# Patient Record
Sex: FEMALE | Race: WHITE | Employment: FULL TIME | URBAN - METROPOLITAN AREA
[De-identification: names, ages, dates, MRNs, and addresses within clinical notes are randomized per-mention and may not be internally consistent; named-entity substitution may affect disease eponyms.]

---

## 2020-01-24 ENCOUNTER — OFFICE VISIT (OUTPATIENT)
Dept: PRIMARY CARE CLINIC | Age: 55
End: 2020-01-24

## 2020-01-24 VITALS
OXYGEN SATURATION: 99 % | HEART RATE: 65 BPM | DIASTOLIC BLOOD PRESSURE: 63 MMHG | TEMPERATURE: 97 F | HEIGHT: 70 IN | RESPIRATION RATE: 16 BRPM | BODY MASS INDEX: 19.33 KG/M2 | SYSTOLIC BLOOD PRESSURE: 130 MMHG | WEIGHT: 135 LBS

## 2020-01-24 PROCEDURE — 99203 OFFICE O/P NEW LOW 30 MIN: CPT | Performed by: EMERGENCY MEDICINE

## 2020-01-24 ASSESSMENT — ENCOUNTER SYMPTOMS
COUGH: 0
WHEEZING: 0
EYE DISCHARGE: 0
ABDOMINAL PAIN: 0
TROUBLE SWALLOWING: 0
RHINORRHEA: 0
DIARRHEA: 0
SHORTNESS OF BREATH: 0
VOMITING: 0
SORE THROAT: 1
EYE PAIN: 0
NAUSEA: 0
SINUS PAIN: 0
SINUS PRESSURE: 1
EYE REDNESS: 1

## 2020-01-24 NOTE — PROGRESS NOTES
Normocephalic and atraumatic. Right Ear: Ear canal and external ear normal. A middle ear effusion is present. Tympanic membrane is erythematous, retracted and bulging. Left Ear: Ear canal and external ear normal. A middle ear effusion is present. Tympanic membrane is erythematous, retracted and bulging. Nose: Mucosal edema present. Right Sinus: No maxillary sinus tenderness or frontal sinus tenderness. Left Sinus: No maxillary sinus tenderness or frontal sinus tenderness. Mouth/Throat:      Pharynx: Uvula midline. No posterior oropharyngeal erythema. Tonsils: No tonsillar exudate or tonsillar abscesses. Eyes:      General: No scleral icterus. Conjunctiva/sclera: Conjunctivae normal.      Pupils: Pupils are equal, round, and reactive to light. Neck:      Musculoskeletal: Normal range of motion and neck supple. No neck rigidity. Thyroid: No thyromegaly. Trachea: No tracheal deviation. Cardiovascular:      Rate and Rhythm: Normal rate and regular rhythm. Heart sounds: Normal heart sounds. No murmur. No friction rub. No gallop. Pulmonary:      Effort: Pulmonary effort is normal. No accessory muscle usage or respiratory distress. Breath sounds: Normal breath sounds. No decreased breath sounds, wheezing, rhonchi or rales. Chest:      Chest wall: No tenderness. Lymphadenopathy:      Cervical: No cervical adenopathy. Skin:     General: Skin is warm and dry. Findings: Erythema (maculopapular rash posterior aspect of both elbows, L>R ) present. Neurological:      General: No focal deficit present. Mental Status: She is alert and oriented to person, place, and time. Psychiatric:         Mood and Affect: Mood normal.         Behavior: Behavior normal.             Assessment:    1.  Acute URI        We discussed the appropriate indications for use of antibiotics and the Patient has expressed understanding that antibiotics are not believed to before you increase the amount of fluids you drink. · Take an over-the-counter pain medicine, such as acetaminophen (Tylenol), ibuprofen (Advil, Motrin), or naproxen (Aleve). Read and follow all instructions on the label. · Before you use cough and cold medicines, check the label. These medicines may not be safe for young children or for people with certain health problems. · Be careful when taking over-the-counter cold or flu medicines and Tylenol at the same time. Many of these medicines have acetaminophen, which is Tylenol. Read the labels to make sure that you are not taking more than the recommended dose. Too much acetaminophen (Tylenol) can be harmful. · Get plenty of rest.  · Do not smoke or allow others to smoke around you. If you need help quitting, talk to your doctor about stop-smoking programs and medicines. These can increase your chances of quitting for good. When should you call for help? Call 911 anytime you think you may need emergency care. For example, call if:    · You have severe trouble breathing.    Call your doctor now or seek immediate medical care if:    · You seem to be getting much sicker.     · You have new or worse trouble breathing.     · You have a new or higher fever.     · You have a new rash.    Watch closely for changes in your health, and be sure to contact your doctor if:    · You have a new symptom, such as a sore throat, an earache, or sinus pain.     · You cough more deeply or more often, especially if you notice more mucus or a change in the color of your mucus.     · You do not get better as expected. Where can you learn more? Go to [email or web address]://uBanksusan.[email or web address] org and sign in to your [email or web address] account. Enter Q687 in the Unocoin box to learn more about \"Upper Respiratory Infection (Cold): Care Instructions. \"     If you do not have an account, please click on the \"Sign Up Now\" link.   Current as of: June 9, 2019  Content Version: 12.3  ©

## 2020-01-24 NOTE — PATIENT INSTRUCTIONS
Mucinex-D OR Mucinex + Afrin nasal spray    Afrin nasal spray twice daily for nasal congestion for no more than 4 consecutive days    Throat sprays, lozenges, salt-water gargles for comfort  Tylenol or Ibuprofen for pain/fever (may alternate up to every 3 hours as needed)  Plenty of fluids    Follow-up with your primary care physician in 1 week if not improving. If you do not have a primary care physician, call [phone] for a referral or visit [email or web address]        Patient Education        Upper Respiratory Infection (Cold): Care Instructions  Your Care Instructions    An upper respiratory infection, or URI, is an infection of the nose, sinuses, or throat. URIs are spread by coughs, sneezes, and direct contact. The common cold is the most frequent kind of URI. The flu and sinus infections are other kinds of URIs. Almost all URIs are caused by viruses. Antibiotics won't cure them. But you can treat most infections with home care. This may include drinking lots of fluids and taking over-the-counter pain medicine. You will probably feel better in 4 to 10 days. The doctor has checked you carefully, but problems can develop later. If you notice any problems or new symptoms, get medical treatment right away. Follow-up care is a key part of your treatment and safety. Be sure to make and go to all appointments, and call your doctor if you are having problems. It's also a good idea to know your test results and keep a list of the medicines you take. How can you care for yourself at home? · To prevent dehydration, drink plenty of fluids, enough so that your urine is light yellow or clear like water. Choose water and other caffeine-free clear liquids until you feel better. If you have kidney, heart, or liver disease and have to limit fluids, talk with your doctor before you increase the amount of fluids you drink.   · Take an over-the-counter pain medicine, such as acetaminophen (Tylenol), ibuprofen (Advil, Motrin), or naproxen (Aleve). Read and follow all instructions on the label. · Before you use cough and cold medicines, check the label. These medicines may not be safe for young children or for people with certain health problems. · Be careful when taking over-the-counter cold or flu medicines and Tylenol at the same time. Many of these medicines have acetaminophen, which is Tylenol. Read the labels to make sure that you are not taking more than the recommended dose. Too much acetaminophen (Tylenol) can be harmful. · Get plenty of rest.  · Do not smoke or allow others to smoke around you. If you need help quitting, talk to your doctor about stop-smoking programs and medicines. These can increase your chances of quitting for good. When should you call for help? Call 911 anytime you think you may need emergency care. For example, call if:    · You have severe trouble breathing.    Call your doctor now or seek immediate medical care if:    · You seem to be getting much sicker.     · You have new or worse trouble breathing.     · You have a new or higher fever.     · You have a new rash.    Watch closely for changes in your health, and be sure to contact your doctor if:    · You have a new symptom, such as a sore throat, an earache, or sinus pain.     · You cough more deeply or more often, especially if you notice more mucus or a change in the color of your mucus.     · You do not get better as expected. Where can you learn more? Go to [email or web address] and sign in to your [employer] account. Enter B389 in the InVisM box to learn more about \"Upper Respiratory Infection (Cold): Care Instructions. \"     If you do not have an account, please click on the \"Sign Up Now\" link. Current as of: June 9, 2019  Content Version: 12.3  © 1617-2763 [employer]wise[employer]. Care instructions adapted under license by [facility] [place] ([facility]).  If you have questions about a medical condition or

## 2020-06-26 ENCOUNTER — VIRTUAL VISIT (OUTPATIENT)
Dept: FAMILY MEDICINE CLINIC | Age: 55
End: 2020-06-26
Payer: COMMERCIAL

## 2020-06-26 PROCEDURE — 99396 PREV VISIT EST AGE 40-64: CPT | Performed by: FAMILY MEDICINE

## 2020-06-26 RX ORDER — ESCITALOPRAM OXALATE 10 MG/1
10 TABLET ORAL DAILY
Qty: 90 TABLET | Refills: 2 | Status: SHIPPED | OUTPATIENT
Start: 2020-06-26 | End: 2020-09-29 | Stop reason: SDUPTHER

## 2020-06-26 RX ORDER — ESTRADIOL AND NORETHINDRONE ACETATE 1; .5 MG/1; MG/1
TABLET, FILM COATED ORAL
COMMUNITY
Start: 2020-03-21 | End: 2020-06-26 | Stop reason: SDUPTHER

## 2020-06-26 RX ORDER — ESTRADIOL 1.53 MG/1
SPRAY TRANSDERMAL
COMMUNITY
Start: 2019-10-18 | End: 2020-06-26 | Stop reason: CLARIF

## 2020-06-26 RX ORDER — ESTRADIOL AND NORETHINDRONE ACETATE 1; .5 MG/1; MG/1
TABLET, FILM COATED ORAL
Qty: 90 TABLET | Refills: 2 | Status: SHIPPED | OUTPATIENT
Start: 2020-06-26 | End: 2020-09-29 | Stop reason: SDUPTHER

## 2020-06-26 RX ORDER — BUTALBITAL, ACETAMINOPHEN AND CAFFEINE 50; 325; 40 MG/1; MG/1; MG/1
1 TABLET ORAL EVERY 4 HOURS PRN
Qty: 30 TABLET | Refills: 1 | Status: SHIPPED | OUTPATIENT
Start: 2020-06-26 | End: 2020-09-29 | Stop reason: SDUPTHER

## 2020-06-26 RX ORDER — ESTRADIOL 1.53 MG/1
SPRAY TRANSDERMAL
Qty: 1 BOTTLE | Refills: 2 | Status: CANCELLED | OUTPATIENT
Start: 2020-06-26

## 2020-06-26 RX ORDER — IPRATROPIUM BROMIDE 42 UG/1
2 SPRAY, METERED NASAL 2 TIMES DAILY
Qty: 3 BOTTLE | Refills: 3 | Status: SHIPPED | OUTPATIENT
Start: 2020-06-26 | End: 2020-09-29 | Stop reason: SDUPTHER

## 2020-06-26 ASSESSMENT — PATIENT HEALTH QUESTIONNAIRE - PHQ9
SUM OF ALL RESPONSES TO PHQ QUESTIONS 1-9: 0
SUM OF ALL RESPONSES TO PHQ QUESTIONS 1-9: 0
SUM OF ALL RESPONSES TO PHQ9 QUESTIONS 1 & 2: 0
2. FEELING DOWN, DEPRESSED OR HOPELESS: 0
1. LITTLE INTEREST OR PLEASURE IN DOING THINGS: 0

## 2020-08-06 ENCOUNTER — NURSE ONLY (OUTPATIENT)
Dept: PRIMARY CARE CLINIC | Age: 55
End: 2020-08-06
Payer: COMMERCIAL

## 2020-08-06 PROCEDURE — 99211 OFF/OP EST MAY X REQ PHY/QHP: CPT | Performed by: NURSE PRACTITIONER

## 2020-08-10 ENCOUNTER — ANESTHESIA EVENT (OUTPATIENT)
Dept: ENDOSCOPY | Age: 55
End: 2020-08-10
Payer: COMMERCIAL

## 2020-08-11 ENCOUNTER — HOSPITAL ENCOUNTER (OUTPATIENT)
Age: 55
Setting detail: OUTPATIENT SURGERY
Discharge: HOME | End: 2020-08-11
Attending: INTERNAL MEDICINE | Admitting: INTERNAL MEDICINE
Payer: COMMERCIAL

## 2020-08-11 ENCOUNTER — ANESTHESIA (OUTPATIENT)
Dept: ENDOSCOPY | Age: 55
End: 2020-08-11
Payer: COMMERCIAL

## 2020-08-11 VITALS
BODY MASS INDEX: 18.61 KG/M2 | DIASTOLIC BLOOD PRESSURE: 93 MMHG | OXYGEN SATURATION: 100 % | RESPIRATION RATE: 16 BRPM | SYSTOLIC BLOOD PRESSURE: 133 MMHG | HEART RATE: 56 BPM | HEIGHT: 70 IN | WEIGHT: 130 LBS | TEMPERATURE: 96.9 F

## 2020-08-11 VITALS
RESPIRATION RATE: 12 BRPM | OXYGEN SATURATION: 99 % | DIASTOLIC BLOOD PRESSURE: 74 MMHG | SYSTOLIC BLOOD PRESSURE: 111 MMHG

## 2020-08-11 PROCEDURE — 2580000003 HC RX 258: Performed by: ANESTHESIOLOGY

## 2020-08-11 PROCEDURE — 2709999900 HC NON-CHARGEABLE SUPPLY: Performed by: INTERNAL MEDICINE

## 2020-08-11 PROCEDURE — 7100000010 HC PHASE II RECOVERY - FIRST 15 MIN: Performed by: INTERNAL MEDICINE

## 2020-08-11 PROCEDURE — 6360000002 HC RX W HCPCS: Performed by: NURSE ANESTHETIST, CERTIFIED REGISTERED

## 2020-08-11 PROCEDURE — 3609010600 HC COLONOSCOPY POLYPECTOMY SNARE/COLD BIOPSY: Performed by: INTERNAL MEDICINE

## 2020-08-11 PROCEDURE — 7100000011 HC PHASE II RECOVERY - ADDTL 15 MIN: Performed by: INTERNAL MEDICINE

## 2020-08-11 PROCEDURE — 3700000000 HC ANESTHESIA ATTENDED CARE: Performed by: INTERNAL MEDICINE

## 2020-08-11 PROCEDURE — 3700000001 HC ADD 15 MINUTES (ANESTHESIA): Performed by: INTERNAL MEDICINE

## 2020-08-11 PROCEDURE — 88305 TISSUE EXAM BY PATHOLOGIST: CPT

## 2020-08-11 RX ORDER — HYDRALAZINE HYDROCHLORIDE 20 MG/ML
5 INJECTION INTRAMUSCULAR; INTRAVENOUS EVERY 10 MIN PRN
Status: DISCONTINUED | OUTPATIENT
Start: 2020-08-11 | End: 2020-08-11 | Stop reason: HOSPADM

## 2020-08-11 RX ORDER — DIPHENHYDRAMINE HYDROCHLORIDE 50 MG/ML
12.5 INJECTION INTRAMUSCULAR; INTRAVENOUS
Status: DISCONTINUED | OUTPATIENT
Start: 2020-08-11 | End: 2020-08-11 | Stop reason: HOSPADM

## 2020-08-11 RX ORDER — SODIUM CHLORIDE, SODIUM LACTATE, POTASSIUM CHLORIDE, CALCIUM CHLORIDE 600; 310; 30; 20 MG/100ML; MG/100ML; MG/100ML; MG/100ML
INJECTION, SOLUTION INTRAVENOUS CONTINUOUS
Status: DISCONTINUED | OUTPATIENT
Start: 2020-08-11 | End: 2020-08-11 | Stop reason: HOSPADM

## 2020-08-11 RX ORDER — SODIUM CHLORIDE 9 MG/ML
INJECTION, SOLUTION INTRAVENOUS CONTINUOUS
Status: DISCONTINUED | OUTPATIENT
Start: 2020-08-11 | End: 2020-08-11 | Stop reason: HOSPADM

## 2020-08-11 RX ORDER — METOCLOPRAMIDE HYDROCHLORIDE 5 MG/ML
10 INJECTION INTRAMUSCULAR; INTRAVENOUS
Status: DISCONTINUED | OUTPATIENT
Start: 2020-08-11 | End: 2020-08-11 | Stop reason: HOSPADM

## 2020-08-11 RX ORDER — PROMETHAZINE HYDROCHLORIDE 25 MG/ML
6.25 INJECTION, SOLUTION INTRAMUSCULAR; INTRAVENOUS
Status: DISCONTINUED | OUTPATIENT
Start: 2020-08-11 | End: 2020-08-11 | Stop reason: HOSPADM

## 2020-08-11 RX ORDER — LIDOCAINE HYDROCHLORIDE 20 MG/ML
INJECTION, SOLUTION INTRAVENOUS PRN
Status: DISCONTINUED | OUTPATIENT
Start: 2020-08-11 | End: 2020-08-11 | Stop reason: SDUPTHER

## 2020-08-11 RX ORDER — LABETALOL 20 MG/4 ML (5 MG/ML) INTRAVENOUS SYRINGE
5 EVERY 10 MIN PRN
Status: DISCONTINUED | OUTPATIENT
Start: 2020-08-11 | End: 2020-08-11 | Stop reason: HOSPADM

## 2020-08-11 RX ORDER — OXYCODONE HYDROCHLORIDE 5 MG/1
5 TABLET ORAL PRN
Status: DISCONTINUED | OUTPATIENT
Start: 2020-08-11 | End: 2020-08-11 | Stop reason: HOSPADM

## 2020-08-11 RX ORDER — OXYCODONE HYDROCHLORIDE 5 MG/1
10 TABLET ORAL PRN
Status: DISCONTINUED | OUTPATIENT
Start: 2020-08-11 | End: 2020-08-11 | Stop reason: HOSPADM

## 2020-08-11 RX ORDER — PROPOFOL 10 MG/ML
INJECTION, EMULSION INTRAVENOUS PRN
Status: DISCONTINUED | OUTPATIENT
Start: 2020-08-11 | End: 2020-08-11 | Stop reason: SDUPTHER

## 2020-08-11 RX ORDER — MORPHINE SULFATE 4 MG/ML
1 INJECTION, SOLUTION INTRAMUSCULAR; INTRAVENOUS EVERY 5 MIN PRN
Status: DISCONTINUED | OUTPATIENT
Start: 2020-08-11 | End: 2020-08-11 | Stop reason: HOSPADM

## 2020-08-11 RX ORDER — SODIUM CHLORIDE 0.9 % (FLUSH) 0.9 %
10 SYRINGE (ML) INJECTION PRN
Status: DISCONTINUED | OUTPATIENT
Start: 2020-08-11 | End: 2020-08-11 | Stop reason: HOSPADM

## 2020-08-11 RX ORDER — MEPERIDINE HYDROCHLORIDE 25 MG/ML
12.5 INJECTION INTRAMUSCULAR; INTRAVENOUS; SUBCUTANEOUS EVERY 5 MIN PRN
Status: DISCONTINUED | OUTPATIENT
Start: 2020-08-11 | End: 2020-08-11 | Stop reason: HOSPADM

## 2020-08-11 RX ORDER — SODIUM CHLORIDE 0.9 % (FLUSH) 0.9 %
10 SYRINGE (ML) INJECTION EVERY 12 HOURS SCHEDULED
Status: DISCONTINUED | OUTPATIENT
Start: 2020-08-11 | End: 2020-08-11 | Stop reason: HOSPADM

## 2020-08-11 RX ADMIN — PROPOFOL 25 MG: 10 INJECTION, EMULSION INTRAVENOUS at 11:07

## 2020-08-11 RX ADMIN — SODIUM CHLORIDE, POTASSIUM CHLORIDE, SODIUM LACTATE AND CALCIUM CHLORIDE: 600; 310; 30; 20 INJECTION, SOLUTION INTRAVENOUS at 10:31

## 2020-08-11 RX ADMIN — PROPOFOL 125 MG: 10 INJECTION, EMULSION INTRAVENOUS at 11:00

## 2020-08-11 RX ADMIN — PROPOFOL 25 MG: 10 INJECTION, EMULSION INTRAVENOUS at 11:15

## 2020-08-11 RX ADMIN — PROPOFOL 35 MG: 10 INJECTION, EMULSION INTRAVENOUS at 11:05

## 2020-08-11 RX ADMIN — PROPOFOL 40 MG: 10 INJECTION, EMULSION INTRAVENOUS at 11:02

## 2020-08-11 RX ADMIN — PROPOFOL 25 MG: 10 INJECTION, EMULSION INTRAVENOUS at 11:19

## 2020-08-11 RX ADMIN — LIDOCAINE HYDROCHLORIDE 15 MG: 20 INJECTION, SOLUTION INTRAVENOUS at 11:09

## 2020-08-11 RX ADMIN — LIDOCAINE HYDROCHLORIDE 50 MG: 20 INJECTION, SOLUTION INTRAVENOUS at 11:00

## 2020-08-11 RX ADMIN — PROPOFOL 30 MG: 10 INJECTION, EMULSION INTRAVENOUS at 11:12

## 2020-08-11 RX ADMIN — PROPOFOL 25 MG: 10 INJECTION, EMULSION INTRAVENOUS at 11:09

## 2020-08-11 RX ADMIN — PROPOFOL 25 MG: 10 INJECTION, EMULSION INTRAVENOUS at 11:21

## 2020-08-11 RX ADMIN — LIDOCAINE HYDROCHLORIDE 15 MG: 20 INJECTION, SOLUTION INTRAVENOUS at 11:05

## 2020-08-11 RX ADMIN — LIDOCAINE HYDROCHLORIDE 20 MG: 20 INJECTION, SOLUTION INTRAVENOUS at 11:02

## 2020-08-11 ASSESSMENT — PULMONARY FUNCTION TESTS
PIF_VALUE: 0

## 2020-08-11 ASSESSMENT — PAIN SCALES - GENERAL
PAINLEVEL_OUTOF10: 0

## 2020-08-11 ASSESSMENT — PAIN - FUNCTIONAL ASSESSMENT: PAIN_FUNCTIONAL_ASSESSMENT: 0-10

## 2020-08-11 NOTE — ANESTHESIA POSTPROCEDURE EVALUATION
Department of Anesthesiology  Postprocedure Note    Patient: Shanel Erickson  MRN: [id number]  YOB: 1965  Date of evaluation: 8/11/2020  Time:  11:47 AM     Procedure Summary     Date:  08/11/20 Room / Location:  [facility]    Anesthesia Start:  1054 Anesthesia Stop:  1134    Procedure:  COLONOSCOPY POLYPECTOMY SNARE/COLD BIOPSY (N/A ) Diagnosis:       Hemorrhoids without complication      (Hemorrhoids without complication [F09.5])    Surgeon:  Gilberto Barrera MD Responsible Provider:  Davon Denney MD    Anesthesia Type:  general ASA Status:  2          Anesthesia Type: general    Mary Ellen Phase I: Mary Ellen Score: 10    Mary Ellen Phase II: Mary Ellen Score: 9    Last vitals: Reviewed and per EMR flowsheets.        Anesthesia Post Evaluation    Patient location during evaluation: PACU  Patient participation: complete - patient participated  Level of consciousness: awake and alert  Pain score: 0  Airway patency: patent  Nausea & Vomiting: no nausea and no vomiting  Complications: no  Cardiovascular status: hemodynamically stable  Respiratory status: acceptable  Hydration status: euvolemic

## 2020-08-11 NOTE — PROGRESS NOTES
Ambulatory Surgery/Procedure Discharge Note    Vitals:    08/11/20 1158   BP: (!) 133/93   Pulse: 56   Resp: 16   Temp:    SpO2: 100%       In: 600 [I.V.:600]  Out: -     Restroom use offered before discharge. Yes, pt declined     Pain assessment:  level of pain (1-10, 10 severe)  Pain Level: 0    1140: Pt to Endoscopy recovery post colonoscopy. Pt denies pain at this time. Pt denies nausea at this time. Abdomen soft. Pt resting comfortably. Dr. Lyn has already spoken to pt prior to arrival to Phase II.   1150: Pt tolerating PO fluids well. 1200: Pt denies pain, pt denies nausea at this time. Awaiting . 1240: Pt's  has arrived, discharge instructions given and  states understanding of these instructions. Patient discharged to home/self care.  Patient discharged via wheel chair by transporter to waiting family/S.O.       8/11/2020 12:08 PM

## 2020-08-11 NOTE — H&P
[facility] [place]   Pre-operative History and Physical    Patient: John Ramsey  : 1965     History Obtained From:  patient, electronic medical record    HISTORY OF PRESENT ILLNESS:    The patient is a 63 y.o. female who presents for a colonoscopy for GI bleed/screening. Past Medical History:        Diagnosis Date    Anxiety     Basal cell carcinoma     chest , stomach    DDD (degenerative disc disease), cervical     L4-5; L5-S1    History of melanoma     right thigh     Lumbar foraminal stenosis     Migraine      Past Surgical History:        Procedure Laterality Date    BACK SURGERY  2000    X 4    ENDOMETRIAL ABLATION  2013    FOOT SURGERY Left      Medications Prior to Admission:   No current facility-administered medications on file prior to encounter. Current Outpatient Medications on File Prior to Encounter   Medication Sig Dispense Refill    MIMVEY 1-0.5 MG per tablet TAKE 1 TABLET BY MOUTH EVERY DAY 90 tablet 2    escitalopram (LEXAPRO) 10 MG tablet Take 1 tablet by mouth daily 90 tablet 2    butalbital-acetaminophen-caffeine (FIORICET) -40 MG per tablet Take 1 tablet by mouth every 4 hours as needed for Headaches 30 tablet 1    ipratropium (ATROVENT) 0.06 % nasal spray 2 sprays by Nasal route 2 times daily 3 Bottle 3     Allergies:  Iodine    History of allergic reaction to anesthesia:  No    Social History:   TOBACCO:   reports that she has never smoked. She has never used smokeless tobacco.  ETOH:   reports current alcohol use. DRUGS:   reports no history of drug use.   Family History:       Problem Relation Age of Onset    Arthritis Mother         back    Other Mother         balance    Diabetes type 2  Father 72    Coronary Art Dis Father 72    Hypertension Father     Arthritis Father     Colon Cancer Maternal Grandmother     Cancer Brother         melanoma    Breast Cancer Maternal Aunt     Breast Cancer Maternal Cousin        PHYSICAL EXAM:      BP (!) 140/93   Pulse 66   Temp 97.5 °F (36.4 °C) (Temporal)   Resp 16   Ht 5' 10\" (1.778 m)   Wt 130 lb (59 kg)   SpO2 100%   BMI 18.65 kg/m²  I        Heart:  No m/r/g +s1/s2 RRR    Lungs:  CTA bilaterally    Abdomen:  Soft, nontender, non distended; +bs    ASA Grade:  ASA 2 - Patient with mild systemic disease with no functional limitations    Mallampati Class:  Class I: Soft palate, uvula, fauces, pillars visible  __________  Class II: Soft palate, uvula, fauces visible  ____x______   Class III: Soft palate, base of uvula visible  __________  Class IV: Hard palate only visible   __________      ASSESSMENT AND PLAN:    1. Patient is a 63 y.o. female here for colonoscopy with deep sedation  2. Procedure options, risks and benefits reviewed with patient. We specifically discussed that risks include, but are not limited to infection, bleeding, perforation, death, and missed lesions. Patient expresses understanding.

## 2020-08-11 NOTE — ANESTHESIA PRE PROCEDURE
Department of Anesthesiology  Preprocedure Note       Name:  Brie Chaudhry   Age:  63 y.o.  :  1965                                          MRN:  [id number]         Date:  2020      Surgeon: Zehra Finley):  Osiel Munguia MD    Procedure: Procedure(s):  COLONOSCOPY    Medications prior to admission:   Prior to Admission medications    Medication Sig Start Date End Date Taking? Authorizing Provider   MIMVEY 1-0.5 MG per tablet TAKE 1 TABLET BY MOUTH EVERY DAY 20  Yes Jose Quinn MD   escitalopram (LEXAPRO) 10 MG tablet Take 1 tablet by mouth daily 20  Yes Jose Quinn MD   butalbital-acetaminophen-caffeine (FIORICET) -54 MG per tablet Take 1 tablet by mouth every 4 hours as needed for Headaches 20  Yes Jose Quinn MD   ipratropium (ATROVENT) 0.06 % nasal spray 2 sprays by Nasal route 2 times daily 20 Yes Jose Quinn MD       Current medications:    Current Facility-Administered Medications   Medication Dose Route Frequency Provider Last Rate Last Dose    sodium chloride flush 0.9 % injection 10 mL  10 mL Intravenous 2 times per day Haroldine Feller, DO        sodium chloride flush 0.9 % injection 10 mL  10 mL Intravenous PRN Haroldine Feller, DO        0.9 % sodium chloride infusion   Intravenous Continuous Haroldine Feller, DO        lactated ringers infusion   Intravenous Continuous Haroldine Feller,  mL/hr at 20 1031         Allergies:     Allergies   Allergen Reactions    Iodine        Problem List:    Patient Active Problem List   Diagnosis Code    Anxiety F41.9    Lumbar foraminal stenosis M48.061    DDD (degenerative disc disease), cervical M50.30    History of melanoma Z85.820    Basal cell carcinoma C44.91       Past Medical History:        Diagnosis Date    Anxiety     Basal cell carcinoma     chest , stomach    DDD (degenerative disc disease), cervical     L4-5; L5-S1    History of melanoma     right thigh     Lumbar foraminal stenosis     Migraine        Past Surgical History:        Procedure Laterality Date    BACK SURGERY  2000    X 4    ENDOMETRIAL ABLATION  2013    FOOT SURGERY Left        Social History:    Social History     Tobacco Use    Smoking status: Never Smoker    Smokeless tobacco: Never Used   Substance Use Topics    Alcohol use: Yes     Comment: socially                                Counseling given: Not Answered      Vital Signs (Current):   Vitals:    08/11/20 0953   BP: (!) 140/93   Pulse: 66   Resp: 16   Temp: 97.5 °F (36.4 °C)   TempSrc: Temporal   SpO2: 100%   Weight: 130 lb (59 kg)   Height: 5' 10\" (1.778 m)                                              BP Readings from Last 3 Encounters:   08/11/20 (!) 140/93   01/24/20 130/63       NPO Status: Time of last liquid consumption: 0600                        Time of last solid consumption: 1900                        Date of last liquid consumption: 08/11/20                        Date of last solid food consumption: 08/09/20    BMI:   Wt Readings from Last 3 Encounters:   08/11/20 130 lb (59 kg)   01/24/20 135 lb (61.2 kg)     Body mass index is 18.65 kg/m². CBC: No results found for: WBC, RBC, HGB, HCT, MCV, RDW, PLT    CMP: No results found for: NA, K, CL, CO2, BUN, CREATININE, GFRAA, AGRATIO, LABGLOM, GLUCOSE, PROT, CALCIUM, BILITOT, ALKPHOS, AST, ALT    POC Tests: No results for input(s): POCGLU, POCNA, POCK, POCCL, POCBUN, POCHEMO, POCHCT in the last 72 hours.     Coags: No results found for: PROTIME, INR, APTT    HCG (If Applicable): No results found for: PREGTESTUR, PREGSERUM, HCG, HCGQUANT     ABGs: No results found for: PHART, PO2ART, HDZ9NNE, FHO8EFQ, BEART, Q5QVPWAJ     Type & Screen (If Applicable):  No results found for: LABABO, LABRH    Drug/Infectious Status (If Applicable):  No results found for: HIV, HEPCAB    COVID-19 Screening (If Applicable):   Lab Results   Component Value Date    COVID19 Not Detected 08/06/2020         Anesthesia

## 2020-08-11 NOTE — PROCEDURES
[facility]  Colonoscopy REPORT    Patient: Rico Stern                  1965    Referring Physician:  Jennifer Spring: Barbara Berrios     Indication:  GI bleed; screening     Medications:  MAC      Pre-Anesthesia Assessment:  I have reviewed and am in agreement with patient history and medication, including previous response to sedation. Prior to the procedure, a History and Physical was performed, and patient medications and allergies were reviewed. The risks and benefits of the procedure and the sedation options and risks were discussed with the patient. Complications included but were not limited to infection, bleeding, perforation, death, and missed lesions. All questions were answered and informed consent was obtained by the patient. Patient identification and proposed procedure were verified by the physician and the nurse in the pre-procedure area and in the procedure room. Mallampatti: II  ASA Grade Assessment: 2    After reviewing the risks and benefits, the patient was deemed in satisfactory condition to undergo the procedure. The anesthesia plan was to use MAC sedation. Immediately prior to administration of medications, the patient was re-assessed for adequacy to receive sedatives and a time out was performed. Patient and healthcare providers were in agreement it was the correct patient and procedure. The heart rate, respiratory rate, oxygen saturations, blood pressure, adequacy of pulmonary ventilation, and response to care were monitored throughout the procedure. The physical status of the patient was re-assessed after the procedure. After adequate sedation was achieved in stepwise fashion a rectal examination was performed and showed hemorrhoids. The pediatric colonoscope was then advanced atraumatically into the rectum and advanced without difficulty to the cecum.   The cecum was identified by the appendiceal orifice the ileocecal valve and other normal anatomy and a picture was obtained. The scope was then withdrawn (>6minutes) with close inspection of the mucosa in a circumferential manor. TI normal for 5cms. Retroflexed views under the ICV and of the right colon normal.     5mm cecal polyp removed with cold snare. 6mm transverse polyp removed with cold snare. Retroflexed views of the rectum show RP hemorrhoid is gone with healed scar. LL > RA. Skin tag present as well. No immediate complications. The preparation was good. Estimated blood loss none    Impression:  Normal TI  2 polyps  Hemorrhoids. Plan:  The patient is aware it is their responsibility to call for biopsy results in 7 days. Repeat colonoscopy in 5 years.

## 2020-08-12 LAB — SARS-COV-2, NAA: NOT DETECTED

## 2020-08-17 ENCOUNTER — OFFICE VISIT (OUTPATIENT)
Dept: GYNECOLOGY | Age: 55
End: 2020-08-17
Payer: COMMERCIAL

## 2020-08-17 VITALS
WEIGHT: 133.8 LBS | TEMPERATURE: 96 F | SYSTOLIC BLOOD PRESSURE: 126 MMHG | RESPIRATION RATE: 17 BRPM | DIASTOLIC BLOOD PRESSURE: 87 MMHG | BODY MASS INDEX: 19.15 KG/M2 | HEART RATE: 73 BPM | HEIGHT: 70 IN

## 2020-08-17 PROCEDURE — 99386 PREV VISIT NEW AGE 40-64: CPT | Performed by: OBSTETRICS & GYNECOLOGY

## 2020-08-17 NOTE — PROGRESS NOTES
activity: Not Currently   Lifestyle    Physical activity     Days per week: Not on file     Minutes per session: Not on file    Stress: Not on file   Relationships    Social connections     Talks on phone: Not on file     Gets together: Not on file     Attends Restoration service: Not on file     Active member of club or organization: Not on file     Attends meetings of clubs or organizations: Not on file     Relationship status: Not on file    Intimate partner violence     Fear of current or ex partner: Not on file     Emotionally abused: Not on file     Physically abused: Not on file     Forced sexual activity: Not on file   Other Topics Concern    Not on file   Social History Narrative    Not on file     Allergies   Allergen Reactions    Iodine Hives     Outpatient Medications Marked as Taking for the 8/17/20 encounter (Office Visit) with Justice Avina MD   Medication Sig Dispense Refill    MIMVEY 1-0.5 MG per tablet TAKE 1 TABLET BY MOUTH EVERY DAY 90 tablet 2    escitalopram (LEXAPRO) 10 MG tablet Take 1 tablet by mouth daily 90 tablet 2    butalbital-acetaminophen-caffeine (FIORICET) -40 MG per tablet Take 1 tablet by mouth every 4 hours as needed for Headaches 30 tablet 1    ipratropium (ATROVENT) 0.06 % nasal spray 2 sprays by Nasal route 2 times daily 3 Bottle 3     Family History   Problem Relation Age of Onset    Arthritis Mother         back    Other Mother         balance    Diabetes type 2  Father 72    Coronary Art Dis Father 72    Hypertension Father     Arthritis Father     Colon Cancer Maternal Grandmother     Cancer Brother         melanoma    Breast Cancer Maternal Aunt     Breast Cancer Maternal Cousin      /87 (Site: Right Upper Arm, Position: Sitting, Cuff Size: Medium Adult)   Pulse 73   Temp 96 °F (35.6 °C)   Resp 17   Ht 5' 10\" (1.778 m)   Wt 133 lb 12.8 oz (60.7 kg)   BMI 19.20 kg/m²       Objective:   Physical Exam  Constitutional: Appearance: Normal appearance. She is well-developed and normal weight. HENT:      Head: Normocephalic. Nose: Nose normal.      Mouth/Throat:      Mouth: Mucous membranes are moist.      Pharynx: Oropharynx is clear. Eyes:      Pupils: Pupils are equal, round, and reactive to light. Neck:      Musculoskeletal: Normal range of motion and neck supple. No neck rigidity. Thyroid: No thyromegaly. Cardiovascular:      Rate and Rhythm: Normal rate and regular rhythm. Pulses: Normal pulses. Heart sounds: Normal heart sounds. No murmur. No friction rub. No gallop. Pulmonary:      Effort: Pulmonary effort is normal. No respiratory distress. Breath sounds: Normal breath sounds. No stridor. No wheezing, rhonchi or rales. Chest:      Chest wall: No tenderness. Breasts:         Right: Normal. No swelling, bleeding, inverted nipple, mass, nipple discharge, skin change or tenderness. Left: Normal. No swelling, bleeding, inverted nipple, mass, nipple discharge, skin change or tenderness. Abdominal:      General: Bowel sounds are normal. There is no distension. Palpations: Abdomen is soft. There is no mass. Tenderness: There is no abdominal tenderness. There is no guarding or rebound. Hernia: No hernia is present. There is no hernia in the left inguinal area. Genitourinary:     General: Normal vulva. Exam position: Lithotomy position. Pubic Area: No rash. Labia:         Right: No rash, tenderness, lesion or injury. Left: No rash, tenderness, lesion or injury. Urethra: No prolapse, urethral pain, urethral swelling or urethral lesion. Vagina: No signs of injury and foreign body. No vaginal discharge, erythema, tenderness, bleeding, lesions or prolapsed vaginal walls. Cervix: No cervical motion tenderness, discharge, friability, lesion, erythema, cervical bleeding or eversion.       Uterus: Not deviated, not enlarged, not fixed and not tender. Adnexa:         Right: No mass, tenderness or fullness. Left: No mass, tenderness or fullness. Rectum: External hemorrhoid present. No anal fissure. Comments: Internal rectal exam deferred due to hemorrhoids  Musculoskeletal: Normal range of motion. General: No tenderness. Lymphadenopathy:      Cervical: No cervical adenopathy. Lower Body: No right inguinal adenopathy. No left inguinal adenopathy. Skin:     General: Skin is warm and dry. Coloration: Skin is not pale. Findings: No erythema or rash. Neurological:      General: No focal deficit present. Mental Status: She is alert and oriented to person, place, and time. Mental status is at baseline. Deep Tendon Reflexes: Reflexes are normal and symmetric. Psychiatric:         Mood and Affect: Mood normal.         Behavior: Behavior normal.         Thought Content: Thought content normal.         Judgment: Judgment normal.         Assessment:      1. Annual  2. Menopause  3. Hemorrhoids  4. Hx melanoma      Plan:      1. Pap, calcium, exercise, mammogram, hemocult negative  2. Stable  3. Getting treatment for these-discussed Dr. Minal Horton if needs surgery  4. Referral to Derm.         aKrishma Ziegler MD

## 2020-08-18 ASSESSMENT — ENCOUNTER SYMPTOMS
RESPIRATORY NEGATIVE: 1
EYES NEGATIVE: 1
GASTROINTESTINAL NEGATIVE: 1
BACK PAIN: 1

## 2020-08-19 LAB
HPV COMMENT: NORMAL
HPV TYPE 16: NOT DETECTED
HPV TYPE 18: NOT DETECTED
HPVOH (OTHER TYPES): NOT DETECTED

## 2020-09-09 ENCOUNTER — OFFICE VISIT (OUTPATIENT)
Dept: SURGERY | Age: 55
End: 2020-09-09
Payer: COMMERCIAL

## 2020-09-09 VITALS
RESPIRATION RATE: 16 BRPM | DIASTOLIC BLOOD PRESSURE: 90 MMHG | BODY MASS INDEX: 19.61 KG/M2 | WEIGHT: 137 LBS | HEIGHT: 70 IN | HEART RATE: 69 BPM | SYSTOLIC BLOOD PRESSURE: 148 MMHG | TEMPERATURE: 97.1 F

## 2020-09-09 PROCEDURE — 99203 OFFICE O/P NEW LOW 30 MIN: CPT | Performed by: SURGERY

## 2020-09-09 PROCEDURE — 46600 DIAGNOSTIC ANOSCOPY SPX: CPT | Performed by: SURGERY

## 2020-09-09 NOTE — PATIENT INSTRUCTIONS
Hemorrhoids: Information and Care Instructions        Hemorrhoids are enlarged veins that develop in the anal canal. They can occur with constipation, diarrhea, straining and pushing during bowel movements, pregnancy, and smoking/coughing. The tissue can get irritated and inflamed, causing bleeding, itching, swelling, and pain. Hemorrhoids can be internal or external (or occasionally both). Sometimes internal hemorrhoids can prolapse, or come out of the anus, causing difficulty keeping clean, mucus drainage, bleeding, and discomfort. External hemorrhoids are more likely to clot and cause sudden severe pain on the outside of the anus. They can be uncomfortable at times, but hemorrhoids rarely are a life-threatening problem. However, not all bleeding and pain can be blamed on hemorrhoids until a thorough examination (and sometimes a colonoscopy) is performed. The initial treatment for both internal and external hemorrhoids is the same, as below:    STOOL REGIMEN for hemorrhoids:    Stools should be soft, formed, and easy to pass consistency, not diarrhea, without the need to strain. 1) Eat a healthy diet with lots of fruits and vegetables. Exercise and be active. 2) Use a fiber supplement twice daily (Metamucil, Benefiber, Konsyl, Citrucel, generic brand, etc) per package instructions. - Women should aim for 25 grams of fiber daily.    - Men should aim for 30-35 grams of fiber daily. 3) Drink 6-8 glasses of water per day. This will work with the fiber to regulate your stools. 4) MiraLax is safe to use every day, and can be used to help lubricate and soften stool. 5) Colace stool softeners can also be used as needed. Avoid Senna and sennakot laxative products, as they may damage the colon with long-term use. Warm water sitz baths (sit in a tub filled with 3-4 inches of warm water) can be done 1-2 times daily for 5 min to help with hygiene and relaxation.      DO NOT STRAIN ON THE TOILET. DO NOT READ OR PLAY CELL PHONE GAMES ON THE TOILET. Stool regimen should be trialed for 6-8 weeks before considering additional procedures or surgery. What procedures are available for internal hemorrhoids that do not respond to the above stool regimen:    · Rubber Band Ligation: This procedure is performed in the office without anesthetic. A small scope is placed into the anus and small rubber bands are placed over the hemorrhoid tissue. This causes excess tissue to fall off and scar into the rectum. This is done for patients with internal hemorrhoids only. Often this procedure needs to be repeated. Complication rates are very low. There is only minor discomfort and no down time. · Surgical excision (Hemorrhoidectomy): This surgery is performed in the operating room with sedation. The hemorrhoid tissue is cut out and wounds are stitched back together. It has low complications rates and has a 95% long term success rate. It is a painful procedure, however, and most patients require at least 2 weeks off from work/school. This may be recommended for patients with large hemorrhoids, and those who wish to have internal and external hemorrhoids addressed simultaneously, and those who desire the most definitive procedure. · Colonoscopy: This is an adjunct procedure in patients with rectal bleeding. Depending on your age and family history, colonoscopy may be recommended before pursuing hemorrhoid procedures. This is to ensure that the source of bleeding is truly hemorrhoids, and not from polyps, cancer, or inflammation located elsewhere in the colon or rectum. What about over-the-counter ointments, creams, and suppositories? Unfortunately for consumers, there is very little actual scientific data to support the use of any over-the-counter products. These usually aren't harmful to try for a few weeks, and may help with some symptoms, but all in all are a waste of money.  Again, these will just merely mask the symptoms and do not actually address the underlying problem. Some of these (especially steroid-based creams), can actually cause damage to the anus and skin if used over a longer period of time (more than 3 weeks). What about external hemorrhoids? External hemorrhoids can clot or \"thrombose\". This can cause sudden onset of severe pain. Typically the clot will dissolve or push through the skin on its own within 5-7 days. However, if patients are seen within the first 1-3 days of the start of the pain, the clot and external hemorrhoid can be excised (cut) in the office, reducing the duration of pain and reducing healing time. This is typically done with local anesthetic injection. After an external hemorrhoid heals, typically there is a small skin tag that is left behind. No treatment is necessary for skin tags unless there is interference with hygiene. When should you call the office? · You have any questions or concerns. · You have excessive bleeding, fever, chills, or inability to urinate. · The office phone # is [phone]  · If you are unable to reach the office (outside of normal business hours) and you have any concerns, go to your nearest emergency room. Rubber Band Ligation for Hemorrhoids: Before, During, and After Your Procedure    What is rubber band ligation? Rubber band ligation treats hemorrhoids. Hemorrhoids are swollen veins in the rectal area that can commonly cause bleeding, excess tissue (prolapse), discomfort, and difficulty keeping clean. This treatment is only for internal hemorrhoids. To do the procedure, your doctor puts a special viewing tool into your anus. This tool is called an anoscope. The doctor then uses a suction tool to grab the hemorrhoid and put a rubber band around it. The band stops the blood flow.  This causes the hemorrhoids to shrink and fall off in 3 to 10 days, and purposeful scarring of the tissue back into the rectum. This procedure is done in the office. Typically one hemorrhoid is treated at a time during your first visit. More can be treated if a repeat procedure is required. The procedure takes about 15 minutes. You can go home when it's done. Some people are able to return to regular activities right away. Others may need to take a few days off from work. It's very important not to strain when you have a bowel movement before and after your procedure. Continue with your daily fiber supplement (metamucil, benefiber, konsyl, generic brand), healthy fruits and vegetables, plenty of water (4-6 glasses per day), and MiraLax as needed. Stools should be soft and easy to pass, but not diarrhea. Preparing for the procedure  · Understand exactly what procedure is planned, along with the risks, benefits, and other options. · If you take blood thinners, such as warfarin (Coumadin), clopidogrel (Plavix), or aspirin, be sure to talk to your doctor. He or she will tell you if you should stop taking these medicines before your procedure. Make sure that you understand exactly what your doctor wants you to do. · Please perform a fleets enema 1-2 hours before your appointment. This will insure the rectum is cleaned out. This can be provided from our office for free or at most drug stores over-the-counter. At the doctor's office  · Bring a picture ID, insurance information, and any required copay. Please arrive 10 minutes early. · The procedure is performed without sedation, as most patients have only minor discomfort, if any.   · Some patients may experience lightheadedness right after the procedure and need to sit down for 5-10 minutes before leaving the office  · Some patients will feel more comfortable having someone else drive them to the appointment, though this is not required    After the procedure:  · There are no specific wound care instructions other than keeping stools soft as mentioned above  · Warm water soaks (5-10 minutes) can be helpful for any discomfort  · You may feel a \"fullness\" in your rectum and the urge to defecate during the first 24 hours. This is normal.  · You should expect the hemorrhoid tissue to fall off and pass within 3-10 days. This may be accompanied by passing tissue or a small amount of blood. This is normal.  · You will have an office appointment scheduled in 2-4 weeks to assess the need for additional rubber banding or other treatments. Risks and potential complications  · Potential need for additional rubber banding in office (common)  · Potential need for future hemorrhoid surgery (uncommon)  · Clotting and pain from external hemorrhoids (uncommon)  · Rectal bleeding requiring surgery (uncommon)  · Difficulty with urinating (uncommon)  · Damage to sphincter muscle resulting in changes in your ability to control stool or gas (rare)  · Infections requiring emergency surgery and colostomy (very rare)    When should you call the office? · You have any questions or concerns. · You don't understand how to prepare for your procedure. · You experience sharp or severe pain that doesn't subside within 1-2 hours  · You have excessive bleeding, fever, chills, or inability to urinate  · You need to reschedule or have changed your mind about having the procedure. · The office phone # is [phone]  · If you are unable to reach the office (outside of normal business hours) and you have any concerns, go to your nearest emergency room.

## 2020-09-15 ENCOUNTER — OFFICE VISIT (OUTPATIENT)
Dept: SURGERY | Age: 55
End: 2020-09-15
Payer: COMMERCIAL

## 2020-09-15 VITALS
WEIGHT: 138 LBS | HEART RATE: 72 BPM | HEIGHT: 70 IN | TEMPERATURE: 96.9 F | BODY MASS INDEX: 19.76 KG/M2 | SYSTOLIC BLOOD PRESSURE: 124 MMHG | DIASTOLIC BLOOD PRESSURE: 87 MMHG

## 2020-09-15 PROCEDURE — 46221 LIGATION OF HEMORRHOID(S): CPT | Performed by: SURGERY

## 2020-09-15 NOTE — PATIENT INSTRUCTIONS
DISCHARGE INSTRUCTIONS:     Rubber Band Ligation for Hemorrhoids: Before, During, and After Your Procedure    What is rubber band ligation? Rubber band ligation treats hemorrhoids. Hemorrhoids are swollen veins in the rectal area that can commonly cause bleeding, excess tissue (prolapse), discomfort, and difficulty keeping clean. This treatment is only for internal hemorrhoids. To do the procedure, your doctor puts a special viewing tool into your anus. This tool is called an anoscope. The doctor then uses a suction tool to grab the hemorrhoid and put a rubber band around it. The band stops the blood flow. This causes the hemorrhoids to shrink and fall off in 3 to 10 days, and purposeful scarring of the tissue back into the rectum. This procedure is done in the office. Typically one hemorrhoid is treated at a time during your first visit. More can be treated if a repeat procedure is required. The procedure takes about 15 minutes. You can go home when it's done. Some people are able to return to regular activities right away. Others may need to take a few days off from work. It's very important not to strain when you have a bowel movement before and after your procedure. Continue with your daily fiber supplement (metamucil, benefiber, konsyl, generic brand), healthy fruits and vegetables, plenty of water (4-6 glasses per day), and MiraLax as needed. Stools should be soft and easy to pass, but not diarrhea. Preparing for the procedure  · Understand exactly what procedure is planned, along with the risks, benefits, and other options. · If you take blood thinners, such as warfarin (Coumadin), clopidogrel (Plavix), or aspirin, be sure to talk to your doctor. He or she will tell you if you should stop taking these medicines before your procedure. Make sure that you understand exactly what your doctor wants you to do. · Please perform a fleets enema 1-2 hours before your appointment.  This will insure the rectum is cleaned out. This can be provided from our office for free or at most drug stores over-the-counter. At the doctor's office  · Bring a picture ID, insurance information, and any required copay. Please arrive 10 minutes early. · The procedure is performed without sedation, as most patients have only minor discomfort, if any. · Some patients may experience lightheadedness right after the procedure and need to sit down for 5-10 minutes before leaving the office  · Some patients will feel more comfortable having someone else drive them to the appointment, though this is not required    After the procedure:  · There are no specific wound care instructions other than keeping stools soft as mentioned above  · Warm water soaks (5-10 minutes) can be helpful for any discomfort  · You may feel a \"fullness\" in your rectum and the urge to defecate during the first 24 hours. This is normal.  · You should expect the hemorrhoid tissue to fall off and pass within 3-10 days. This may be accompanied by passing tissue or a small amount of blood. This is normal.  · You will have an office appointment scheduled in 3-4 weeks to assess the need for additional rubber banding or other treatments. Risks and potential complications  · Potential need for additional rubber banding in office (common)  · Potential need for future hemorrhoid surgery (uncommon)  · Clotting and pain from external hemorrhoids (uncommon)  · Rectal bleeding requiring surgery (uncommon)  · Difficulty with urinating (uncommon)  · Damage to sphincter muscle resulting in changes in your ability to control stool or gas (rare)  · Infections requiring emergency surgery and colostomy (very rare)    When should you call the office? · You have any questions or concerns. · You don't understand how to prepare for your procedure.   · You experience sharp or severe pain that doesn't subside within 1-2 hours  · You have excessive bleeding, fever, chills, or inability to urinate  · You need to reschedule or have changed your mind about having the procedure. · The office phone # is [phone]  · If you are unable to reach the office (outside of normal business hours) and you have any concerns, go to your nearest emergency room.

## 2020-09-15 NOTE — PROGRESS NOTES
INTERNAL HEMORRHOID RUBBER BAND LIGATION PROCEDURE NOTE:    Patient presented with symptomatic internal hemorrhoids unresponsive to conservative management. See previous office notes for details of previous history and treatments. Risks of rubber band ligation explained to patient, including but not limited to: immediate and delayed bleeding, pain, infection, external hemorrhoids thrombosis, pelvic sepsis, urinary retention, sphincter dysfunction, need for additional procedures, and recurrence. Chaperone/MA present in room during entire procedure. Patient was placed in either lateral or knee-chest positioning depending on patient preference. Exam table manipulated for proper visualization and lighting. Buttocks spread. Digital exam and anoscopy performed confirming internal hemorrhoids. Suction rubber band ligator used to place band in the left ant, right ant, right post positions, 1 cm proximal to the dentate line, at the apex of the internal hemorrhoid. Anoscope removed. Patient tolerated the procedure well without complication. EBL minimal. Post procedure expectations and instructions explained to patient. Written instructions provided as well. Disposition: Follow up in 4 weeks for symptom reassessment.     Electronically signed by Aldo Vidal MD on 9/15/2020 at 12:22 PM

## 2020-09-18 ENCOUNTER — HOSPITAL ENCOUNTER (OUTPATIENT)
Dept: MAMMOGRAPHY | Age: 55
Discharge: HOME | End: 2020-09-18
Payer: COMMERCIAL

## 2020-09-18 PROCEDURE — 77063 BREAST TOMOSYNTHESIS BI: CPT

## 2020-09-22 ENCOUNTER — EMPLOYEE WELLNESS (OUTPATIENT)
Dept: OTHER | Age: 55
End: 2020-09-22

## 2020-09-22 LAB
CHOLESTEROL, TOTAL: 210 MG/DL (ref 0–199)
GLUCOSE BLD-MCNC: 72 MG/DL (ref 70–99)
HDLC SERPL-MCNC: 60 MG/DL (ref 40–60)
LDL CHOLESTEROL CALCULATED: 127 MG/DL
TRIGL SERPL-MCNC: 116 MG/DL (ref 0–150)

## 2020-09-30 RX ORDER — BUTALBITAL, ACETAMINOPHEN AND CAFFEINE 50; 325; 40 MG/1; MG/1; MG/1
1 TABLET ORAL EVERY 4 HOURS PRN
Qty: 30 TABLET | Refills: 1 | Status: SHIPPED | OUTPATIENT
Start: 2020-09-30 | End: 2021-06-02 | Stop reason: SDUPTHER

## 2020-09-30 RX ORDER — ESCITALOPRAM OXALATE 10 MG/1
10 TABLET ORAL DAILY
Qty: 90 TABLET | Refills: 2 | Status: SHIPPED | OUTPATIENT
Start: 2020-09-30 | End: 2021-06-02 | Stop reason: SDUPTHER

## 2020-09-30 RX ORDER — ESTRADIOL AND NORETHINDRONE ACETATE 1; .5 MG/1; MG/1
TABLET, FILM COATED ORAL
Qty: 90 TABLET | Refills: 1 | Status: SHIPPED | OUTPATIENT
Start: 2020-09-30 | End: 2021-03-22

## 2020-09-30 RX ORDER — IPRATROPIUM BROMIDE 42 UG/1
2 SPRAY, METERED NASAL 2 TIMES DAILY
Qty: 3 BOTTLE | Refills: 3 | Status: SHIPPED | OUTPATIENT
Start: 2020-09-30 | End: 2021-06-02 | Stop reason: SDUPTHER

## 2020-10-06 ENCOUNTER — OFFICE VISIT (OUTPATIENT)
Dept: SURGERY | Age: 55
End: 2020-10-06
Payer: COMMERCIAL

## 2020-10-06 VITALS
HEIGHT: 70 IN | HEART RATE: 75 BPM | OXYGEN SATURATION: 99 % | DIASTOLIC BLOOD PRESSURE: 89 MMHG | WEIGHT: 138 LBS | BODY MASS INDEX: 19.76 KG/M2 | TEMPERATURE: 96.6 F | SYSTOLIC BLOOD PRESSURE: 129 MMHG

## 2020-10-06 PROCEDURE — 99213 OFFICE O/P EST LOW 20 MIN: CPT | Performed by: SURGERY

## 2020-10-06 NOTE — PROGRESS NOTES
[address] [address] [address]  Dept: [phone]  Dept Fax: [phone]  Loc: [phone]    Visit Date: 10/6/2020    Zulema Serrato is a 63 y.o. female who presents today for: Other (Internal and external prolapsed hemorrhoids )      HPI:       Kaylin Elam is a 63 y.o. female known to me for internal and external hemorrhoids. She underwent rubber band ligation about 1 month ago. She does not feel like she has had any improvement in her symptoms. She again tells me that she overall feels unclean and that she is limited in her life and activities due to hygiene issues. She denies fever, chills, nausea, vomiting, food intolerance, weight loss, abdominal pain.     Past Medical History:   Diagnosis Date    Anxiety     Basal cell carcinoma     chest , stomach    DDD (degenerative disc disease), cervical     L4-5; L5-S1    History of melanoma 2000    right thigh     Infertility, female     Lumbar foraminal stenosis     Menopause ovarian failure     Menorrhagia     Migraine     Ovarian cyst      Past Surgical History:   Procedure Laterality Date    BACK SURGERY  2000    X 4    COLONOSCOPY N/A 8/11/2020    COLONOSCOPY POLYPECTOMY SNARE/COLD BIOPSY performed by Panchito Whelan MD at [address] [place] [address]  2013    FOOT SURGERY Left     HEMORRHOID SURGERY      internal hemorrhoid banding- Dr. Chris Bray in early 26ths       Current Outpatient Medications:     MIMVEY 1-0.5 MG per tablet, TAKE 1 TABLET BY MOUTH EVERY DAY, Disp: 90 tablet, Rfl: 1    escitalopram (LEXAPRO) 10 MG tablet, Take 1 tablet by mouth daily, Disp: 90 tablet, Rfl: 2    butalbital-acetaminophen-caffeine (FIORICET) -40 MG per tablet, Take 1 tablet by mouth every 4 hours as needed for Headaches, Disp: 30 tablet, Rfl: 1    ipratropium (ATROVENT) 0.06 % nasal spray, 2 sprays by Nasal route 2 times daily, Disp: 3 Bottle, Rfl: 3  Allergies   Allergen Reactions    Iodine Hives    Other      XRAY DYE     Past Surgical History:   Procedure Laterality Date    BACK SURGERY  2000    X 4    COLONOSCOPY N/A 8/11/2020    COLONOSCOPY POLYPECTOMY SNARE/COLD BIOPSY performed by Melquiades Roger MD at [facility]  2013   [address]     HEMORRHOID SURGERY      internal hemorrhoid banding- Dr. Ting Bateman in early 26ths     Family History   Problem Relation Age of Onset    Arthritis Mother         back    Other Mother         balance    Diabetes type 2  Father 72    Coronary Art Dis Father 72    Hypertension Father     Arthritis Father     Colon Cancer Maternal Grandmother     Cancer Brother         melanoma    Breast Cancer Maternal Aunt     Breast Cancer Maternal Cousin        Social History:   Social History     Tobacco Use    Smoking status: Never Smoker    Smokeless tobacco: Never Used   Substance Use Topics    Alcohol use: Yes     Comment: socially      Tobacco cessation counseling provided as appropriate. REVIEW OF SYSTEMS:    Pertinent positives and negatives are mentioned in the HPI. Otherwise, all other systems were reviewed and negative. Objective:     Physical Exam   /89   Pulse 75   Temp 96.6 °F (35.9 °C) (Infrared)   Ht 5' 10\" (1.778 m)   Wt 138 lb (62.6 kg)   SpO2 99%   BMI 19.80 kg/m²   Constitutional: Appears well-developed and well-nourished. Grooming appropriate. No gross deformities. Body mass index is 19.8 kg/m². Eyes: No scleral icterus. Conjunctiva/lids normal. Vision intact grossly. Pupils equal/symmetric, reactive bilaterally. ENT: External ears/nose without defect, scars, or masses. Hearing grossly intact. No facial deformity. Lips normal, normal dentition. Neck: No masses. Trachea midline. No crepitus. Thyroid not enlarged. Cardiovascular: Normal rate.   No peripheral edema. Abdominal aorta normal size to palpation. Pulmonary/Chest: Effort normal. No respiratory distress. No wheezes. No use of accessory muscles. Musculoskeletal: Normal range of motion x all 4 extremities and head/neck, without deformity, pain, or crepitus, with normal strength and tone. Normal gait. Nails without clubbing or cyanosis. Neurological: Alert and oriented to person, place, and time. No gross deficits. Sensation intact. Skin: Skin is dry. No rashes noted. No pallor. No induration of nodules. Psychiatric: Normal mood and affect. Behavior normal. Oriented to person, place, and time. Judgment and insight reasonable. Abdominal/wound: Soft, nontender, nondistended    Labs reviewed: none     Imaging reviewed: none    Assessment/Plan:       A/P:  Established problem(s): Int/ext hemorrhoids  Additional workup/treatment planned: referral for second opinion to Dr Toni Murphy, possible hemorrhiodectomy  Risk of complications/morbidity: moderate    At this point Sneha Rosen has not responded to multiple sessions of rubber band ligation. I again discussed with her hemorrhoidectomy surgery, but again stressed the postoperative discomfort for up to 3 weeks, and the possibility of abnormal healing process and even recurrence of hemorrhoids. I discussed with her the risks of anal stenosis, as she does have multiple external tags and hemorrhoids. We discussed the possibility of sphincter damage, and possibility of not helping her with her original symptoms. I provided her with rough estimations of success, but overall she did not seem to be satisfied with our discussion or my counseling. She does not feel like she is able to make a decision at this point. I offered her more time or a second opinion. She would like to pursue a second opinion, which I think is reasonable. I will go ahead and arrange for her to see Dr. Toni Murphy for a second opinion.   She can let us know after she sees him whether what she would like to do. DISPOSITION:  Second opinion with Dr Stephanie Ziegler    My findings will be relayed to consulting practitioner or PCP via Epic note    Total encounter time:  15 min with > 50% spent with face-to-face counseling and coordination of care, including: Counseling and discussion, coordination of care as above    Note completed using dictation software, please excuse any errors.     Electronically signed by Rosa Joseph MD on 10/6/2020 at 3:43 PM

## 2020-10-07 ENCOUNTER — PATIENT MESSAGE (OUTPATIENT)
Dept: FAMILY MEDICINE CLINIC | Age: 55
End: 2020-10-07

## 2020-10-07 ENCOUNTER — OFFICE VISIT (OUTPATIENT)
Dept: SURGERY | Age: 55
End: 2020-10-07
Payer: COMMERCIAL

## 2020-10-07 VITALS
BODY MASS INDEX: 19.47 KG/M2 | DIASTOLIC BLOOD PRESSURE: 94 MMHG | SYSTOLIC BLOOD PRESSURE: 140 MMHG | HEIGHT: 70 IN | TEMPERATURE: 96.9 F | WEIGHT: 136 LBS | HEART RATE: 76 BPM

## 2020-10-07 PROCEDURE — 99212 OFFICE O/P EST SF 10 MIN: CPT | Performed by: SURGERY

## 2020-10-07 NOTE — TELEPHONE ENCOUNTER
From: Kaylin Batres  To: Power Caldwell MD  Sent: 10/7/2020 3:29 PM EDT  Subject: Visit Keli Beckford,    Since our virtual visit I've seen Dr. Lyn, Dr. Paty Villegas and Dr. Jose Luis Snyder for issues related to my hemorrhoids. Both Dr. Lyn and Dr. Paty Villegas performed rubber band ligation multiple times. Dr. Paty Villegas referred me to Dr. Jose Luis Snyder for a second opinion. I saw Dr. Jose Luis Snyder today and he is suggesting a referral out of network to Dr. Noy Driverla, who I believe is with [facility]. Dr. Jose Luis Snyder suggested that Dr. Roger Dahl could advise me on a doppler guided dearterization, which they are not able to perform. Can you make that referral? Or do you know any physicians with [facility] that perform this procedure and can advise me? Thank you.

## 2020-10-07 NOTE — PROGRESS NOTES
Subjective:     Patient is a 63 y.o. woman with hemorrhoids    HPI: Ms. Marie Black has a several year history of hemorrhoid symptoms. Her chief complaint is prolapse of the hemorrhoid tissue along with occasional bleeding and pain. She saw Dr. Ceja Every in 2010 where Doppler ligation was entertained but never completed. She has had numerous hemorrhoid bandings with no relief. She feels like the hemorrhoids are running her life. She has difficulty getting clean after BM. She reports no prior deliveries. She is not incontinent. Patient Active Problem List    Diagnosis Date Noted    Anxiety     Lumbar foraminal stenosis     DDD (degenerative disc disease), cervical     Basal cell carcinoma     History of melanoma 2000     Past Medical History:   Diagnosis Date    Anxiety     Basal cell carcinoma     chest , stomach    DDD (degenerative disc disease), cervical     L4-5; L5-S1    History of melanoma 2000    right thigh     Infertility, female     Lumbar foraminal stenosis     Menopause ovarian failure     Menorrhagia     Migraine     Ovarian cyst       Past Surgical History:   Procedure Laterality Date    BACK SURGERY  2000    X 4    COLONOSCOPY N/A 8/11/2020    COLONOSCOPY POLYPECTOMY SNARE/COLD BIOPSY performed by Juan Guzman MD at [address] [place] [address]  2013    FOOT SURGERY Left     HEMORRHOID SURGERY      internal hemorrhoid banding- Dr. Curt Sanchez in early 26ths      Not in a hospital admission.   Allergies   Allergen Reactions    Iodine Hives    Other      XRAY DYE      Social History     Tobacco Use    Smoking status: Never Smoker    Smokeless tobacco: Never Used   Substance Use Topics    Alcohol use: Yes     Comment: socially      Family History   Problem Relation Age of Onset    Arthritis Mother         back    Other Mother         balance    Diabetes type 2  Father 72    Coronary Art Dis Father 72    Hypertension Father  Arthritis Father     Colon Cancer Maternal Grandmother     Cancer Brother         melanoma    Breast Cancer Maternal Aunt     Breast Cancer Maternal Cousin       Review of Systems    GI: reviewed and negative except as noted in HPI. + bleeding, + occasional rectal pain. Objective:     GEN: appears well, no distress, appears stated age  PSYCH: normal mood, normal affect  ENT: moist oral mucosa; anicteric  SKIN: no rash or jaundice  RECTAL: circumferential excess skin. Prolapsing internal hemorrhoids are small but near circumferential. Examined with Shayna Ramos MA present as chaperone. Np fissure. No pain on exam.  EXT/NEURO: normal gait, strength/sensation grossly intact in all extremities      Assessment:     Hemorrhoids    Plan:     Discussed alternative therapies for hemorrhoid surgery including doppler de-arterialization, stapled hemorrhoidopexy, infrared coagulation hemorrhoidectomy. Discussed pelvic floor physical therapy as a non operative option that may help strengthen her muscles. I agree with Dr. Emili Banks that excisional hemorrhoidectomy has a risk of leaving extensive skin behind with minimal relief of her primary complaint and that excess removal could result in stenosis. Discussed given circumferential disease she is a good candidate for a doppler guided operation and this would likely be less painful than a hemorrhoid excision. She had previously planned for this. Will refer back to Dr. Loly Cross as neither my partner nor I offer this operation.     Tayla Deleon M.D.  10/7/20   11:40 AM

## 2020-10-08 NOTE — TELEPHONE ENCOUNTER
I am certainly okay to do referral, but patient likely has to contact HR if she has [employer] to get an exemption to go out of network. I am not aware of other options within [facility], as she has already seen several specialists. I am certainly fine with doing what is needed for her ,  but she will have to get the information.

## 2020-10-12 ENCOUNTER — PATIENT MESSAGE (OUTPATIENT)
Dept: FAMILY MEDICINE CLINIC | Age: 55
End: 2020-10-12

## 2020-10-12 NOTE — TELEPHONE ENCOUNTER
From: Dee HEALY  To: Kaylin Batres  Sent: 10/12/2020 12:00 PM EDT  Subject: Reply from Dr. Bernie Holland    I am certainly okay to do referral, but patient likely has to contact HR if she has [employer] to get an exemption to go out of network. I am not aware of other options within [address], as she has already seen several specialists. I am certainly fine with doing what is needed for her ,  but she will have to get the information.

## 2020-10-14 ENCOUNTER — PATIENT MESSAGE (OUTPATIENT)
Dept: FAMILY MEDICINE CLINIC | Age: 55
End: 2020-10-14

## 2020-10-14 NOTE — TELEPHONE ENCOUNTER
From: Kaylin Batres  To: NOAH HEALY  Sent: 10/14/2020 2:52 PM EDT  Subject: RE:Reply from Dr. Blair Marquez    Yes you can send it to [facility], sorry for the confusion. Did Dr. Blair Marquez have a preference for either physician, Dr. Colin Flores or Betito Ayers?      ----- Message -----   From:NEFTALI KELSEY   SUFD:88/59/4132 11:54 AM EDT   To:Kaylin Batres   Subject:RE:Reply from Dr. Cristina Perry   Just to be clear, can't we send this to [facility]? Thank you. Victorina LINDSAY       ----- Message -----   From:Kaylin Batres   Sent:10/13/2020 10:57 AM EDT   To:NOAH HEALY   Subject:RE:Reply from Dr. Blair Marquez    I have attached the Network Exception Waiver to be completed by Dr. Anaya Beltran. Please let me know if you have additional questions or if I need to provide more information. Dr. Sissy Palafox mentioned Dr. Colin Flores and I've also heard that Dr. Betito Ayers is good as well. I'm open to Dr. Alana Brink feedback on both. I'm anxious to keep this moving as it's late in the year and I've spent quite a bit out of pocket to reach my max. Thank you very much for your help.      ----- Message -----   From:NEFTALI Bailey   PL:68/16/2230 5:15 PM EDT   To:Kaylin Batres   Subject:RE:Reply from Dr. Anabell Kellogg, please contact  for an exemption to go out of network. Thank you. NEFTALI Farley      ----- Message -----   From:Kaylin Batres   Sent:10/12/2020 12:54 PM EDT   To:NOAH HEALY   Subject:RE:Reply from Dr. Blair Marquez    Thank you, what do you need from me to proceed with the referral? An email from ? I'd like to schedule quickly so appreciate your prompt response.      ----- Message -----   From:NOAH HEALY   Sent:10/12/2020 12:00 PM EDT   To:IsidoroRachel Gisel   Subject:Reply from Dr. Pinto Fears    I am certainly okay to do referral, but patient likely has to contact HR if she has [employer] to get an exemption to go out of network. I am not aware of other options within [facility], as she has already seen several specialists.  I am certainly fine with doing what is needed for her , but she will have to get the information.

## 2020-10-14 NOTE — TELEPHONE ENCOUNTER
Form is complete and signed by pcp MM. Tried calling pt to confirm where it needs to be sent. Left message to call back and ask for Victorina. Also sent a mychart message.

## 2020-10-19 VITALS — WEIGHT: 134 LBS | BODY MASS INDEX: 19.23 KG/M2

## 2020-11-19 ENCOUNTER — OFFICE VISIT (OUTPATIENT)
Dept: PRIMARY CARE CLINIC | Age: 55
End: 2020-11-19
Payer: COMMERCIAL

## 2020-11-19 ENCOUNTER — OFFICE VISIT (OUTPATIENT)
Dept: FAMILY MEDICINE CLINIC | Age: 55
End: 2020-11-19
Payer: COMMERCIAL

## 2020-11-19 VITALS
RESPIRATION RATE: 16 BRPM | OXYGEN SATURATION: 97 % | DIASTOLIC BLOOD PRESSURE: 80 MMHG | SYSTOLIC BLOOD PRESSURE: 127 MMHG | TEMPERATURE: 97.3 F | BODY MASS INDEX: 19.8 KG/M2 | HEART RATE: 68 BPM | WEIGHT: 138 LBS

## 2020-11-19 PROCEDURE — 99243 OFF/OP CNSLTJ NEW/EST LOW 30: CPT | Performed by: FAMILY MEDICINE

## 2020-11-19 PROCEDURE — 99211 OFF/OP EST MAY X REQ PHY/QHP: CPT | Performed by: NURSE PRACTITIONER

## 2020-11-19 PROCEDURE — 93000 ELECTROCARDIOGRAM COMPLETE: CPT | Performed by: FAMILY MEDICINE

## 2020-11-19 NOTE — PROGRESS NOTES
Chief Complaint:     Romana Lederer is a 63 y.o. female who presents for a preoperative physical examination. She is scheduled to have  done by Dr. Carey Renee at [address] [place] [address] on 11/25/2020  ( Fax  # [phone]). History of Present Illness:      Patient with internal hemorrhoids . She had rubber band ligation - 3 with Dr. Lyn and 2 with Dr. Kimberley Mejia. Dr. Mary Stacy referred her to Dr. Carey Renee. Intermittent pain and bleeding. Some rectal drainage. Her bowel movements are qod. Some straining. Patient denies chest pain, palpitations, or shortness of breath . No personal or family history of bleeding, clotting, or anesthesia problems.      Past Medical History:   Diagnosis Date    Anxiety     Basal cell carcinoma     chest , stomach    DDD (degenerative disc disease), cervical     L4-5; L5-S1    History of melanoma 2000    right thigh     Infertility, female     Lumbar foraminal stenosis     Menopause ovarian failure     Menorrhagia     Migraine     Ovarian cyst         Review of patient's past surgical history indicates:     Past Surgical History:   Procedure Laterality Date    BACK SURGERY  2000    X 4    COLONOSCOPY N/A 8/11/2020    COLONOSCOPY POLYPECTOMY SNARE/COLD BIOPSY performed by Wil aCzares MD at [address] [place] [address]  2013    FOOT SURGERY Left     HEMORRHOID SURGERY      internal hemorrhoid banding- Dr. Myra Peña in early 26ths                                                   Current Outpatient Medications   Medication Sig Dispense Refill    MIMVEY 1-0.5 MG per tablet TAKE 1 TABLET BY MOUTH EVERY DAY 90 tablet 1    escitalopram (LEXAPRO) 10 MG tablet Take 1 tablet by mouth daily 90 tablet 2    butalbital-acetaminophen-caffeine (FIORICET) -40 MG per tablet Take 1 tablet by mouth every 4 hours as needed for Headaches 30 tablet 1    ipratropium (ATROVENT) 0.06 % nasal spray 2 tenderness. Lungs - Percussion normal. Good diaphragmatic excursion. Lungs clear  Heart - Regular rate and rhythm, with no rub, murmur or gallop noted. No edema. Abdomen - Abdomen soft, non-tender. BS normal. No masses, organomegaly  Extremities - Extremities normal. No deformities, edema, or skin discoloration. Musculoskeletal - Spine ROM normal. Muscular strength intact. Peripheral pulses - radial=4/4,, femoral=4/4, popliteal=4/4, dorsalis pedis=4/4,  Neuro - Gait normal. Reflexes normal and symmetric. Sensation grossly normal.  No focal weakness    EKG: normal EKG, normal sinus rhythm, RBBB. Assessment:        Per encounter diagnoses  She is medically cleared for surgery and anesthesia. Avoid Aspirin, non steroidal anti inflammatory medications, including Motrin, Aleve, Ibuprofen, Advil; multi vitamins, Vitamin E, omega 3 fish oil, and glucosamine chondroitin for the 7 days prior to surgery. 1. Pre-op exam  - EKG 12 Lead  - Medically cleared. 2. Internal hemorrhoids  - Scheduled for surgery     3. Anxiety  - stable. Continue Lexapro 10 mg qd. 4. Lumbar foraminal stenosis  - stable. 5. DDD (degenerative disc disease), cervical  - stable. Plan:        Per operating surgeon. See also orders filed with this encounter, if any.

## 2020-11-20 ENCOUNTER — PATIENT MESSAGE (OUTPATIENT)
Dept: FAMILY MEDICINE CLINIC | Age: 55
End: 2020-11-20

## 2020-11-20 LAB — SARS-COV-2: NOT DETECTED

## 2020-12-01 ENCOUNTER — HOSPITAL ENCOUNTER (EMERGENCY)
Age: 55
Discharge: HOME | End: 2020-12-01
Attending: EMERGENCY MEDICINE
Payer: COMMERCIAL

## 2020-12-01 ENCOUNTER — NURSE TRIAGE (OUTPATIENT)
Dept: OTHER | Facility: CLINIC | Age: 55
End: 2020-12-01

## 2020-12-01 VITALS
DIASTOLIC BLOOD PRESSURE: 94 MMHG | HEIGHT: 70 IN | OXYGEN SATURATION: 100 % | HEART RATE: 85 BPM | BODY MASS INDEX: 19.33 KG/M2 | RESPIRATION RATE: 20 BRPM | WEIGHT: 135 LBS | TEMPERATURE: 97.6 F | SYSTOLIC BLOOD PRESSURE: 164 MMHG

## 2020-12-01 LAB
ANION GAP SERPL CALCULATED.3IONS-SCNC: 11 MMOL/L (ref 3–16)
BASOPHILS ABSOLUTE: 0 K/UL (ref 0–0.2)
BASOPHILS RELATIVE PERCENT: 0.4 %
BILIRUBIN URINE: NEGATIVE
BLOOD, URINE: NEGATIVE
BUN BLDV-MCNC: 10 MG/DL (ref 7–20)
CALCIUM SERPL-MCNC: 9.7 MG/DL (ref 8.3–10.6)
CHLORIDE BLD-SCNC: 100 MMOL/L (ref 99–110)
CLARITY: CLEAR
CO2: 24 MMOL/L (ref 21–32)
COLOR: YELLOW
CREAT SERPL-MCNC: 0.6 MG/DL (ref 0.6–1.1)
EOSINOPHILS ABSOLUTE: 0.1 K/UL (ref 0–0.6)
EOSINOPHILS RELATIVE PERCENT: 0.9 %
GFR AFRICAN AMERICAN: >60
GFR NON-AFRICAN AMERICAN: >60
GLUCOSE BLD-MCNC: 94 MG/DL (ref 70–99)
GLUCOSE URINE: NEGATIVE MG/DL
HCT VFR BLD CALC: 44.8 % (ref 36–48)
HEMOGLOBIN: 15.9 G/DL (ref 12–16)
KETONES, URINE: NEGATIVE MG/DL
LEUKOCYTE ESTERASE, URINE: NEGATIVE
LYMPHOCYTES ABSOLUTE: 1 K/UL (ref 1–5.1)
LYMPHOCYTES RELATIVE PERCENT: 15.9 %
MCH RBC QN AUTO: 33.2 PG (ref 26–34)
MCHC RBC AUTO-ENTMCNC: 35.5 G/DL (ref 31–36)
MCV RBC AUTO: 93.6 FL (ref 80–100)
MICROSCOPIC EXAMINATION: NORMAL
MONOCYTES ABSOLUTE: 0.5 K/UL (ref 0–1.3)
MONOCYTES RELATIVE PERCENT: 8.5 %
NEUTROPHILS ABSOLUTE: 4.7 K/UL (ref 1.7–7.7)
NEUTROPHILS RELATIVE PERCENT: 74.3 %
NITRITE, URINE: NEGATIVE
PDW BLD-RTO: 12.1 % (ref 12.4–15.4)
PH UA: 7 (ref 5–8)
PLATELET # BLD: 230 K/UL (ref 135–450)
PMV BLD AUTO: 8.5 FL (ref 5–10.5)
POTASSIUM REFLEX MAGNESIUM: 4.3 MMOL/L (ref 3.5–5.1)
PROTEIN UA: NEGATIVE MG/DL
RBC # BLD: 4.78 M/UL (ref 4–5.2)
SODIUM BLD-SCNC: 135 MMOL/L (ref 136–145)
SPECIFIC GRAVITY UA: 1.01 (ref 1–1.03)
URINE TYPE: NORMAL
UROBILINOGEN, URINE: 0.2 E.U./DL
WBC # BLD: 6.3 K/UL (ref 4–11)

## 2020-12-01 PROCEDURE — 51702 INSERT TEMP BLADDER CATH: CPT

## 2020-12-01 PROCEDURE — 85025 COMPLETE CBC W/AUTO DIFF WBC: CPT

## 2020-12-01 PROCEDURE — 6360000002 HC RX W HCPCS: Performed by: PHYSICIAN ASSISTANT

## 2020-12-01 PROCEDURE — 96375 TX/PRO/DX INJ NEW DRUG ADDON: CPT

## 2020-12-01 PROCEDURE — 99284 EMERGENCY DEPT VISIT MOD MDM: CPT

## 2020-12-01 PROCEDURE — 80048 BASIC METABOLIC PNL TOTAL CA: CPT

## 2020-12-01 PROCEDURE — 81003 URINALYSIS AUTO W/O SCOPE: CPT

## 2020-12-01 PROCEDURE — 51798 US URINE CAPACITY MEASURE: CPT

## 2020-12-01 PROCEDURE — 96374 THER/PROPH/DIAG INJ IV PUSH: CPT

## 2020-12-01 PROCEDURE — 6370000000 HC RX 637 (ALT 250 FOR IP): Performed by: PHYSICIAN ASSISTANT

## 2020-12-01 RX ORDER — MORPHINE SULFATE 4 MG/ML
4 INJECTION, SOLUTION INTRAMUSCULAR; INTRAVENOUS ONCE
Status: COMPLETED | OUTPATIENT
Start: 2020-12-01 | End: 2020-12-01

## 2020-12-01 RX ORDER — LIDOCAINE HYDROCHLORIDE 20 MG/ML
JELLY TOPICAL ONCE
Status: COMPLETED | OUTPATIENT
Start: 2020-12-01 | End: 2020-12-01

## 2020-12-01 RX ORDER — OXYCODONE HYDROCHLORIDE AND ACETAMINOPHEN 5; 325 MG/1; MG/1
1 TABLET ORAL ONCE
Status: COMPLETED | OUTPATIENT
Start: 2020-12-01 | End: 2020-12-01

## 2020-12-01 RX ORDER — KETOROLAC TROMETHAMINE 30 MG/ML
15 INJECTION, SOLUTION INTRAMUSCULAR; INTRAVENOUS ONCE
Status: COMPLETED | OUTPATIENT
Start: 2020-12-01 | End: 2020-12-01

## 2020-12-01 RX ORDER — LIDOCAINE HYDROCHLORIDE 20 MG/ML
10 SOLUTION OROPHARYNGEAL 2 TIMES DAILY PRN
Qty: 1 BOTTLE | Refills: 0 | Status: SHIPPED | OUTPATIENT
Start: 2020-12-01 | End: 2021-06-02

## 2020-12-01 RX ADMIN — OXYCODONE HYDROCHLORIDE AND ACETAMINOPHEN 1 TABLET: 5; 325 TABLET ORAL at 11:08

## 2020-12-01 RX ADMIN — MORPHINE SULFATE 4 MG: 4 INJECTION INTRAVENOUS at 12:17

## 2020-12-01 RX ADMIN — OXYCODONE HYDROCHLORIDE AND ACETAMINOPHEN 1 TABLET: 5; 325 TABLET ORAL at 15:02

## 2020-12-01 RX ADMIN — LIDOCAINE HYDROCHLORIDE: 20 JELLY TOPICAL at 13:37

## 2020-12-01 RX ADMIN — KETOROLAC TROMETHAMINE 15 MG: 30 INJECTION, SOLUTION INTRAMUSCULAR at 12:40

## 2020-12-01 ASSESSMENT — PAIN DESCRIPTION - LOCATION: LOCATION: ABDOMEN

## 2020-12-01 ASSESSMENT — ENCOUNTER SYMPTOMS
VOMITING: 0
ABDOMINAL PAIN: 1
CONSTIPATION: 1
SHORTNESS OF BREATH: 0
EYE REDNESS: 0
NAUSEA: 1

## 2020-12-01 ASSESSMENT — PAIN SCALES - GENERAL
PAINLEVEL_OUTOF10: 7
PAINLEVEL_OUTOF10: 10

## 2020-12-01 ASSESSMENT — PAIN DESCRIPTION - PAIN TYPE
TYPE: ACUTE PAIN
TYPE: ACUTE PAIN

## 2020-12-01 ASSESSMENT — PAIN DESCRIPTION - DESCRIPTORS: DESCRIPTORS: OTHER (COMMENT)

## 2020-12-01 ASSESSMENT — PAIN SCALES - WONG BAKER: WONGBAKER_NUMERICALRESPONSE: 0

## 2020-12-01 NOTE — TELEPHONE ENCOUNTER
Reason for Disposition   [1] Unable to urinate (or only a few drops) > 4 hours AND     [2] bladder feels very full (e.g., palpable bladder or strong urge to urinate)    Answer Assessment - Initial Assessment Questions  1. SYMPTOM: \"What's the main symptom you're concerned about? \" (e.g., frequency, incontinence)      Painful and difficulty urinating. Had hemorrhoidectomy on 11/25/20    2. ONSET: \"When did the start? \"       Started after procedure. Floral like she never really emptied bladder and started with pain with passing urine and pressure in the pelvic area. Was able to urinate slightly this morning but just dribbling out    3. PAIN: \"Is there any pain? \" If so, ask: \"How bad is it? \" (Scale: 1-10; mild, moderate, severe)    Severe    4. CAUSE: \"What do you think is causing the symptoms? \"   post op surgery    5. OTHER SYMPTOMS: \"Do you have any other symptoms? \" (e.g., fever, flank pain, blood in urine, pain with urination)     No    6. PREGNANCY: \"Is there any chance you are pregnant? \" \"When was your last menstrual period? \"  n/a    Protocols used: [facility]    Patient attempted to reach out to physician but was unable to reach him. Advised to go to the ED.

## 2020-12-01 NOTE — ED PROVIDER NOTES
ED Attending Attestation Note     Date of evaluation: 12/1/2020    This patient was seen by the advance practice provider. I have seen and examined the patient, agree with the workup, evaluation, management and diagnosis. The care plan has been discussed. My assessment reveals here with urinary retention about a week out from a hemorrhoidal artery ligation surgery at [facility]. Patient also has significant pain from procedure. Garduno decompressed bladder 1400mL and patient felt better but continued to have rectal pain. D/W surgeon and will have her followup and use topical steroids and lidocaine.      Milka Kuo MD  12/01/20 1268

## 2020-12-01 NOTE — ED PROVIDER NOTES
[address] [facility] ENCOUNTER          PHYSICIAN ASSISTANT NOTE       Date of evaluation: 12/1/2020    Chief Complaint     Dysuria (Since Friday but worse Sunday)      History of Present Illness     Kaylin Tatum is a 63 y.o. female who presents with difficulty urinating. Patient states that she had a ultrasound guided hemorrhoidal artery ligation procedure last Wednesday 11/25 by Dr. Saeid Mckinney at [place]. She reports that since the procedure, she has felt like she is not really been able to fully empty her bladder and she began experiencing pain and pressure in her lower abdomen. She states that she attempted to urinate this morning and was only able to urinate a very small small amount. She does report the urge to urinate. She denies any associated fevers or chills, nausea or vomiting, dysuria, hematuria. She states that she has had constipation. She is currently on MiraLAX, Colace, Dulcolax and Metamucil. Her last bowel movement was yesterday and it was loose. Review of Systems     Review of Systems   Constitutional: Negative for chills and fever. Eyes: Negative for redness. Respiratory: Negative for shortness of breath. Cardiovascular: Negative for chest pain. Gastrointestinal: Positive for abdominal pain, constipation and nausea. Negative for vomiting. Genitourinary: Positive for difficulty urinating. Negative for dysuria, frequency and hematuria. Musculoskeletal: Negative for gait problem. Skin: Negative for wound. Neurological: Negative for dizziness. Psychiatric/Behavioral: The patient is not nervous/anxious.         Past Medical, Surgical, Family, and Social History     She has a past medical history of Anxiety, Basal cell carcinoma, DDD (degenerative disc disease), cervical, History of melanoma, Infertility, female, Lumbar foraminal stenosis, Menopause ovarian failure, Menorrhagia, Migraine, and Ovarian cyst.  She has a past surgical history that includes back surgery (2000); Endometrial ablation (2013); Foot surgery (Left); Colonoscopy (N/A, 8/11/2020); Skin cancer excision; and Hemorrhoid surgery. Her family history includes Arthritis in her father and mother; Breast Cancer in her maternal aunt and maternal cousin; Cancer in her brother; Colon Cancer in her maternal grandmother; Coronary Art Dis (age of onset: 72) in her father; Diabetes type 2  (age of onset: 72) in her father; Hypertension in her father; Other in her mother. She reports that she has never smoked. She has never used smokeless tobacco. She reports current alcohol use. She reports previous drug use. Drug: Marijuana. Medications     Previous Medications    BUTALBITAL-ACETAMINOPHEN-CAFFEINE (FIORICET) -40 MG PER TABLET    Take 1 tablet by mouth every 4 hours as needed for Headaches    ESCITALOPRAM (LEXAPRO) 10 MG TABLET    Take 1 tablet by mouth daily    IPRATROPIUM (ATROVENT) 0.06 % NASAL SPRAY    2 sprays by Nasal route 2 times daily    MIMVEY 1-0.5 MG PER TABLET    TAKE 1 TABLET BY MOUTH EVERY DAY       Allergies     She is allergic to iodine and other. Physical Exam     INITIAL VITALS: BP: 130/82,Temp: 97.6 °F (36.4 °C), Pulse: 85, Resp: 20, SpO2: 100 %   Physical Exam  Vitals signs and nursing note reviewed. Constitutional:       General: She is not in acute distress. HENT:      Head: Normocephalic and atraumatic. Mouth/Throat:      Mouth: Mucous membranes are moist.      Pharynx: Oropharynx is clear. Eyes:      Extraocular Movements: Extraocular movements intact. Conjunctiva/sclera: Conjunctivae normal.   Neck:      Musculoskeletal: Neck supple. Cardiovascular:      Rate and Rhythm: Normal rate and regular rhythm. Pulmonary:      Effort: Pulmonary effort is normal. No respiratory distress. Breath sounds: Normal breath sounds. No wheezing, rhonchi or rales. Abdominal:      General: Bowel sounds are normal. There is no distension.       Palpations: Abdomen is soft. Tenderness: There is abdominal tenderness in the suprapubic area. There is no guarding or rebound. Genitourinary:     Comments: Circumferential external hemorrhoids. No thrombosed or actively bleeding hemorrhoids. Musculoskeletal:         General: No deformity. Skin:     General: Skin is warm and dry. Neurological:      Mental Status: She is alert and oriented to person, place, and time.    Psychiatric:         Mood and Affect: Mood normal.         Behavior: Behavior normal.         Diagnostic Results     RADIOLOGY:  No orders to display       LABS:   Results for orders placed or performed during the hospital encounter of 12/01/20   Urinalysis, reflex to microscopic   Result Value Ref Range    Color, UA Yellow Straw/Yellow    Clarity, UA Clear Clear    Glucose, Ur Negative Negative mg/dL    Bilirubin Urine Negative Negative    Ketones, Urine Negative Negative mg/dL    Specific Gravity, UA 1.010 1.005 - 1.030    Blood, Urine Negative Negative    pH, UA 7.0 5.0 - 8.0    Protein, UA Negative Negative mg/dL    Urobilinogen, Urine 0.2 <2.0 E.U./dL    Nitrite, Urine Negative Negative    Leukocyte Esterase, Urine Negative Negative    Microscopic Examination Not Indicated     Urine Type NotGiven    CBC Auto Differential   Result Value Ref Range    WBC 6.3 4.0 - 11.0 K/uL    RBC 4.78 4.00 - 5.20 M/uL    Hemoglobin 15.9 12.0 - 16.0 g/dL    Hematocrit 44.8 36.0 - 48.0 %    MCV 93.6 80.0 - 100.0 fL    MCH 33.2 26.0 - 34.0 pg    MCHC 35.5 31.0 - 36.0 g/dL    RDW 12.1 (L) 12.4 - 15.4 %    Platelets 169 902 - 496 K/uL    MPV 8.5 5.0 - 10.5 fL    Neutrophils % 74.3 %    Lymphocytes % 15.9 %    Monocytes % 8.5 %    Eosinophils % 0.9 %    Basophils % 0.4 %    Neutrophils Absolute 4.7 1.7 - 7.7 K/uL    Lymphocytes Absolute 1.0 1.0 - 5.1 K/uL    Monocytes Absolute 0.5 0.0 - 1.3 K/uL    Eosinophils Absolute 0.1 0.0 - 0.6 K/uL    Basophils Absolute 0.0 0.0 - 0.2 K/uL   Basic Metabolic Panel w/ Reflex to MG Result Value Ref Range    Sodium 135 (L) 136 - 145 mmol/L    Potassium reflex Magnesium 4.3 3.5 - 5.1 mmol/L    Chloride 100 99 - 110 mmol/L    CO2 24 21 - 32 mmol/L    Anion Gap 11 3 - 16    Glucose 94 70 - 99 mg/dL    BUN 10 7 - 20 mg/dL    CREATININE 0.6 0.6 - 1.1 mg/dL    GFR Non-African American >60 >60    GFR African American >60 >60    Calcium 9.7 8.3 - 10.6 mg/dL       RECENT VITALS:  BP: (!) 135/93, Temp: 97.6 °F (36.4 °C), Pulse: 85,Resp: 20, SpO2: 100 %     Procedures         ED Course     Nursing Notes, Past Medical Hx, Past Surgical Hx, Social Hx, Allergies, and Family Hx were reviewed. The patient was given the followingmedications:  Orders Placed This Encounter   Medications    oxyCODONE-acetaminophen (PERCOCET) 5-325 MG per tablet 1 tablet    morphine injection 4 mg    ketorolac (TORADOL) injection 15 mg    lidocaine (XYLOCAINE) 2 % jelly    lidocaine viscous hcl (XYLOCAINE) 2 % SOLN solution     Sig: Place 10 mLs rectally 2 times daily as needed for Irritation or Pain     Dispense:  1 Bottle     Refill:  0    Hydrocortisone Acetate 2.5 % CREA     Sig: Apply topically twice daily for 5-7 days. Dispense:  1 Tube     Refill:  0    oxyCODONE-acetaminophen (PERCOCET) 5-325 MG per tablet 1 tablet       CONSULTS:  IP CONSULT TO [address] / SAYRA / Cira Hector is a 63 y.o. female who presents with difficulty urinating. Patient states that she had a ultrasound guided hemorrhoidal dearterializing procedure last Wednesday 11/25 by Dr. Kemal Souza. She reports that she has had difficulty urinating and feels that she has been unable to fully empty her bladder. This morning she states that she urinated only a small amount. She complains of suprapubic abdominal pressure. She is also been constipated despite taking multiple stool softeners. Her last bowel movement was yesterday. She does report some increase in her hemorrhoids.   She denies any fevers or chills, nausea or vomiting, dysuria. Physical exam reveals uncomfortable appearing 67-year-old female in no acute distress. She is afebrile with normal vital signs. Abdomen is soft with tenderness in the suprapubic region without rebound or guarding. Patient given 5mg percocet for her pain. Post void bladder scan revealed >999mL. Conte catheter was placed with 1400mL output. UA negative for infection. After conte was placed, patient reported improvement in lower abdominal pain but increased pain in rectum. IV access was established. Labs including CBC and BMP obtained, which revealed no acute findings. Patient's colorectal surgeon Dr. Sherry Mason was called. He recommended giving lidocaine jelly, IV toradol and IV morphine for symptomatic relief of hemorrhoids. He will plan to see patient in office Monday and can remove conte catheter/void trial then. After lidocaine jelly, patient reports her pain is much improved. She was educated on catheter care by nursing. She will be discharged home with a leg bag and prescriptions for hydrocortisone and lidocaine jelly for her rectal pain/hemorrhoids. She will continue percocet at home for pain and continue use of stool softeners. She is given return precautions. This patient was also evaluated by the attending physician. All care plans were discussed and agreed upon. Clinical Impression     1. Urinary retention        Disposition     PATIENT REFERRED TO:  Marisel Cha  [address] [place] [address]  [phone]    Schedule an appointment as soon as possible for a visit on 12/7/2020      [facility] Emergency Department  [address]  [address]  [phone]    If symptoms worsen      DISCHARGE MEDICATIONS:  New Prescriptions    HYDROCORTISONE ACETATE 2.5 % CREA    Apply topically twice daily for 5-7 days.     LIDOCAINE VISCOUS HCL (XYLOCAINE) 2 % SOLN SOLUTION    Place 10 mLs rectally 2 times daily as needed for Irritation or Pain        DISPOSITION  Discharge.      Ary Granados PA-C  12/01/20 [id number]

## 2020-12-01 NOTE — ED NOTES
Patient discharged to home via family. Written discharge instructions reviewed with understanding. Copy of AVS and signed prescription sent home with patient. Patient able to walk from ED without assistance.        Antony Santa RN  12/01/20 [id number]

## 2020-12-02 ENCOUNTER — CARE COORDINATION (OUTPATIENT)
Dept: CARE COORDINATION | Age: 55
End: 2020-12-02

## 2020-12-02 NOTE — CARE COORDINATION
Patient contacted regarding Seymour Osgood. Discussed COVID-19 related testing which was available at this time. Test results were negative. Patient informed of results, if available? Yes    Was done pre-op for surgery    Care Transition Nurse/ Ambulatory Care Manager contacted the patient by telephone to perform post discharge assessment. Call within 2 business days of discharge: Yes. Verified name and  with patient as identifiers. Provided introduction to self, and explanation of the CTN/ACM role, and reason for call due to risk factors for infection and/or exposure to COVID-19. Patient has been in touch w surgeon and will be picking up overnight cath bag. Has been in touch w surgeon.     No further outreach planned

## 2021-03-21 DIAGNOSIS — Z78.0 MENOPAUSE: ICD-10-CM

## 2021-03-22 RX ORDER — ESTRADIOL AND NORETHINDRONE ACETATE 1; .5 MG/1; MG/1
TABLET ORAL
Qty: 90 TABLET | Refills: 2 | Status: SHIPPED | OUTPATIENT
Start: 2021-03-22 | End: 2021-06-02 | Stop reason: SDUPTHER

## 2021-06-02 ENCOUNTER — OFFICE VISIT (OUTPATIENT)
Dept: FAMILY MEDICINE CLINIC | Age: 56
End: 2021-06-02
Payer: COMMERCIAL

## 2021-06-02 VITALS
HEIGHT: 69 IN | BODY MASS INDEX: 21.45 KG/M2 | OXYGEN SATURATION: 99 % | SYSTOLIC BLOOD PRESSURE: 122 MMHG | HEART RATE: 70 BPM | WEIGHT: 144.8 LBS | DIASTOLIC BLOOD PRESSURE: 74 MMHG | RESPIRATION RATE: 11 BRPM | TEMPERATURE: 97.5 F

## 2021-06-02 DIAGNOSIS — M50.30 DDD (DEGENERATIVE DISC DISEASE), CERVICAL: ICD-10-CM

## 2021-06-02 DIAGNOSIS — M25.542 ARTHRALGIA OF BOTH HANDS: ICD-10-CM

## 2021-06-02 DIAGNOSIS — M25.541 ARTHRALGIA OF BOTH HANDS: ICD-10-CM

## 2021-06-02 DIAGNOSIS — Z11.59 NEED FOR HEPATITIS C SCREENING TEST: ICD-10-CM

## 2021-06-02 DIAGNOSIS — M48.061 LUMBAR FORAMINAL STENOSIS: ICD-10-CM

## 2021-06-02 DIAGNOSIS — Z85.820 HISTORY OF MELANOMA: ICD-10-CM

## 2021-06-02 DIAGNOSIS — Z78.0 MENOPAUSE: ICD-10-CM

## 2021-06-02 DIAGNOSIS — M21.619 BUNION OF GREAT TOE: ICD-10-CM

## 2021-06-02 DIAGNOSIS — Z53.20 HIV SCREENING DECLINED: ICD-10-CM

## 2021-06-02 DIAGNOSIS — G43.009 MIGRAINE WITHOUT AURA AND WITHOUT STATUS MIGRAINOSUS, NOT INTRACTABLE: ICD-10-CM

## 2021-06-02 DIAGNOSIS — F41.9 ANXIETY: ICD-10-CM

## 2021-06-02 DIAGNOSIS — J30.0 VASOMOTOR RHINITIS: ICD-10-CM

## 2021-06-02 DIAGNOSIS — S46.812A STRAIN OF LEFT TRAPEZIUS MUSCLE, INITIAL ENCOUNTER: ICD-10-CM

## 2021-06-02 DIAGNOSIS — Z01.818 PREOP EXAMINATION: Primary | ICD-10-CM

## 2021-06-02 PROCEDURE — 99243 OFF/OP CNSLTJ NEW/EST LOW 30: CPT | Performed by: FAMILY MEDICINE

## 2021-06-02 RX ORDER — ESCITALOPRAM OXALATE 10 MG/1
TABLET ORAL
Qty: 90 TABLET | Refills: 2 | OUTPATIENT
Start: 2021-06-02

## 2021-06-02 RX ORDER — ESTRADIOL AND NORETHINDRONE ACETATE 1; .5 MG/1; MG/1
TABLET ORAL
Qty: 90 TABLET | Refills: 2 | Status: SHIPPED | OUTPATIENT
Start: 2021-06-02 | End: 2021-12-06

## 2021-06-02 RX ORDER — BUTALBITAL, ACETAMINOPHEN AND CAFFEINE 50; 325; 40 MG/1; MG/1; MG/1
1 TABLET ORAL EVERY 4 HOURS PRN
Qty: 30 TABLET | Refills: 1 | Status: SHIPPED | OUTPATIENT
Start: 2021-06-02 | End: 2021-08-10

## 2021-06-02 RX ORDER — ESTRADIOL AND NORETHINDRONE ACETATE 1; .5 MG/1; MG/1
TABLET ORAL
Qty: 90 TABLET | Refills: 2 | OUTPATIENT
Start: 2021-06-02

## 2021-06-02 RX ORDER — CYCLOBENZAPRINE HCL 10 MG
10 TABLET ORAL 3 TIMES DAILY PRN
Qty: 30 TABLET | Refills: 0 | Status: SHIPPED | OUTPATIENT
Start: 2021-06-02 | End: 2021-08-31 | Stop reason: SDUPTHER

## 2021-06-02 RX ORDER — ESCITALOPRAM OXALATE 10 MG/1
10 TABLET ORAL DAILY
Qty: 90 TABLET | Refills: 2 | Status: SHIPPED | OUTPATIENT
Start: 2021-06-02 | End: 2022-05-10 | Stop reason: SDUPTHER

## 2021-06-02 RX ORDER — IPRATROPIUM BROMIDE 42 UG/1
2 SPRAY, METERED NASAL 2 TIMES DAILY
Qty: 3 BOTTLE | Refills: 3 | Status: SHIPPED | OUTPATIENT
Start: 2021-06-02 | End: 2022-05-12 | Stop reason: SDUPTHER

## 2021-06-02 RX ORDER — BUTALBITAL, ACETAMINOPHEN AND CAFFEINE 50; 325; 40 MG/1; MG/1; MG/1
TABLET ORAL
Qty: 30 TABLET | Refills: 1 | OUTPATIENT
Start: 2021-06-02

## 2021-06-02 SDOH — ECONOMIC STABILITY: FOOD INSECURITY: WITHIN THE PAST 12 MONTHS, THE FOOD YOU BOUGHT JUST DIDN'T LAST AND YOU DIDN'T HAVE MONEY TO GET MORE.: NEVER TRUE

## 2021-06-02 SDOH — ECONOMIC STABILITY: FOOD INSECURITY: WITHIN THE PAST 12 MONTHS, YOU WORRIED THAT YOUR FOOD WOULD RUN OUT BEFORE YOU GOT MONEY TO BUY MORE.: NEVER TRUE

## 2021-06-02 ASSESSMENT — PATIENT HEALTH QUESTIONNAIRE - PHQ9
SUM OF ALL RESPONSES TO PHQ QUESTIONS 1-9: 0
2. FEELING DOWN, DEPRESSED OR HOPELESS: 0
SUM OF ALL RESPONSES TO PHQ QUESTIONS 1-9: 0
SUM OF ALL RESPONSES TO PHQ QUESTIONS 1-9: 0
SUM OF ALL RESPONSES TO PHQ9 QUESTIONS 1 & 2: 0
1. LITTLE INTEREST OR PLEASURE IN DOING THINGS: 0

## 2021-06-02 ASSESSMENT — SOCIAL DETERMINANTS OF HEALTH (SDOH): HOW HARD IS IT FOR YOU TO PAY FOR THE VERY BASICS LIKE FOOD, HOUSING, MEDICAL CARE, AND HEATING?: NOT HARD AT ALL

## 2021-06-02 NOTE — PROGRESS NOTES
Chief Complaint:     Daniela Junior is a 61 y.o. female who presents for a preoperative physical examination. She is scheduled to have Rhinoplasty done by Dr. Ti Rajan at [facility] on 6/21/21. History of Present Illness:      Patient is scheduled for elective surgery . Patient denies chest pain, palpitations, or shortness of breath . No personal or family history of bleeding, clotting, or anesthesia problems. She has right foot pain at base of great toe X 6 months. She works from home and recently flared with wearing heels. She is having right index finger and left third finger. She injured fingers independently 6 months ago while walking dog and got pulled and flared symptoms . ([employer] - 80 lbs and has a smaller dog). Left sided neck pain off and on for months . Takes Advil and icy hot patches. No radiation or weakness to LUE.      Past Medical History:   Diagnosis Date    Anxiety     Basal cell carcinoma     chest , stomach    DDD (degenerative disc disease), cervical     L4-5; L5-S1    History of melanoma 2000    right thigh     Infertility, female     Lumbar foraminal stenosis     Menopause ovarian failure     Menorrhagia     Migraine     Ovarian cyst         Review of patient's past surgical history indicates:     Past Surgical History:   Procedure Laterality Date    BACK SURGERY  2000    X 4    COLONOSCOPY N/A 8/11/2020    COLONOSCOPY POLYPECTOMY SNARE/COLD BIOPSY performed by Payton Dupree MD at [address] [place] [address]  2013    FOOT SURGERY Left     HEMORRHOID SURGERY      internal hemorrhoid banding- Dr. Le Jay in early 26ths                                                   Current Outpatient Medications   Medication Sig Dispense Refill    estradiol-norethindrone (ACTIVELLA) 1-0.5 MG per tablet TAKE 1 TABLET BY MOUTH ONE TIME A DAY 90 tablet 2    escitalopram (LEXAPRO) 10 MG tablet Take 1 tablet by mouth daily 90 tablet 2    ipratropium (ATROVENT) 0.06 % nasal spray 2 sprays by Nasal route 2 times daily 3 Bottle 3    butalbital-acetaminophen-caffeine (FIORICET) -40 MG per tablet Take 1 tablet by mouth every 4 hours as needed for Headaches (Patient not taking: Reported on 6/2/2021) 30 tablet 1     No current facility-administered medications for this visit. Allergies   Allergen Reactions    Iodine Hives    Other      XRAY DYE       Social History     Tobacco Use    Smoking status: Never Smoker    Smokeless tobacco: Never Used   Vaping Use    Vaping Use: Never used   Substance Use Topics    Alcohol use: Yes     Comment: socially    Drug use: Not Currently     Types: Marijuana        Family History   Problem Relation Age of Onset    Arthritis Mother         back    Other Mother         balance    Diabetes type 2  Father 72    Coronary Art Dis Father 72    Hypertension Father     Arthritis Father     Colon Cancer Maternal Grandmother     Cancer Brother         melanoma    Breast Cancer Maternal Aunt     Breast Cancer Maternal Cousin         Review Of Systems    Skin: no abnormal pigmentation, rash, scaling, itching, masses, hair or nail changes  Eyes: negative  Ears/Nose/Throat: negative  Respiratory: negative  Cardiovascular: negative  Gastrointestinal: negative  Genitourinary: negative  Musculoskeletal: negative  Neurologic: negative  Psychiatric: negative  Hematologic/Lymphatic/Immunologic: negative  Endocrine: negative       Objective:      /74   Pulse 70   Temp 97.5 °F (36.4 °C)   Resp 11   Ht 5' 8.5\" (1.74 m)   Wt 144 lb 12.8 oz (65.7 kg)   SpO2 99%   BMI 21.69 kg/m²   General appearance - healthy, alert, no distress  Skin - Skin color, texture, turgor normal. No rashes or lesions. Head - Normocephalic. No masses, lesions, tenderness or abnormalities  Eyes - conjunctivae/corneas clear. PERRLA, EOM's intact.   Ears - External ears normal. Canals clear. TM's normal.  Nose/Sinuses - Nares normal. Septum midline. Mucosa normal. No drainage or sinus tenderness. Oropharynx - Lips, mucosa, and tongue normal. Teeth and gums normal. Oropharynx  clear  Neck - tenderness to bilateral trapezius. Some decreased rotation due to pain. No adenopathy. Thyroid symmetric, normal size,  Upper extremities : DTRs 2+ biceps/ triceps/ brachioradialis bilateral.  FROM. Strength 5/5. Back - Back symmetric, no curvature. ROM normal. No CVA tenderness. Lungs - Percussion normal. Good diaphragmatic excursion. Lungs clear  Heart - Regular rate and rhythm, with no rub, murmur or gallop noted. No edema. Abdomen - Abdomen soft, non-tender. BS normal. No masses, organomegaly  Extremities - Extremities normal. No deformities, edema, or skin discoloration. Musculoskeletal - Spine ROM normal. Muscular strength intact. Right foot: tenderness to base of great toe. Slightly swollen. Hands: swelling to right index finger and left 3rd / middle finger. FROM. Strength 5/5. Peripheral pulses - radial=4/4,, femoral=4/4, popliteal=4/4, dorsalis pedis=4/4,  Neuro - Gait normal. Reflexes normal and symmetric. Sensation grossly normal.  No focal weakness    EKG: normal sinus rhythm. 11/20 . RBBB . Unchanged from prior. Assessment:        Per encounter diagnoses  She is medically cleared for surgery and anesthesia. Avoid Aspirin, non steroidal anti inflammatory medications, including Motrin, Aleve, Ibuprofen, Advil; multi vitamins, Vitamin E, omega 3 fish oil, and glucosamine chondroitin for the 7 days prior to surgery. 1. Preop examination  - CBC; Future  - Comprehensive Metabolic Panel; Future  - Medical clearance pending labs. 2. Vasomotor rhinitis  - Stable. - ipratropium (ATROVENT) 0.06 % nasal spray; 2 sprays by Nasal route 2 times daily  Dispense: 3 Bottle; Refill: 3    3. Anxiety  - Stable. Continue Lexapro 10 mg qd. - escitalopram (LEXAPRO) 10 MG tablet;  Take 1 tablet by mouth daily  Dispense: 90 tablet; Refill: 2    4. Menopause  - Stable . Continue Activella qd. - estradiol-norethindrone (ACTIVELLA) 1-0.5 MG per tablet; TAKE 1 TABLET BY MOUTH ONE TIME A DAY  Dispense: 90 tablet; Refill: 2    5. History of melanoma  - stable. Encouraged to keep upcoming appointment with Dermatologist in 7/21. 6. DDD (degenerative disc disease), cervical  - stable. Moist heat . ROM exercises. Modify activities. 7. Lumbar foraminal stenosis  - stable. Moist heat . ROM exercises. Modify activities. 8. Migraine without aura and without status migrainosus, not intractable  - Stable. - butalbital-acetaminophen-caffeine (FIORICET) -40 MG per tablet; Take 1 tablet by mouth every 4 hours as needed for Headaches  Dispense: 30 tablet; Refill: 1    9. Bunion of great toe  - recommended good supportive footwear and avoid wearing heels. Patient is reluctant to avoid wearing heels. - Discussed orthopedic foot specialist versus podiatry referral.     10. Arthralgia of both hands  - CBC; Future  - Comprehensive Metabolic Panel; Future  - Sedimentation Rate; Future  - Cyclic Citrul Peptide Antibody, IgG; Future  - Uric Acid; Future  - Rheumatoid Factor; Future  - JITENDRA; Future    11. Strain of left trapezius muscle, initial encounter  - Moist heat . ROM exercises. Modify activities. Work station ergonomics discussed. - cyclobenzaprine (FLEXERIL) 10 MG tablet; Take 1 tablet by mouth 3 times daily as needed for Muscle spasms  Dispense: 30 tablet; Refill: 0  - Hold NSAIDs given upcoming surgery, but can resume when able prn.   - Hold on Physical Therapy . 12. HIV screening declined  - HIV Screen; Future    13. Need for hepatitis C screening test  - Hepatitis C Antibody; Future          Plan:        Per operating surgeon. See also orders filed with this encounter, if any. Follow up 3 -6 months/ prn. ADDENDUM (6/6/21): Medically cleared for surgery.

## 2021-06-02 NOTE — PATIENT INSTRUCTIONS
each side. Neck stretches   6. Look straight ahead, and tip your right ear to your right shoulder. Do not let your left shoulder rise up as you tip your head to the right. 7. Hold for 15 to 30 seconds. 8. Tilt your head to the left. Do not let your right shoulder rise up as you tip your head to the left. 9. Hold for 15 to 30 seconds. 10. Repeat 2 to 4 times to each side. Forward neck flexion   5. Sit in a firm chair, or stand up straight. 6. Bend your head forward. 7. Hold for 15 to 30 seconds. 8. Repeat 2 to 4 times. Lateral (side) bend strengthening   6. With your right hand, place your first two fingers on your right temple. 7. Start to bend your head to the side while using gentle pressure from your fingers to keep your head from bending. 8. Hold for about 6 seconds. 9. Repeat 8 to 12 times. 10. Switch hands and repeat the same exercise on your left side. Forward bend strengthening   5. Place your first two fingers of either hand on your forehead. 6. Start to bend your head forward while using gentle pressure from your fingers to keep your head from bending. 7. Hold for about 6 seconds. 8. Repeat 8 to 12 times. Neutral position strengthening   5. Using one hand, place your fingertips on the back of your head at the top of your neck. 6. Start to bend your head backward while using gentle pressure from your fingers to keep your head from bending. 7. Hold for about 6 seconds. 8. Repeat 8 to 12 times. Chin tuck   5. Lie on the floor with a rolled-up towel under your neck. Your head should be touching the floor. 6. Slowly bring your chin toward your chest.  7. Hold for a count of 6, and then relax for up to 10 seconds. 8. Repeat 8 to 12 times. Follow-up care is a key part of your treatment and safety. Be sure to make and go to all appointments, and call your doctor if you are having problems.  It's also a good idea to know your test results and keep a list of the medicines you take.  Where can you learn more? Go to https://chpepiceweb.[employer] It's what's in store. org and sign in to your [employer] account. Enter M679 in the [facility] box to learn more about \"Neck Strain or Sprain: Rehab Exercises. \"     If you do not have an account, please click on the \"Sign Up Now\" link. Current as of: November 16, 2020               Content Version: 12.8  © 2006-2021 Healthwise, Incorporated. Care instructions adapted under license by [facility] ([facility]). If you have questions about a medical condition or this instruction, always ask your healthcare professional. [address] any warranty or liability for your use of this information.

## 2021-06-04 DIAGNOSIS — Z00.00 ROUTINE GENERAL MEDICAL EXAMINATION AT A HEALTH CARE FACILITY: ICD-10-CM

## 2021-06-04 DIAGNOSIS — Z11.59 NEED FOR HEPATITIS C SCREENING TEST: ICD-10-CM

## 2021-06-04 DIAGNOSIS — K62.5 RECTAL BLEEDING: ICD-10-CM

## 2021-06-04 DIAGNOSIS — Z53.20 HIV SCREENING DECLINED: ICD-10-CM

## 2021-06-04 DIAGNOSIS — M25.542 ARTHRALGIA OF BOTH HANDS: ICD-10-CM

## 2021-06-04 DIAGNOSIS — M25.541 ARTHRALGIA OF BOTH HANDS: ICD-10-CM

## 2021-06-04 LAB
A/G RATIO: 2.5 (ref 1.1–2.2)
ALBUMIN SERPL-MCNC: 4.8 G/DL (ref 3.4–5)
ALP BLD-CCNC: 63 U/L (ref 40–129)
ALT SERPL-CCNC: 14 U/L (ref 10–40)
ANION GAP SERPL CALCULATED.3IONS-SCNC: 11 MMOL/L (ref 3–16)
AST SERPL-CCNC: 17 U/L (ref 15–37)
BILIRUB SERPL-MCNC: 0.4 MG/DL (ref 0–1)
BUN BLDV-MCNC: 11 MG/DL (ref 7–20)
CALCIUM SERPL-MCNC: 9.3 MG/DL (ref 8.3–10.6)
CHLORIDE BLD-SCNC: 98 MMOL/L (ref 99–110)
CHOLESTEROL, TOTAL: 221 MG/DL (ref 0–199)
CO2: 25 MMOL/L (ref 21–32)
CREAT SERPL-MCNC: 0.7 MG/DL (ref 0.6–1.1)
GFR AFRICAN AMERICAN: >60
GFR NON-AFRICAN AMERICAN: >60
GLOBULIN: 1.9 G/DL
GLUCOSE BLD-MCNC: 88 MG/DL (ref 70–99)
HCT VFR BLD CALC: 45.6 % (ref 36–48)
HDLC SERPL-MCNC: 56 MG/DL (ref 40–60)
HEMOGLOBIN: 16.1 G/DL (ref 12–16)
HEPATITIS C ANTIBODY INTERPRETATION: NORMAL
LDL CHOLESTEROL CALCULATED: 133 MG/DL
MCH RBC QN AUTO: 33.3 PG (ref 26–34)
MCHC RBC AUTO-ENTMCNC: 35.4 G/DL (ref 31–36)
MCV RBC AUTO: 94.2 FL (ref 80–100)
PDW BLD-RTO: 12.6 % (ref 12.4–15.4)
PLATELET # BLD: 208 K/UL (ref 135–450)
PMV BLD AUTO: 8.8 FL (ref 5–10.5)
POTASSIUM SERPL-SCNC: 4 MMOL/L (ref 3.5–5.1)
RBC # BLD: 4.84 M/UL (ref 4–5.2)
RHEUMATOID FACTOR: <10 IU/ML
SEDIMENTATION RATE, ERYTHROCYTE: 3 MM/HR (ref 0–30)
SODIUM BLD-SCNC: 134 MMOL/L (ref 136–145)
TOTAL PROTEIN: 6.7 G/DL (ref 6.4–8.2)
TRIGL SERPL-MCNC: 159 MG/DL (ref 0–150)
TSH SERPL DL<=0.05 MIU/L-ACNC: 0.69 UIU/ML (ref 0.27–4.2)
URIC ACID, SERUM: 2.7 MG/DL (ref 2.6–6)
VLDLC SERPL CALC-MCNC: 32 MG/DL
WBC # BLD: 5.8 K/UL (ref 4–11)

## 2021-06-05 LAB
ANTI-NUCLEAR ANTIBODY (ANA): NEGATIVE
CYCLIC CITRULLINATED PEPTIDE ANTIBODY IGG: <0.5 U/ML (ref 0–2.9)
HIV AG/AB: NORMAL
HIV ANTIGEN: NORMAL
HIV-1 ANTIBODY: NORMAL
HIV-2 AB: NORMAL

## 2021-07-14 ENCOUNTER — OFFICE VISIT (OUTPATIENT)
Dept: DERMATOLOGY | Age: 56
End: 2021-07-14
Payer: COMMERCIAL

## 2021-07-14 ENCOUNTER — TELEPHONE (OUTPATIENT)
Dept: DERMATOLOGY | Age: 56
End: 2021-07-14

## 2021-07-14 VITALS — TEMPERATURE: 97.6 F

## 2021-07-14 DIAGNOSIS — L81.8 IDIOPATHIC GUTTATE HYPOMELANOSIS: ICD-10-CM

## 2021-07-14 DIAGNOSIS — L23.7 ALLERGIC CONTACT DERMATITIS DUE TO PLANT: Primary | ICD-10-CM

## 2021-07-14 DIAGNOSIS — D18.01 CHERRY ANGIOMA: ICD-10-CM

## 2021-07-14 DIAGNOSIS — L82.1 SEBORRHEIC KERATOSIS: ICD-10-CM

## 2021-07-14 DIAGNOSIS — L81.4 SOLAR LENTIGINOSIS: ICD-10-CM

## 2021-07-14 DIAGNOSIS — Z85.820 PERSONAL HISTORY OF MALIGNANT MELANOMA: ICD-10-CM

## 2021-07-14 DIAGNOSIS — D22.9 MULTIPLE BENIGN MELANOCYTIC NEVI: ICD-10-CM

## 2021-07-14 PROCEDURE — 99243 OFF/OP CNSLTJ NEW/EST LOW 30: CPT | Performed by: DERMATOLOGY

## 2021-07-14 RX ORDER — CLOBETASOL PROPIONATE 0.5 MG/G
OINTMENT TOPICAL
Qty: 45 G | Refills: 2 | Status: SHIPPED | OUTPATIENT
Start: 2021-07-14 | End: 2022-05-12

## 2021-07-14 NOTE — LETTER
Dermatology  [address] [place] [address]  Phone: [phone]  Fax: [phone]           Corinne Feliciano MD      July 14, 2021     Patient: Kimo Maguire   MR Number: [id number]   YOB: 1965   Date of Visit: 7/14/2021       Dear Dr. Anni Ford: Thank you for referring Kaylin Batres to me for evaluation/treatment. Below are the relevant portions of my assessment and plan of care. If you have questions, please do not hesitate to call me. I look forward to following Kaylin along with you. Sincerely,        Corinne Feliciano MD    CC providers:   Nevaeh Sexton MD  [address]

## 2021-07-14 NOTE — PATIENT INSTRUCTIONS
Skin Cancer Prevention: After Your Visit    Skin cancer is the abnormal growth of cells in the skin. It usually appears as a growth that changes in color, shape, or size. This can be a sore that does not heal or a change in a wart or a mole. Skin cancer is almost always curable when found early and treated. So it is important to see your doctor if you have any of these changes in your skin. Skin cancer is the most common type of cancer. It often appears on areas of the body that have been exposed to the sun, such as the head, face, neck, back, chest, or shoulders. Follow-up care is a key part of your treatment and safety. Be sure to make and go to all appointments, and call your doctor if you are having problems. It's also a good idea to know your test results and keep a list of the medicines you take. How can you care for yourself at home?  - Wear a wide-brimmed hat and long sleeves and pants if you are going to be outdoors for a long time. - Avoid the sun between 10 a.m. and 4 p.m., which is the peak time for UV rays. - Wear sunscreen on exposed skin. Make sure the sunscreen blocks ultraviolet rays (both UVA and UVB) and has a sun protection factor (SPF) of at least 30. Use it every day, even when it is cloudy. Some doctors may recommend a higher SPF, such as 48.  - Do not use tanning booths or sunlamps. - Use lip balm or cream that has sun protection factor (SPF) to protect your lips from getting sunburned or getting cold sores. - Wear sunglasses that block UV rays. When should you call for help? Watch closely for changes in your health, and be sure to contact your doctor if:  - You are concerned about any problem areas on your skin. - You notice a change in a mole or skin growth. For example:  a. It gets bigger. b. It develops uneven borders. c. It gets thicker, raised, or worn down. d. It changes color. e. It starts to bleed easily. Dr. Narcisa Rivera [facility] [employer] Safe Strategies:    1.  Avoid the sun if possible especially between the hours of 10 am and 4 pm. That's when the sun's most harmful UV rays are hitting the earth. 2. Use protective clothing. Just like the right equipment helps you perform better in your sport, the proper clothing can do a lot to help us in our fight to prevent skin cancer. Thicker and darker clothing with a tighter weave will block more of the suns harmful rays, and it never wears off! Make wearing long sleeves, a broad brimmed hat and sun glasses part of your routine. 3. Use a broad spectrum sunscreen with SPF 30 or higher on all your exposed skin. Make sure the sunscreen has either titanium dioxide, and/or zinc oxide (preferred), or Avobenzone in it (check the active ingredients on the back of the bottle to make sure). If you are going to be in the water or sweating, make sure the sunscreen you choose is water resistant. Better sunscreens are available in [place] ([facility]) and [facility] like Tinosorb S (Bemotrizinol) and Tinosorb M ([address] [place] [address]), so if you are serious about sun protection and visit those areas, feel free to stock up. 4. Reapply the sunscreen after 2 hours or after swimming, sweating, or toweling off. We don't care about the brand, but some good brands to use are Kierra Tesfaye MD , [facility], and really just about anything with the above ingredients. Cetaphil oil control moisturizer with SPF 30 or 50 is a good one for the face. It is not water resistant but goes on really light and won't make you break out. 5. Use enough. One ounce of lotion, which is about the size of a golf ball, is the amount needed to cover the exposed parts of an average adult body. For sprays, apply until it \"glistens\" on skin (2 seconds of spray per arm, 4 seconds per leg, and 5 to 8 seconds each for the front torso and back). For sticks apply 3 to 4 passes back and forth per area. 6. Get regular check ups.  Studies show that those who check their own skin every month, get

## 2021-07-14 NOTE — PROGRESS NOTES
Patient's Name: Leonidas Conte  MRN: [id number]  YOB: 1965  Date of Visit: 7/14/2021  Primary Care Provider: Cleopatra Pham MD  Referring Provider: Yolanda Monroy MD    Subjective:     Chief Complaint   Patient presents with    Skin Exam     rash on right arm,spot check        History of Present Illness:  Kaylin Batres an 61 y.o. female with a h/o malignant melanoma and NMSC who presents in clinic today as a new patient seen in consultation request by Cleopatra Pham MD for a full body skin examination and evaluation of a rash on Rt arm. She is concerned about the following spot/s that she would like checked:    Patient reports developing a rash on her Rt arm. The rash has been itchy. It has been present for a few weeks. Initially it resolved and recurred in the same area. She has been treating it with OTC cortaid and Lotrimin    Past Dermatologic History:  Personal history of non melanoma skin cancer: Yes   Personal history of melanoma: Yes  Personal history of abnormal/dysplastic moles: Yes  Personal history of tanning bed use current: No  Personal history of tanning bed use: Yes  Personal history of blistering sunburns: No  Personal history of extensive sun exposure: No  Burns easily: No  Practicing sun protective habits:  Yes using sunscreen SPF  15-30     Social History:   Occupation Current or Former: full time job as [employer]   Marital status:   Smoking Status: Never smoker  Children: No   Type of outdoor activities if any : walking,sporting events,swimming      Family Skin Disease History:  Patient reports  a family history of non melanoma skin cancer. Patient reports  a family history of abnormal moles  Patient reports  an immediate family history of melanoma.      Past Medical History:  Past Medical History:   Diagnosis Date    Anxiety     Basal cell carcinoma     chest , stomach    DDD (degenerative disc disease), cervical     L4-5; L5-S1    History of melanoma 2000 right thigh     Infertility, female     Lumbar foraminal stenosis     Melanoma ([address]     Menopause ovarian failure     Menorrhagia     Migraine     Ovarian cyst        Past Surgical History:  Past Surgical History:   Procedure Laterality Date    BACK SURGERY  2000    X 4    COLONOSCOPY N/A 8/11/2020    COLONOSCOPY POLYPECTOMY SNARE/COLD BIOPSY performed by Melquiades Roger MD at [facility]  2013   [address] Left     HEMORRHOID SURGERY      internal hemorrhoid banding- Dr. Ting Bateman in early 26ths       Past Family History:  Family History   Problem Relation Age of Onset    Arthritis Mother         back    Other Mother         balance    Diabetes type 2  Father 72    Coronary Art Dis Father 72    Hypertension Father     Arthritis Father     Colon Cancer Maternal Grandmother     Cancer Brother         melanoma    Breast Cancer Maternal Aunt     Breast Cancer Maternal Cousin        Allergies: Allergies   Allergen Reactions    Iodine Hives    Other      XRAY DYE       Current Medications:  Current Outpatient Medications   Medication Sig Dispense Refill    butalbital-acetaminophen-caffeine (FIORICET) -40 MG per tablet Take 1 tablet by mouth every 4 hours as needed for Headaches 30 tablet 1    escitalopram (LEXAPRO) 10 MG tablet Take 1 tablet by mouth daily 90 tablet 2    ipratropium (ATROVENT) 0.06 % nasal spray 2 sprays by Nasal route 2 times daily 3 Bottle 3    estradiol-norethindrone (ACTIVELLA) 1-0.5 MG per tablet TAKE 1 TABLET BY MOUTH ONE TIME A DAY 90 tablet 2     No current facility-administered medications for this visit. Review of Systems:  Constitutional: No fevers, chills or recent illness.    Skin: Skin:As per HPI AND otherwise no new, bleeding or symptomatic skin lesions      Objective:     Vitals:    07/14/21 1103   Temp: 97.6 °F (36.4 °C)   TempSrc: Infrared     Physical Examination:  General: 2-10 mm diameter. The lentigines seen today are benign in character, caused by the sun, and do not require treatment. However, they do occasionally transform into a malignancy, so the patient needs to monitor for changes. They were educated on what a suspicious change would include, change in size or color, and included education on the ABCDs of melanoma. These lesions may be treated with lasers for cosmetic purposes      5. Le Hemangiomas  Location: arms, upper chest, breast, abdomen, back, lateral torso, legs and buttocks. Objective findings: Multiple bright red, dome-shaped papules     Discussed that these are benign lesions and require no treatment. 6. Multiple benign melanocytic nevi   Location: abdomen and back  Objective findings: multiple brown/pink macules and papules without abnormal findings or concerning findings on exam and dermatoscopy    -Counseled the patient that these are benign and need no therapy. Observation for any changes recommended     7. History of malignant melanoma  Location:  Rt lateral thigh  Objective findings: scar without signs of recurrence, healing and remodeling well    Discussed Risk factors for conditions such as nevi, skin cancers, photo-aging and skin damage including genetics and UV radiation from the sun and tanning beds. Recommendation was made for sun avoidance, use of protective clothing and daily use of broad spectrum sunscreen containing avobenzone, titanium, or zinc with SPF 50 or higher and yearly full skin exams with a dermatologist.  Also instructed to do self skin checks each month, and return to clinic sooner than a year if any new, changing, or concerning spots are identified.    - Written material on sunscreen provided. * Rt upper back with firm 3 mm erythematous papule. Patient believes it's new and possibly a bug bite.  Patient was instructed to monitor lesion and if not resolved to keep her F/U appointment to re-evaluate lesion with concern for [address]. *    Follow up:  Return visit in 4-6 weeks to follow up on lesion or as needed for change in condition. All questions addressed. Procedure:   No procedure performed          I saw your patient Kaylin Batres at the date listed above in my Dermatology  clinic in [facility]. Thank you very much for the referral.    My exam findings, assessment and plan can be found in EPIC, I have also attached them below for your convenience. I very much appreciate your referral and the opportunity to participate in this patient's care. Please don't hesitate to contact me with questions or concerns about our visit or management of this patient in the future.      Filomena Joy MD, MS

## 2021-07-15 LAB
CHOLESTEROL, TOTAL: 229 MG/DL (ref 0–199)
GLUCOSE BLD-MCNC: 87 MG/DL (ref 70–99)
HDLC SERPL-MCNC: 52 MG/DL (ref 40–60)
LDL CHOLESTEROL CALCULATED: 158 MG/DL
TRIGL SERPL-MCNC: 95 MG/DL (ref 0–150)

## 2021-07-16 ENCOUNTER — PATIENT MESSAGE (OUTPATIENT)
Dept: FAMILY MEDICINE CLINIC | Age: 56
End: 2021-07-16

## 2021-07-28 ENCOUNTER — OFFICE VISIT (OUTPATIENT)
Dept: FAMILY MEDICINE CLINIC | Age: 56
End: 2021-07-28
Payer: COMMERCIAL

## 2021-07-28 VITALS
WEIGHT: 145.4 LBS | RESPIRATION RATE: 14 BRPM | HEART RATE: 70 BPM | TEMPERATURE: 96.9 F | OXYGEN SATURATION: 98 % | BODY MASS INDEX: 21.78 KG/M2 | SYSTOLIC BLOOD PRESSURE: 122 MMHG | DIASTOLIC BLOOD PRESSURE: 80 MMHG

## 2021-07-28 DIAGNOSIS — M79.645 PAIN OF LEFT MIDDLE FINGER: ICD-10-CM

## 2021-07-28 DIAGNOSIS — M21.611 BUNION OF GREAT TOE OF RIGHT FOOT: Primary | ICD-10-CM

## 2021-07-28 DIAGNOSIS — E78.00 HYPERCHOLESTEREMIA: ICD-10-CM

## 2021-07-28 PROCEDURE — 99214 OFFICE O/P EST MOD 30 MIN: CPT | Performed by: FAMILY MEDICINE

## 2021-07-28 NOTE — PROGRESS NOTES
Patient is here for follow up of elevated cholesterol. She had left hand third finger after injury with dog pulling chain. Initial injury was late last summer. Right handed. Still swollen. No prior Xrays . Right foot pain X 1 year. She is unaware of injury. Swollen to base of great toe. No prior xray. Certain aggravates and footwear flare the pain. Review of Systems    ROS: All other systems were reviewed and are negative . Patient's allergies and medications were reviewed. Patient's past medical, surgical, social , and family history were reviewed. OBJECTIVE:  /80   Pulse 70   Temp 96.9 °F (36.1 °C)   Resp 14   Wt 145 lb 6.4 oz (66 kg)   SpO2 98%   BMI 21.78 kg/m²     Physical Exam    General: NAD, cooperative, alert and oriented X 3. Mood / affect is good. good insight. well hydrated. Neck : no lymphadenopathy, supple, FROM  Upper extremities : DTRs 2+ biceps/ triceps/ brachioradialis bilateral.  FROM. Strength 5/5. Left hand: 3rd digit with edema and tenderness. FROM. Strength 5/5. CV: Regular rate and rhythm , no murmurs/ rub/ gallop. No edema. Lungs : CTA bilaterally, breathing comfortably  Abdomen: positive bowel sounds, soft , non tender, non distended. No hepatosplenomegaly. No CVA tenderness. Right foot: great toe bunion - non tender. No edema or erythema. Skin: no rashes. Non tender. ASSESSMENT/  PLAN:  1. Bunion of great toe of right foot  - recommended good supportive footwear. - XR FOOT RIGHT (MIN 3 VIEWS); Future and follow up after completed/ prn.     2. Pain of left middle finger  - Aleve 1-2 po bid prn.   - Modify activities. - XR FINGER LEFT (MIN 2 VIEWS); Future and follow up after completed/ prn.   - hold on Hand specialist referral given no trigger and normal strength. 4. Hypercholesteremia  - Reviewed Be Well Within labs. - Follow a low cholesterol diet, including cardiovascular exercise > 150 minutes/ week.         Follow up if no improvement in 4-6weeks/ as needed for increased symptoms.

## 2021-08-10 DIAGNOSIS — G43.009 MIGRAINE WITHOUT AURA AND WITHOUT STATUS MIGRAINOSUS, NOT INTRACTABLE: ICD-10-CM

## 2021-08-10 RX ORDER — BUTALBITAL, ACETAMINOPHEN AND CAFFEINE 50; 325; 40 MG/1; MG/1; MG/1
TABLET ORAL
Qty: 30 TABLET | Refills: 1 | Status: SHIPPED | OUTPATIENT
Start: 2021-08-10 | End: 2022-05-10 | Stop reason: SDUPTHER

## 2021-08-10 NOTE — TELEPHONE ENCOUNTER
Requested Prescriptions     Pending Prescriptions Disp Refills    butalbital-acetaminophen-caffeine (FIORICET, ESGIC) -40 MG per tablet [Pharmacy Med Name: BUTALBITAL-APAP-CAFF -40 TABS] 30 tablet 1     Sig: TAKE ONE TABLET BY MOUTH EVERY 4 HOURS AS NEEDED FOR HEADACHE     Last ov 7/28/21  Last labs 6/4/21

## 2021-08-11 ENCOUNTER — HOSPITAL ENCOUNTER (OUTPATIENT)
Dept: GENERAL RADIOLOGY | Age: 56
Discharge: HOME | End: 2021-08-11
Payer: COMMERCIAL

## 2021-08-11 ENCOUNTER — HOSPITAL ENCOUNTER (OUTPATIENT)
Age: 56
Discharge: HOME | End: 2021-08-11
Payer: COMMERCIAL

## 2021-08-11 DIAGNOSIS — M21.611 BUNION OF GREAT TOE OF RIGHT FOOT: ICD-10-CM

## 2021-08-11 DIAGNOSIS — M79.645 PAIN OF LEFT MIDDLE FINGER: ICD-10-CM

## 2021-08-11 PROCEDURE — 73630 X-RAY EXAM OF FOOT: CPT

## 2021-08-11 PROCEDURE — 73140 X-RAY EXAM OF FINGER(S): CPT

## 2021-08-16 ENCOUNTER — TELEPHONE (OUTPATIENT)
Dept: DERMATOLOGY | Age: 56
End: 2021-08-16

## 2021-08-16 NOTE — TELEPHONE ENCOUNTER
Left voicemail for patient to return call. Patient has an appointment scheduled on  08/17/2021 at 2pm. Would like for patient to come at 12pm per Dr. Javy stroud the change appointment time when patient returns call.

## 2021-08-17 ENCOUNTER — OFFICE VISIT (OUTPATIENT)
Dept: DERMATOLOGY | Age: 56
End: 2021-08-17
Payer: COMMERCIAL

## 2021-08-17 VITALS — TEMPERATURE: 97.4 F

## 2021-08-17 DIAGNOSIS — D18.01 CHERRY ANGIOMA: ICD-10-CM

## 2021-08-17 DIAGNOSIS — D48.5 NEOPLASM OF UNCERTAIN BEHAVIOR OF SKIN: Primary | ICD-10-CM

## 2021-08-17 PROCEDURE — 11102 TANGNTL BX SKIN SINGLE LES: CPT | Performed by: DERMATOLOGY

## 2021-08-17 PROCEDURE — 99213 OFFICE O/P EST LOW 20 MIN: CPT | Performed by: DERMATOLOGY

## 2021-08-17 NOTE — PATIENT INSTRUCTIONS
Wound care instructions    1. Keep bandage dry for 24 hrs. 2. After 24 hrs, clean with soap and water twice daily  3. Apply Vaseline or Aquaphor ointment twice daily and cover with small dressing or Band-Aid for 7-10 days. Studies show that wounds heal better when covered with ointment and a bandage. FREQUENTLY ASKED QUESTIONS:    Diana Calixto It is OK to get the wound wet when washing or bathing.  If bleeding should occur, apply firm direct pressure for 20 minutes CONTINUOUSLY. After 20 minutes, you may check to see if bleeding has stopped. If bleeding persists, please repeat CONTINUOUS PRESSURE for another 20 minutes WITHOUT LOOKING. o If bleeding still persists, call the office at [phone]   Signs of infection include increased redness extending over 1 centimeter from the wound, increased warmth, yellow to green purulent (pus-like) discharge, and significantly increased tenderness to the touch. If you have any concerns regarding infection or develop fevers or chills, please call [phone]   You will be contacted with the results of your procedure when we receive them, usually within two weeks. Please call if you have not heard from us.

## 2021-08-18 NOTE — PROGRESS NOTES
Patient's Name: Demetrice Sheridan  MRN: [id number]  YOB: 1965  Date of Visit: 8/17/2021  Primary Care Provider: Akila Law MD  Referring Provider: No ref. provider found    Subjective:     Chief Complaint   Patient presents with    Follow-up     4-5 week f/u on lesion on upper back and rt. arm didn't feel the colbetasol helped        History of Present Illness:  Kaylin Batres a 61 y.o. female is a follow up patient to my practice. They were last seen in clinic on 7/14/21    At her last visit, patient had a lesion on the Rt upper back that was to be re-evaluated. She is unsure if the lesion is still present or not. She report the bumps on her Rt arm have not resolved. She has been applying clobetasol twice daily as needed with no improvement. She does however report the clobetasol helped with the red itchy patch on her Rt arm. She had a lesion on her torso that she wants to make sure it was seen at her last visit and not new. Denies any pain, itching or bleeding associated with the lesion. Past medical/surgical/family history reviewed with no changes since last visit on 7/14/21        Allergies: Allergies   Allergen Reactions    Iodine Hives    Other      XRAY DYE       Current Medications:  Current Outpatient Medications   Medication Sig Dispense Refill    butalbital-acetaminophen-caffeine (FIORICET, ESGIC) -40 MG per tablet TAKE ONE TABLET BY MOUTH EVERY 4 HOURS AS NEEDED FOR HEADACHE 30 tablet 1    clobetasol (TEMOVATE) 0.05 % ointment Apply to affected right arm area, twice daily for 1-2 weeks then as needed for flares.  45 g 2    escitalopram (LEXAPRO) 10 MG tablet Take 1 tablet by mouth daily 90 tablet 2    ipratropium (ATROVENT) 0.06 % nasal spray 2 sprays by Nasal route 2 times daily 3 Bottle 3    estradiol-norethindrone (ACTIVELLA) 1-0.5 MG per tablet TAKE 1 TABLET BY MOUTH ONE TIME A DAY 90 tablet 2     No current facility-administered medications for this visit. Review of Systems:  Constitutional: No fevers, chills or recent illness. Skin: Skin:As per HPI AND otherwise no new, bleeding or symptomatic skin lesions      Objective:     Vitals:    08/17/21 1409   Temp: 97.4 °F (36.3 °C)       Physical Examination:  General: alert, comfortable, no apparent distress, well-appearing  Psych: alert, oriented and pleasant  Neuro: oriented to person, place, and time  Skin: Areas examined: head including face, lips, conjunctiva and lids, neck, hair/scalp, left upper extremity, left hand, right hand and digits and nails      All areas examined were within normal limits except those listed below with the appropriate assessment and plan    Assessment and Plan (with relevant objective exam findings):     1. Neoplasm uncertain behavior, skin  Location:Rt antecubital fossa  Objective findings: 1 mm firm white/yellow papule with surrounding erythema  Ddx: Molluscum with molluscum dermatitis vs milia vs foliculitis  Biopsy today. See procedure note below. The specimen was sent to pathology for diagnosis. We will call patient or send note on Mychart with biopsy results as soon as they are available, and discuss treatment options as needed. Wound care was discussed and written instructions also given to patient. 2. Le Hemangiomas  Location:Lt lateral torso/axillary vault    Objective findings:  bright red, dome-shaped papule     Discussed that this is a benign lesion and require no treatment. *Lesion on the back resolved*    Follow up:  Return visit pending biopsy results/ or as needed for change in condition. All questions addressed. Procedure:   Procedure:  (BIOPSY BY SHAVE TECHNIQUE) (16820)  Location:  Rt antecubital fossa  Indication: Neoplasm uncertain behavior, skin      Risks, benefits, and alternatives were explained and verbal informed consent was obtained. The area was photographed with patient's permission.  The area was cleaned with

## 2021-08-20 LAB — DERMATOLOGY PATHOLOGY REPORT: NORMAL

## 2021-08-31 ENCOUNTER — OFFICE VISIT (OUTPATIENT)
Dept: ORTHOPEDIC SURGERY | Age: 56
End: 2021-08-31
Payer: COMMERCIAL

## 2021-08-31 ENCOUNTER — PATIENT MESSAGE (OUTPATIENT)
Dept: FAMILY MEDICINE CLINIC | Age: 56
End: 2021-08-31

## 2021-08-31 VITALS — WEIGHT: 145 LBS | BODY MASS INDEX: 21.48 KG/M2 | HEIGHT: 69 IN

## 2021-08-31 DIAGNOSIS — S46.812A STRAIN OF LEFT TRAPEZIUS MUSCLE, INITIAL ENCOUNTER: ICD-10-CM

## 2021-08-31 DIAGNOSIS — M20.21 HALLUX RIGIDUS OF RIGHT FOOT: ICD-10-CM

## 2021-08-31 DIAGNOSIS — M79.671 RIGHT FOOT PAIN: Primary | ICD-10-CM

## 2021-08-31 PROCEDURE — 99243 OFF/OP CNSLTJ NEW/EST LOW 30: CPT | Performed by: ORTHOPAEDIC SURGERY

## 2021-08-31 RX ORDER — IBUPROFEN 200 MG
200 TABLET ORAL EVERY 6 HOURS PRN
COMMUNITY
End: 2022-05-12

## 2021-08-31 RX ORDER — CYCLOBENZAPRINE HCL 10 MG
10 TABLET ORAL 3 TIMES DAILY PRN
Qty: 30 TABLET | Refills: 0 | Status: SHIPPED | OUTPATIENT
Start: 2021-08-31 | End: 2021-11-05 | Stop reason: SDUPTHER

## 2021-08-31 NOTE — PROGRESS NOTES
CHIEF COMPLAINT: Right great toe pain/ Hallux rigidus. HISTORY:  Ms. Mariel Cyr 61 y.o.  female presents today for consultation request from Nevaeh Sexton MD for evaluation of right great toe pain which started to worsen gradually over the past year. She states she has been working from home but recently wore high-heeled shoes causing her right great toe pain to worsen.  She is complaining of achy pain. Pain is increase with standing and walking and shoe wear. She rates her pain a 4/10 VAS. Pain is sharp early in the morning with first few steps, dull achy pain by the end of the day. No radiation and no numbness and tingling sensation. Treatment to date has consisted of over-the-counter NSAIDs with not much improvement. No other complaint. No h/o trauma or gout. She has a history of a left foot fifth toe proximal phalanx fracture, ORIF with 2 screws by [facility] a few years ago. Denies smoking. She works at [email or web address] in [employer].     Past Medical History:   Diagnosis Date    Anxiety     Basal cell carcinoma     chest , stomach    DDD (degenerative disc disease), cervical     L4-5; L5-S1    History of melanoma 2000    right thigh     Infertility, female     Lumbar foraminal stenosis     Melanoma ([address]     Menopause ovarian failure     Menorrhagia     Migraine     Ovarian cyst        Past Surgical History:   Procedure Laterality Date    BACK SURGERY  2000    X 4    COLONOSCOPY N/A 8/11/2020    COLONOSCOPY POLYPECTOMY SNARE/COLD BIOPSY performed by Milena Zazueta MD at [address] [place] [address]  2013   [address] Left     HEMORRHOID SURGERY      internal hemorrhoid banding- Dr. Forrest Paredes in early 26ths       Social History     Socioeconomic History    Marital status: Single     Spouse name: Not on file    Number of children: Not on file    Years of education: Not on file    Highest education level: Not on file Occupational History    Not on file   Tobacco Use    Smoking status: Never Smoker    Smokeless tobacco: Never Used   Vaping Use    Vaping Use: Never used   Substance and Sexual Activity    Alcohol use: Yes     Comment: socially    Drug use: Not Currently     Types: Marijuana    Sexual activity: Not Currently   Other Topics Concern    Not on file   Social History Narrative    Not on file     Social Determinants of Health     Financial Resource Strain: Low Risk     Difficulty of Paying Living Expenses: Not hard at all   Food Insecurity: No Food Insecurity    Worried About Running Out of Food in the Last Year: Never true    Maureen of Food in the Last Year: Never true   Transportation Needs:     Lack of Transportation (Medical):      Lack of Transportation (Non-Medical):    Physical Activity:     Days of Exercise per Week:     Minutes of Exercise per Session:    Stress:     Feeling of Stress :    Social Connections:     Frequency of Communication with Friends and Family:     Frequency of Social Gatherings with Friends and Family:     Attends Muslim Services:     Active Member of Clubs or Organizations:     Attends Club or Organization Meetings:     Marital Status:    Intimate Partner Violence:     Fear of Current or Ex-Partner:     Emotionally Abused:     Physically Abused:     Sexually Abused:        Family History   Problem Relation Age of Onset    Arthritis Mother         back    Other Mother         balance    Diabetes type 2  Father 72    Coronary Art Dis Father 72    Hypertension Father     Arthritis Father     Colon Cancer Maternal Grandmother     Cancer Brother         melanoma    Breast Cancer Maternal Aunt     Breast Cancer Maternal Cousin        Current Outpatient Medications on File Prior to Visit   Medication Sig Dispense Refill    ibuprofen (ADVIL;MOTRIN) 200 MG tablet Take 200 mg by mouth every 6 hours as needed for Pain      escitalopram (LEXAPRO) 10 MG tablet Take 1 tablet by mouth daily 90 tablet 2    estradiol-norethindrone (ACTIVELLA) 1-0.5 MG per tablet TAKE 1 TABLET BY MOUTH ONE TIME A DAY 90 tablet 2    butalbital-acetaminophen-caffeine (FIORICET, ESGIC) -40 MG per tablet TAKE ONE TABLET BY MOUTH EVERY 4 HOURS AS NEEDED FOR HEADACHE (Patient not taking: Reported on 8/31/2021) 30 tablet 1    clobetasol (TEMOVATE) 0.05 % ointment Apply to affected right arm area, twice daily for 1-2 weeks then as needed for flares. (Patient not taking: Reported on 8/31/2021) 45 g 2    ipratropium (ATROVENT) 0.06 % nasal spray 2 sprays by Nasal route 2 times daily (Patient not taking: Reported on 8/31/2021) 3 Bottle 3     No current facility-administered medications on file prior to visit. Pertinent items are noted in HPI  Review of systems reviewed from Patient History Form dated on 8/31/2021 and available in the patient's chart under the Media tab. No change noted. PHYSICAL EXAMINATION:  Ms. Yvette Villalobos is a very pleasant 61 y.o.  female who presents today in no acute distress, awake, alert, and oriented. She is well dressed, nourished and  groomed. Patient with normal affect. Height is  5' 8.5\" (1.74 m), weight is 145 lb (65.8 kg), Body mass index is 21.73 kg/m². Resting respiratory rate is 16. Examination of the gait, showed that the patient walks heel-toe with a non-antalgic gait and no limp.  Examination of both ankles showing a good range of motion.  She has ankle dorsiflexion to about 10 degrees bilaterally, which increased with knee flexion. She has decreased dorsiflexion of the right great toe compared to the other side. She has intact sensation and good pedal pulses.  She has good strength in all four planes, including eversion, and has moderate tenderness on deep palpation over the right great toe MPJ, with prominent dorsal osteophytes.  The ankles are stable to drawer test bilaterally, equally.  Ankle reflex 1+ bilaterally.

## 2021-08-31 NOTE — TELEPHONE ENCOUNTER
From: Kaylin Batres  To: Melody Rice MD  Sent: 8/31/2021 9:44 AM EDT  Subject: Prescription Question    I'd like a refill of my muscle relaxer or another muscle relaxer if you think it would be more effective.  Thank you

## 2021-08-31 NOTE — LETTER
[facility]  Surgery Scheduling Form:    DEMOGRAPHICS:                                                                                                              .  Patient Name:  Bennett Cea  Patient :  1965   Patient SS#:      Patient Phone:  [phone] (home)  Alt. Patient Phone:    Patient Address:  [address] [place] [address]  PCP:  Gabriela Claros MD  Insurance:   Payor/Plan Subscr  Sex Relation Sub. Ins. ID Effective Group Num   1. MEDICAL MUTUA* ELVIE ARGUELLESBE* 1965 Female Self [id number]                                   [address]     Insurance ID Number:    DIAGNOSIS & PROCEDURE:                                                                                            .    Diagnosis:   Right foot Hallux rigidus M20.21  Operation:  Right great toe cheilectomy  [id number]  Location:  [facility]  Surgeon:  Zenon Amor MD    SCHEDULING INFORMATION:                                                                                         .    Surgeon's Scheduling Instruction:  2021  Requested Date:  2021  OR Time:  0730 Patient Arrival Time:  0600  OR Time Required:  30  Minutes  Anesthesia:  General    SA Required:  Yes  Equipment:  n/a  Mini C-Arm:  No    Standard C-Arm:  Yes  Status:  Outpatient    PAT Required:  Yes  Latex Allergy:  unknown    Defibrilator or Pacemaker:  unknown  Isolation Precautions:  unknown  Comments: aware to get covid test                      Criss Moe MD 21   BILLING INFORMATION:                                                                                                    .    Procedure:       CPT Code Modifier                  Pre-Certification:

## 2021-09-07 ENCOUNTER — TELEPHONE (OUTPATIENT)
Dept: ORTHOPEDIC SURGERY | Age: 56
End: 2021-09-07

## 2021-09-07 NOTE — TELEPHONE ENCOUNTER
Surgery and/or Procedure Scheduling     Contact Name: Joe Ramos  Surgical/Procedure Request:   Patient Contact Number: [phone]       Patient would like to reschedule her surgery for November.

## 2021-09-13 ENCOUNTER — TELEPHONE (OUTPATIENT)
Dept: ORTHOPEDIC SURGERY | Age: 56
End: 2021-09-13

## 2021-09-13 NOTE — TELEPHONE ENCOUNTER
Patient would like to schedule surgery November 4 2021 instead of the November 18, 2021. Please call patient.

## 2021-09-20 ENCOUNTER — HOSPITAL ENCOUNTER (OUTPATIENT)
Dept: WOMENS IMAGING | Age: 56
Discharge: HOME | End: 2021-09-20
Payer: COMMERCIAL

## 2021-09-20 DIAGNOSIS — Z12.31 VISIT FOR SCREENING MAMMOGRAM: ICD-10-CM

## 2021-09-20 PROCEDURE — 77063 BREAST TOMOSYNTHESIS BI: CPT

## 2021-09-23 NOTE — TELEPHONE ENCOUNTER
Left message for patient to call the office.   We can re-schedule her for [facility]

## 2021-10-08 ENCOUNTER — OFFICE VISIT (OUTPATIENT)
Dept: FAMILY MEDICINE CLINIC | Age: 56
End: 2021-10-08
Payer: COMMERCIAL

## 2021-10-08 VITALS
WEIGHT: 145 LBS | SYSTOLIC BLOOD PRESSURE: 118 MMHG | HEIGHT: 69 IN | DIASTOLIC BLOOD PRESSURE: 80 MMHG | RESPIRATION RATE: 12 BRPM | HEART RATE: 80 BPM | TEMPERATURE: 97.3 F | OXYGEN SATURATION: 100 % | BODY MASS INDEX: 21.48 KG/M2

## 2021-10-08 DIAGNOSIS — F41.9 ANXIETY: ICD-10-CM

## 2021-10-08 DIAGNOSIS — G47.9 SLEEP DIFFICULTIES: ICD-10-CM

## 2021-10-08 DIAGNOSIS — Z01.818 PREOP EXAMINATION: Primary | ICD-10-CM

## 2021-10-08 DIAGNOSIS — M79.674 GREAT TOE PAIN, RIGHT: ICD-10-CM

## 2021-10-08 DIAGNOSIS — Z23 NEEDS FLU SHOT: ICD-10-CM

## 2021-10-08 DIAGNOSIS — M48.061 LUMBAR FORAMINAL STENOSIS: ICD-10-CM

## 2021-10-08 PROCEDURE — 99243 OFF/OP CNSLTJ NEW/EST LOW 30: CPT | Performed by: FAMILY MEDICINE

## 2021-10-08 PROCEDURE — 90674 CCIIV4 VAC NO PRSV 0.5 ML IM: CPT | Performed by: FAMILY MEDICINE

## 2021-10-08 PROCEDURE — 90471 IMMUNIZATION ADMIN: CPT | Performed by: FAMILY MEDICINE

## 2021-10-08 PROCEDURE — 93000 ELECTROCARDIOGRAM COMPLETE: CPT | Performed by: FAMILY MEDICINE

## 2021-10-08 RX ORDER — HYDROXYZINE HYDROCHLORIDE 25 MG/1
25 TABLET, FILM COATED ORAL EVERY 6 HOURS PRN
Qty: 45 TABLET | Refills: 0 | Status: SHIPPED | OUTPATIENT
Start: 2021-10-08 | End: 2022-05-12 | Stop reason: SDUPTHER

## 2021-10-08 NOTE — PROGRESS NOTES
Chief Complaint:     Fatimah Mendoza is a 61 y.o. female who presents for a preoperative physical examination. She is scheduled to have right great toe surgery / cheilectomy done by Dr. Ferny Chacon at [facility] on 11/4/21. History of Present Illness:      Patient with right great toe pain . She has h/o RBBB. She has problems falling asleep and staying asleep. She drinks 1 coffee and 1 Diet [facility]. No alcohol. No naps. Patient denies chest pain, palpitations, or shortness of breath . No personal or family history of bleeding, clotting, or anesthesia problems.      Past Medical History:   Diagnosis Date    Anxiety     Basal cell carcinoma     chest , stomach    DDD (degenerative disc disease), cervical     L4-5; L5-S1    History of melanoma 2000    right thigh     Infertility, female     Lumbar foraminal stenosis     [address] ([facility] [address]     Menopause ovarian failure     Menorrhagia     Migraine     Ovarian cyst         Review of patient's past surgical history indicates:     Past Surgical History:   Procedure Laterality Date    BACK SURGERY  2000    X 4    BREAST ENHANCEMENT SURGERY      COLONOSCOPY N/A 8/11/2020    COLONOSCOPY POLYPECTOMY SNARE/COLD BIOPSY performed by Du Coronel MD at [address] [place] [address]  2013    FOOT SURGERY Left     HEMORRHOID SURGERY      internal hemorrhoid banding- Dr. Pack [place] in early 26ths                                                   Current Outpatient Medications   Medication Sig Dispense Refill    ibuprofen (ADVIL;MOTRIN) 200 MG tablet Take 200 mg by mouth every 6 hours as needed for Pain      butalbital-acetaminophen-caffeine (FIORICET, ESGIC) -40 MG per tablet TAKE ONE TABLET BY MOUTH EVERY 4 HOURS AS NEEDED FOR HEADACHE 30 tablet 1    escitalopram (LEXAPRO) 10 MG tablet Take 1 tablet by mouth daily 90 tablet 2    ipratropium (ATROVENT) 0.06 % nasal spray 2 sprays by Nasal route 2 times daily 3 Bottle 3    estradiol-norethindrone (ACTIVELLA) 1-0.5 MG per tablet TAKE 1 TABLET BY MOUTH ONE TIME A DAY 90 tablet 2    clobetasol (TEMOVATE) 0.05 % ointment Apply to affected right arm area, twice daily for 1-2 weeks then as needed for flares. (Patient not taking: Reported on 10/8/2021) 45 g 2     No current facility-administered medications for this visit. Allergies   Allergen Reactions    Iodine Hives    Other      XRAY DYE       Social History     Tobacco Use    Smoking status: Never Smoker    Smokeless tobacco: Never Used   Vaping Use    Vaping Use: Never used   Substance Use Topics    Alcohol use: Yes     Comment: socially    Drug use: Not Currently     Types: Marijuana        Family History   Problem Relation Age of Onset    Arthritis Mother         back    Other Mother         balance    Diabetes type 2  Father 72    Coronary Art Dis Father 72    Hypertension Father     Arthritis Father     Colon Cancer Maternal Grandmother     Cancer Brother         melanoma    Breast Cancer Maternal Aunt     Breast Cancer Maternal Cousin         Review Of Systems    Skin: no abnormal pigmentation, rash, scaling, itching, masses, hair or nail changes  Eyes: negative  Ears/Nose/Throat: negative  Respiratory: negative  Cardiovascular: negative  Gastrointestinal: negative  Genitourinary: negative  Musculoskeletal: negative  Neurologic: negative  Psychiatric: negative  Hematologic/Lymphatic/Immunologic: negative  Endocrine: negative       Objective:      /80   Pulse 80   Temp 97.3 °F (36.3 °C)   Resp 12   Ht 5' 8.5\" (1.74 m)   Wt 145 lb (65.8 kg)   SpO2 100%   BMI 21.72 kg/m²   General appearance - healthy, alert, no distress  Skin - Skin color, texture, turgor normal. No rashes or lesions. Head - Normocephalic. No masses, lesions, tenderness or abnormalities  Eyes - conjunctivae/corneas clear. PERRLA, EOM's intact. Ears - External ears normal. Canals clear. TM's normal.  Nose/Sinuses - Nares normal. Septum midline. Mucosa normal. No drainage or sinus tenderness. Oropharynx - Lips, mucosa, and tongue normal. Teeth and gums normal. Oropharynx  clear  Neck - Neck supple. No adenopathy. Thyroid symmetric, normal size,  Back - Back symmetric, no curvature. ROM normal. No CVA tenderness. Lungs - Percussion normal. Good diaphragmatic excursion. Lungs clear  Heart - Regular rate and rhythm, with no rub, murmur or gallop noted. No edema. Abdomen - Abdomen soft, non-tender. BS normal. No masses, organomegaly  Extremities - Extremities normal. No deformities, edema, or skin discoloration. Musculoskeletal - Spine ROM normal. Muscular strength intact. Peripheral pulses - radial=4/4,, femoral=4/4, popliteal=4/4, dorsalis pedis=4/4,  Neuro - Gait normal. Reflexes normal and symmetric. Sensation grossly normal.  No focal weakness    EKG: normal EKG, normal sinus rhythm, RBBB. Assessment:        Per encounter diagnoses  She is medically cleared for surgery and anesthesia. Avoid Aspirin, non steroidal anti inflammatory medications, including Motrin, Aleve, Ibuprofen, Advil; multi vitamins, Vitamin E, omega 3 fish oil, and glucosamine chondroitin for the 7 days prior to surgery. 1. Preop examination  - Scheduled for surgery. - EKG 12 Lead    2. Great toe pain, right  - Medically cleared. 3. Anxiety  - Trial of HydrOXYzine (ATARAX) 25 MG tablet; Take 1 tablet by mouth every 6 hours as needed for Anxiety  Dispense: 45 tablet; Refill: 0    4. Sleep difficulties  - Sleep hygiene recommended. - Trial of HydrOXYzine (ATARAX) 25 MG tablet; Take 1 tablet by mouth every 6 hours as needed for Anxiety  Dispense: 45 tablet; Refill: 0    5. Lumbar foraminal stenosis  - Stable. ROM exercises. Modify activities.      6. Needs flu shot  - INFLUENZA, MDCK QUADV, 2 YRS AND OLDER, IM, PF, PREFILL SYR OR SDV, 0.5ML (FLUCELVAX QUADV, PF)            Plan:          Per operating surgeon. See also orders filed with this encounter, if any. Follow up 3 months/ prn.

## 2021-11-02 NOTE — PROGRESS NOTES
Name_______________________________________Printed:____________________  Date and time of surgery__11/4/2021____0700__________________Arrival Time:__0600  masc______________   1. The instructions given regarding when and if a patient needs to stop oral intake prior to surgery varies. Follow the specific instructions you were given                  _x__Nothing to eat or to drink after Midnight the night before.                   ____Carbo loading or ERAS instructions will be given to select patients-if you have been given those instructions -please do the following                           The evening before your surgery after dinner before midnight drink 40 ounces of gatorade. If you are diabetic use sugar free. The morning of surgery drink 40 ounces of water. This needs to be finished 3 hours prior to your surgery start time. 2. Take the following pills with a small sip of water on the morning of surgery__none_________________________________________________                  Do not take blood pressure medications ending in pril or sartan the brant prior to surgery or the morning of surgery_   3. Aspirin, Ibuprofen, Advil, Naproxen, Vitamin E and other Anti-inflammatory products and supplements should be stopped for 5 -7days before surgery or as directed by your physician. 4. Check with your Doctor regarding stopping Plavix, Coumadin,Eliquis, Lovenox,Effient,Pradaxa,Xarelto, Fragmin or other blood thinners and follow their instructions. 5. Do not smoke, and do not drink any alcoholic beverages 24 hours prior to surgery. This includes NA Beer. Refrain from the usage of any recreational drugs. 6. You may brush your teeth and gargle the morning of surgery. DO NOT SWALLOW WATER   7. You MUST make arrangements for a responsible adult to stay on site while you are here and take you home after your surgery. You will not be allowed to leave alone or drive yourself home.   It is strongly suggested someone stay with you the first 24 hrs. Your surgery will be cancelled if you do not have a ride home. 8. A parent/legal guardian must accompany a child scheduled for surgery and plan to stay at the hospital until the child is discharged. Please do not bring other children with you. 9. Please wear simple, loose fitting clothing to the hospital.  Loreto Wilson not bring valuables (money, credit cards, checkbooks, etc.) Do not wear any makeup (including no eye makeup) or nail polish on your fingers or toes. 10. DO NOT wear any jewelry or piercings on day of surgery. All body piercing jewelry must be removed. 11. If you have ___dentures, they will be removed before going to the OR; we will provide you a container. If you wear ___contact lenses or ___glasses, they will be removed; please bring a case for them. 12. Please see your family doctor/pediatrician for a history & physical and/or concerning medications. Bring any test results/reports from your physician's office. PCP__________________Phone___________H&P Appt. Date________             13 If you  have a Living Will and Durable Power of  for Healthcare, please bring in a copy. 15. Notify your Surgeon if you develop any illness between now and surgery  time, cough, cold, fever, sore throat, nausea, vomiting, etc.  Please notify your surgeon if you experience dizziness, shortness of breath or blurred vision between now & the time of your surgery             15. DO NOT shave your operative site 96 hours prior to surgery. For face & neck surgery, men may use an electric razor 48 hours prior to surgery. 16. Shower the night before or morning of surgery using an antibacterial soap or as you have been instructed. 17. To provide excellent care visitors will be limited to one in the room at any given time. 18.  Please bring picture ID and insurance card.              19.  Visit our web site for additional information:  [email or web address]              20.During flu season no children under the age of 15 are permitted in the hospital for the safety of all patients. 21. If you take a long acting insulin in the evening only  take half of your usual  dose the night  before your procedure              22. If you use a c-pap please bring DOS if staying overnight,             23.For your convenience [facility] has a pharmacy on site to fill your prescriptions. 24. If you use oxygen and have a portable tank please bring it  with you the DOS             25. Bring a complete list of all your medications with name and dose include any supplements. 26. Other__________________________________________   *Please call pre admission testing if you any further questions   [address] [place]  [phone]   [facility] [address] [place]   [phone]           COVID TESTING    _x Covid test to be done 3-5 days prior to scheduled surgery -patient aware they are REQUIRED to bring a copy of the negative result DOS-if they receive a positive result to notify their surgeon         If known - indicate where patient is getting covid test done __11/1/2021, will bring__________    ___ Rapid - DOS    ___ Other___________________________________      Kye Andreaos POLICY(subject to change)    There is a one visitor policy at [facility] for all surgeries and endoscopies. Whether the visitor can stay or will be asked to wait in the car will depend on the current policy and if social distancing can be maintained. The policy is subject to change at any time. Please make sure the visitor has a cell phone that is on,charged and able to accept calls, as this may be the way that the staff communicates with them. Pain management is NO VISITOR policyThe patients ride is expected to remain in the car with a cell phone for communication. If the ride is leaving the hospital grounds please make sure they are back in time for pickup. Have the patient inform the staff on arrival what their rides plans are while the patient is in the facility. At the MAIN there is one visitor allowed. Please note that the visitor policy is subject to change. All above information reviewed with patient in person or by phone. Patient verbalizes understanding. All questions and concerns addressed.                                                                                                  Patient/Rep__patient_________________                                                                                                                                    PRE OP INSTRUCTIONS

## 2021-11-04 ENCOUNTER — ANESTHESIA (OUTPATIENT)
Dept: OPERATING ROOM | Age: 56
End: 2021-11-04
Payer: COMMERCIAL

## 2021-11-04 ENCOUNTER — APPOINTMENT (OUTPATIENT)
Dept: GENERAL RADIOLOGY | Age: 56
End: 2021-11-04
Attending: ORTHOPAEDIC SURGERY
Payer: COMMERCIAL

## 2021-11-04 ENCOUNTER — ANESTHESIA EVENT (OUTPATIENT)
Dept: OPERATING ROOM | Age: 56
End: 2021-11-04
Payer: COMMERCIAL

## 2021-11-04 ENCOUNTER — HOSPITAL ENCOUNTER (OUTPATIENT)
Age: 56
Setting detail: OUTPATIENT SURGERY
Discharge: HOME | End: 2021-11-04
Attending: ORTHOPAEDIC SURGERY | Admitting: ORTHOPAEDIC SURGERY
Payer: COMMERCIAL

## 2021-11-04 VITALS
OXYGEN SATURATION: 98 % | HEIGHT: 69 IN | TEMPERATURE: 97.6 F | DIASTOLIC BLOOD PRESSURE: 89 MMHG | SYSTOLIC BLOOD PRESSURE: 134 MMHG | RESPIRATION RATE: 14 BRPM | HEART RATE: 60 BPM | WEIGHT: 138 LBS | BODY MASS INDEX: 20.44 KG/M2

## 2021-11-04 VITALS
DIASTOLIC BLOOD PRESSURE: 81 MMHG | OXYGEN SATURATION: 99 % | RESPIRATION RATE: 4 BRPM | TEMPERATURE: 94.1 F | SYSTOLIC BLOOD PRESSURE: 126 MMHG

## 2021-11-04 DIAGNOSIS — M20.21 HALLUX RIGIDUS OF RIGHT FOOT: Primary | ICD-10-CM

## 2021-11-04 PROCEDURE — 3700000001 HC ADD 15 MINUTES (ANESTHESIA): Performed by: ORTHOPAEDIC SURGERY

## 2021-11-04 PROCEDURE — 3700000000 HC ANESTHESIA ATTENDED CARE: Performed by: ORTHOPAEDIC SURGERY

## 2021-11-04 PROCEDURE — 88311 DECALCIFY TISSUE: CPT

## 2021-11-04 PROCEDURE — 6360000002 HC RX W HCPCS: Performed by: ORTHOPAEDIC SURGERY

## 2021-11-04 PROCEDURE — 6370000000 HC RX 637 (ALT 250 FOR IP): Performed by: FAMILY MEDICINE

## 2021-11-04 PROCEDURE — 7100000011 HC PHASE II RECOVERY - ADDTL 15 MIN: Performed by: ORTHOPAEDIC SURGERY

## 2021-11-04 PROCEDURE — 7100000000 HC PACU RECOVERY - FIRST 15 MIN: Performed by: ORTHOPAEDIC SURGERY

## 2021-11-04 PROCEDURE — 28289 CORRJ HALUX RIGDUS W/O IMPLT: CPT | Performed by: ORTHOPAEDIC SURGERY

## 2021-11-04 PROCEDURE — 28289 CORRJ HALUX RIGDUS W/O IMPLT: CPT | Performed by: NURSE PRACTITIONER

## 2021-11-04 PROCEDURE — 2580000003 HC RX 258: Performed by: NURSE ANESTHETIST, CERTIFIED REGISTERED

## 2021-11-04 PROCEDURE — 2500000003 HC RX 250 WO HCPCS: Performed by: ORTHOPAEDIC SURGERY

## 2021-11-04 PROCEDURE — 3600000003 HC SURGERY LEVEL 3 BASE: Performed by: ORTHOPAEDIC SURGERY

## 2021-11-04 PROCEDURE — 2709999900 HC NON-CHARGEABLE SUPPLY: Performed by: ORTHOPAEDIC SURGERY

## 2021-11-04 PROCEDURE — 2500000003 HC RX 250 WO HCPCS: Performed by: NURSE ANESTHETIST, CERTIFIED REGISTERED

## 2021-11-04 PROCEDURE — 6360000002 HC RX W HCPCS: Performed by: NURSE ANESTHETIST, CERTIFIED REGISTERED

## 2021-11-04 PROCEDURE — 7100000010 HC PHASE II RECOVERY - FIRST 15 MIN: Performed by: ORTHOPAEDIC SURGERY

## 2021-11-04 PROCEDURE — 7100000001 HC PACU RECOVERY - ADDTL 15 MIN: Performed by: ORTHOPAEDIC SURGERY

## 2021-11-04 PROCEDURE — 6360000002 HC RX W HCPCS: Performed by: FAMILY MEDICINE

## 2021-11-04 PROCEDURE — 88304 TISSUE EXAM BY PATHOLOGIST: CPT

## 2021-11-04 PROCEDURE — 3600000013 HC SURGERY LEVEL 3 ADDTL 15MIN: Performed by: ORTHOPAEDIC SURGERY

## 2021-11-04 PROCEDURE — 2580000003 HC RX 258: Performed by: ORTHOPAEDIC SURGERY

## 2021-11-04 RX ORDER — FENTANYL CITRATE 50 UG/ML
50 INJECTION, SOLUTION INTRAMUSCULAR; INTRAVENOUS EVERY 5 MIN PRN
Status: DISCONTINUED | OUTPATIENT
Start: 2021-11-04 | End: 2021-11-04 | Stop reason: HOSPADM

## 2021-11-04 RX ORDER — PROPOFOL 10 MG/ML
INJECTION, EMULSION INTRAVENOUS PRN
Status: DISCONTINUED | OUTPATIENT
Start: 2021-11-04 | End: 2021-11-04 | Stop reason: SDUPTHER

## 2021-11-04 RX ORDER — HYDROMORPHONE HCL 110MG/55ML
0.25 PATIENT CONTROLLED ANALGESIA SYRINGE INTRAVENOUS EVERY 5 MIN PRN
Status: DISCONTINUED | OUTPATIENT
Start: 2021-11-04 | End: 2021-11-04 | Stop reason: HOSPADM

## 2021-11-04 RX ORDER — KETOROLAC TROMETHAMINE 30 MG/ML
INJECTION, SOLUTION INTRAMUSCULAR; INTRAVENOUS
Status: DISCONTINUED
Start: 2021-11-04 | End: 2021-11-04 | Stop reason: HOSPADM

## 2021-11-04 RX ORDER — PROMETHAZINE HYDROCHLORIDE 25 MG/ML
6.25 INJECTION, SOLUTION INTRAMUSCULAR; INTRAVENOUS PRN
Status: DISCONTINUED | OUTPATIENT
Start: 2021-11-04 | End: 2021-11-04 | Stop reason: HOSPADM

## 2021-11-04 RX ORDER — HYDROCODONE BITARTRATE AND ACETAMINOPHEN 5; 325 MG/1; MG/1
1 TABLET ORAL EVERY 6 HOURS PRN
Qty: 12 TABLET | Refills: 0 | Status: SHIPPED | OUTPATIENT
Start: 2021-11-04 | End: 2021-11-07

## 2021-11-04 RX ORDER — ONDANSETRON 2 MG/ML
INJECTION INTRAMUSCULAR; INTRAVENOUS PRN
Status: DISCONTINUED | OUTPATIENT
Start: 2021-11-04 | End: 2021-11-04 | Stop reason: SDUPTHER

## 2021-11-04 RX ORDER — SODIUM CHLORIDE 9 MG/ML
INJECTION, SOLUTION INTRAVENOUS CONTINUOUS
Status: DISCONTINUED | OUTPATIENT
Start: 2021-11-04 | End: 2021-11-04 | Stop reason: HOSPADM

## 2021-11-04 RX ORDER — MEPERIDINE HYDROCHLORIDE 25 MG/ML
12.5 INJECTION INTRAMUSCULAR; INTRAVENOUS; SUBCUTANEOUS EVERY 5 MIN PRN
Status: DISCONTINUED | OUTPATIENT
Start: 2021-11-04 | End: 2021-11-04 | Stop reason: HOSPADM

## 2021-11-04 RX ORDER — LABETALOL HYDROCHLORIDE 5 MG/ML
5 INJECTION, SOLUTION INTRAVENOUS EVERY 10 MIN PRN
Status: DISCONTINUED | OUTPATIENT
Start: 2021-11-04 | End: 2021-11-04 | Stop reason: HOSPADM

## 2021-11-04 RX ORDER — OXYCODONE HYDROCHLORIDE 5 MG/1
10 TABLET ORAL PRN
Status: COMPLETED | OUTPATIENT
Start: 2021-11-04 | End: 2021-11-04

## 2021-11-04 RX ORDER — FENTANYL CITRATE 50 UG/ML
INJECTION, SOLUTION INTRAMUSCULAR; INTRAVENOUS PRN
Status: DISCONTINUED | OUTPATIENT
Start: 2021-11-04 | End: 2021-11-04 | Stop reason: SDUPTHER

## 2021-11-04 RX ORDER — CEPHALEXIN 500 MG/1
500 CAPSULE ORAL 4 TIMES DAILY
Qty: 12 CAPSULE | Refills: 0 | Status: SHIPPED | OUTPATIENT
Start: 2021-11-04 | End: 2021-11-07

## 2021-11-04 RX ORDER — SODIUM CHLORIDE 9 MG/ML
INJECTION, SOLUTION INTRAVENOUS CONTINUOUS PRN
Status: DISCONTINUED | OUTPATIENT
Start: 2021-11-04 | End: 2021-11-04 | Stop reason: SDUPTHER

## 2021-11-04 RX ORDER — OXYCODONE HYDROCHLORIDE 5 MG/1
5 TABLET ORAL PRN
Status: COMPLETED | OUTPATIENT
Start: 2021-11-04 | End: 2021-11-04

## 2021-11-04 RX ORDER — KETOROLAC TROMETHAMINE 30 MG/ML
30 INJECTION, SOLUTION INTRAMUSCULAR; INTRAVENOUS ONCE
Status: COMPLETED | OUTPATIENT
Start: 2021-11-04 | End: 2021-11-04

## 2021-11-04 RX ORDER — DEXAMETHASONE SODIUM PHOSPHATE 4 MG/ML
INJECTION, SOLUTION INTRA-ARTICULAR; INTRALESIONAL; INTRAMUSCULAR; INTRAVENOUS; SOFT TISSUE PRN
Status: DISCONTINUED | OUTPATIENT
Start: 2021-11-04 | End: 2021-11-04 | Stop reason: SDUPTHER

## 2021-11-04 RX ORDER — HYDROMORPHONE HCL 110MG/55ML
0.5 PATIENT CONTROLLED ANALGESIA SYRINGE INTRAVENOUS EVERY 5 MIN PRN
Status: DISCONTINUED | OUTPATIENT
Start: 2021-11-04 | End: 2021-11-04 | Stop reason: HOSPADM

## 2021-11-04 RX ORDER — BUPIVACAINE HYDROCHLORIDE 5 MG/ML
INJECTION, SOLUTION EPIDURAL; INTRACAUDAL
Status: COMPLETED | OUTPATIENT
Start: 2021-11-04 | End: 2021-11-04

## 2021-11-04 RX ORDER — LIDOCAINE HYDROCHLORIDE 20 MG/ML
INJECTION, SOLUTION EPIDURAL; INFILTRATION; INTRACAUDAL; PERINEURAL PRN
Status: DISCONTINUED | OUTPATIENT
Start: 2021-11-04 | End: 2021-11-04 | Stop reason: SDUPTHER

## 2021-11-04 RX ORDER — DIPHENHYDRAMINE HYDROCHLORIDE 50 MG/ML
12.5 INJECTION INTRAMUSCULAR; INTRAVENOUS
Status: DISCONTINUED | OUTPATIENT
Start: 2021-11-04 | End: 2021-11-04 | Stop reason: HOSPADM

## 2021-11-04 RX ADMIN — FENTANYL CITRATE 50 MCG: 50 INJECTION, SOLUTION INTRAMUSCULAR; INTRAVENOUS at 07:21

## 2021-11-04 RX ADMIN — DEXAMETHASONE SODIUM PHOSPHATE 8 MG: 4 INJECTION, SOLUTION INTRAMUSCULAR; INTRAVENOUS at 07:14

## 2021-11-04 RX ADMIN — OXYCODONE 5 MG: 5 TABLET ORAL at 08:05

## 2021-11-04 RX ADMIN — KETOROLAC TROMETHAMINE 30 MG: 30 INJECTION, SOLUTION INTRAMUSCULAR at 08:35

## 2021-11-04 RX ADMIN — CEFAZOLIN 2000 MG: 10 INJECTION, POWDER, FOR SOLUTION INTRAVENOUS at 06:57

## 2021-11-04 RX ADMIN — SODIUM CHLORIDE: 9 INJECTION, SOLUTION INTRAVENOUS at 06:32

## 2021-11-04 RX ADMIN — ONDANSETRON 4 MG: 2 INJECTION INTRAMUSCULAR; INTRAVENOUS at 07:14

## 2021-11-04 RX ADMIN — PROPOFOL 200 MG: 10 INJECTION, EMULSION INTRAVENOUS at 07:01

## 2021-11-04 RX ADMIN — LIDOCAINE HYDROCHLORIDE 100 MG: 20 INJECTION, SOLUTION EPIDURAL; INFILTRATION; INTRACAUDAL; PERINEURAL at 07:01

## 2021-11-04 RX ADMIN — FENTANYL CITRATE 50 MCG: 50 INJECTION, SOLUTION INTRAMUSCULAR; INTRAVENOUS at 07:01

## 2021-11-04 RX ADMIN — SODIUM CHLORIDE: 9 INJECTION, SOLUTION INTRAVENOUS at 06:56

## 2021-11-04 ASSESSMENT — PAIN SCALES - GENERAL
PAINLEVEL_OUTOF10: 5
PAINLEVEL_OUTOF10: 6

## 2021-11-04 ASSESSMENT — PAIN DESCRIPTION - LOCATION
LOCATION: FOOT
LOCATION: FOOT

## 2021-11-04 ASSESSMENT — PULMONARY FUNCTION TESTS
PIF_VALUE: 4
PIF_VALUE: 12
PIF_VALUE: 3
PIF_VALUE: 1
PIF_VALUE: 2
PIF_VALUE: 2
PIF_VALUE: 13
PIF_VALUE: 12
PIF_VALUE: 10
PIF_VALUE: 1
PIF_VALUE: 15
PIF_VALUE: 7
PIF_VALUE: 9
PIF_VALUE: 0
PIF_VALUE: 11
PIF_VALUE: 3
PIF_VALUE: 6
PIF_VALUE: 3
PIF_VALUE: 2
PIF_VALUE: 0
PIF_VALUE: 8
PIF_VALUE: 12
PIF_VALUE: 5
PIF_VALUE: 12
PIF_VALUE: 13
PIF_VALUE: 2
PIF_VALUE: 4
PIF_VALUE: 2
PIF_VALUE: 11
PIF_VALUE: 10
PIF_VALUE: 1
PIF_VALUE: 4
PIF_VALUE: 1
PIF_VALUE: 2
PIF_VALUE: 1
PIF_VALUE: 4
PIF_VALUE: 2
PIF_VALUE: 0
PIF_VALUE: 2
PIF_VALUE: 1
PIF_VALUE: 2
PIF_VALUE: 0
PIF_VALUE: 2
PIF_VALUE: 8
PIF_VALUE: 1
PIF_VALUE: 1
PIF_VALUE: 0
PIF_VALUE: 2

## 2021-11-04 ASSESSMENT — PAIN - FUNCTIONAL ASSESSMENT: PAIN_FUNCTIONAL_ASSESSMENT: 0-10

## 2021-11-04 ASSESSMENT — PAIN DESCRIPTION - ORIENTATION
ORIENTATION: RIGHT
ORIENTATION: RIGHT

## 2021-11-04 ASSESSMENT — PAIN DESCRIPTION - PAIN TYPE
TYPE: SURGICAL PAIN
TYPE: SURGICAL PAIN

## 2021-11-04 NOTE — ANESTHESIA POSTPROCEDURE EVALUATION
Department of Anesthesiology  Postprocedure Note    Patient: Eloisa Nuñez  MRN: [id number]  YOB: 1965  Date of evaluation: 11/4/2021  Time:  10:32 AM     Procedure Summary     Date: 11/04/21 Room / Location: [address]    Anesthesia Start: 1250 Anesthesia Stop: 2537    Procedure: RIGHT GREAT TOE CHEILECTOMY (Right ) Diagnosis: (M20.21 RIGHT FOOT HALLUX RIGIDUS)    Surgeons: Anne Nguyen MD Responsible Provider: Drea Cruz MD    Anesthesia Type: general ASA Status: 2          Anesthesia Type: general    Mary Ellen Phase I: Mary Ellen Score: 10    Mary Ellen Phase II: Mary Ellen Score: 10    Last vitals: Reviewed and per EMR flowsheets.        Anesthesia Post Evaluation    Level of consciousness: awake  Complications: no  Multimodal analgesia pain management approach

## 2021-11-04 NOTE — H&P
Preoperative H&P Update    The patient's History and Physical in the medical record from 10/8/2021 was reviewed by me today. Past Medical History:   Diagnosis Date    Anxiety     Basal cell carcinoma     chest , stomach    DDD (degenerative disc disease), cervical     L4-5; L5-S1    History of melanoma 2000    right thigh     Infertility, female     Lumbar foraminal stenosis     Melanoma ([facility] [address]     Menopause ovarian failure     Menorrhagia     Migraine     Ovarian cyst      Past Surgical History:   Procedure Laterality Date    BACK SURGERY  2000    X 4    BREAST ENHANCEMENT SURGERY      COLONOSCOPY N/A 8/11/2020    COLONOSCOPY POLYPECTOMY SNARE/COLD BIOPSY performed by Nemo Tran MD at [facility]  2013    FOOT SURGERY Left     HEMORRHOID SURGERY      internal hemorrhoid banding- Dr. Kerri Negro in early 26ths     No current facility-administered medications on file prior to encounter. Current Outpatient Medications on File Prior to Encounter   Medication Sig Dispense Refill    ibuprofen (ADVIL;MOTRIN) 200 MG tablet Take 200 mg by mouth every 6 hours as needed for Pain      butalbital-acetaminophen-caffeine (FIORICET, ESGIC) -40 MG per tablet TAKE ONE TABLET BY MOUTH EVERY 4 HOURS AS NEEDED FOR HEADACHE 30 tablet 1    clobetasol (TEMOVATE) 0.05 % ointment Apply to affected right arm area, twice daily for 1-2 weeks then as needed for flares.  45 g 2    escitalopram (LEXAPRO) 10 MG tablet Take 1 tablet by mouth daily 90 tablet 2    ipratropium (ATROVENT) 0.06 % nasal spray 2 sprays by Nasal route 2 times daily (Patient taking differently: 2 sprays by Nasal route 2 times daily as needed ) 3 Bottle 3    estradiol-norethindrone (ACTIVELLA) 1-0.5 MG per tablet TAKE 1 TABLET BY MOUTH ONE TIME A DAY 90 tablet 2       Allergies   Allergen Reactions    Iodine Hives    Other      XRAY DYE      I reviewed the HPI, medications, allergies, reason for surgery, diagnosis and treatment plan and there has been no change. The patient was evaluated by me today. Physical exam findings for this update include:    Vitals:    11/04/21 0624   BP: (!) 142/92   Pulse: 69   Resp: 16   Temp: 98 °F (36.7 °C)   SpO2: 100%     Airway is intact  Chest: chest clear, no wheezing, rales, normal symmetric air entry, no tachypnea, retractions or cyanosis  Heart: regular rate and rhythm ; heart sounds normal  Findings on exam of the body region where surgery is to be performed include:  Right great toe Hallux rigidus.     Electronically signed by Alexei Lara MD on 11/4/2021 at 6:27 AM

## 2021-11-04 NOTE — ANESTHESIA PRE PROCEDURE
Department of Anesthesiology  Preprocedure Note       Name:  Nancy Burleson   Age:  61 y.o.  :  1965                                          MRN:  [id number]         Date:  2021      Surgeon: Namita Carey):  Alexei Lara MD    Procedure: Procedure(s):  RIGHT GREAT TOE CHEILECTOMY    Medications prior to admission:   Prior to Admission medications    Medication Sig Start Date End Date Taking? Authorizing Provider   ibuprofen (ADVIL;MOTRIN) 200 MG tablet Take 200 mg by mouth every 6 hours as needed for Pain   Yes Historical Provider, MD   butalbital-acetaminophen-caffeine (FIORICET, ESGIC) -40 MG per tablet TAKE ONE TABLET BY MOUTH EVERY 4 HOURS AS NEEDED FOR HEADACHE 8/10/21  Yes Robyn Garcia MD   clobetasol (TEMOVATE) 0.05 % ointment Apply to affected right arm area, twice daily for 1-2 weeks then as needed for flares.  21  Yes Kenneth William MD   escitalopram (LEXAPRO) 10 MG tablet Take 1 tablet by mouth daily 21  Yes Robyn Garcia MD   ipratropium (ATROVENT) 0.06 % nasal spray 2 sprays by Nasal route 2 times daily  Patient taking differently: 2 sprays by Nasal route 2 times daily as needed  21 Yes Robyn Garcia MD   estradiol-norethindrone (ACTIVELLA) 1-0.5 MG per tablet TAKE 1 TABLET BY MOUTH ONE TIME A DAY 21  Yes Robyn Garcia MD       Current medications:    Current Facility-Administered Medications   Medication Dose Route Frequency Provider Last Rate Last Admin    ceFAZolin (ANCEF) 2000 mg in dextrose 5 % 50 mL IVPB  2,000 mg IntraVENous On Call to [address] MD        0.9 % sodium chloride infusion   IntraVENous Continuous Alexei Lara MD        meperidine (DEMEROL) injection 12.5 mg  12.5 mg IntraVENous Q5 Min PRN Thomas Dexter MD        HYDROmorphone (DILAUDID) injection 0.25 mg  0.25 mg IntraVENous Q5 Min PRN Thomas Dexter MD        fentaNYL (SUBLIMAZE) injection 50 mcg  50 mcg IntraVENous Q5 Min PRN Thomas Dexter MD        HYDROmorphone tobacco: Never Used   Substance Use Topics    Alcohol use: Yes     Comment: socially                                Counseling given: Not Answered      Vital Signs (Current):   Vitals:    11/02/21 1425   Weight: 140 lb (63.5 kg)   Height: 5' 9\" (1.753 m)                                              BP Readings from Last 3 Encounters:   10/08/21 118/80   07/28/21 122/80   06/02/21 122/74       NPO Status:                                                                                 BMI:   Wt Readings from Last 3 Encounters:   11/02/21 140 lb (63.5 kg)   10/08/21 145 lb (65.8 kg)   08/31/21 145 lb (65.8 kg)     Body mass index is 20.67 kg/m². CBC:   Lab Results   Component Value Date    WBC 5.8 06/04/2021    RBC 4.84 06/04/2021    HGB 16.1 06/04/2021    HCT 45.6 06/04/2021    MCV 94.2 06/04/2021    RDW 12.6 06/04/2021     06/04/2021       CMP:   Lab Results   Component Value Date     06/04/2021    K 4.0 06/04/2021    K 4.3 12/01/2020    CL 98 06/04/2021    CO2 25 06/04/2021    BUN 11 06/04/2021    CREATININE 0.7 06/04/2021    GFRAA >60 06/04/2021    AGRATIO 2.5 06/04/2021    LABGLOM >60 06/04/2021    GLUCOSE 87 07/15/2021    PROT 6.7 06/04/2021    CALCIUM 9.3 06/04/2021    BILITOT 0.4 06/04/2021    ALKPHOS 63 06/04/2021    AST 17 06/04/2021    ALT 14 06/04/2021       POC Tests: No results for input(s): POCGLU, POCNA, POCK, POCCL, POCBUN, POCHEMO, POCHCT in the last 72 hours.     Coags: No results found for: PROTIME, INR, APTT    HCG (If Applicable): No results found for: PREGTESTUR, PREGSERUM, HCG, HCGQUANT     ABGs: No results found for: PHART, PO2ART, MUX2JAE, URG4PIS, BEART, A7QZVJRV     Type & Screen (If Applicable):  No results found for: LABABO, LABRH    Drug/Infectious Status (If Applicable):  No results found for: HIV, HEPCAB    COVID-19 Screening (If Applicable):   Lab Results   Component Value Date    COVID19 Not Detected 11/19/2020    COVID19 NOT DETECTED 08/06/2020           Anesthesia Evaluation    Airway: Mallampati: II  TM distance: >3 FB   Neck ROM: full  Mouth opening: > = 3 FB Dental:          Pulmonary:                              Cardiovascular:            Rhythm: regular  Rate: normal                    Neuro/Psych:   (+) headaches:,             GI/Hepatic/Renal:             Endo/Other:                     Abdominal:             Vascular: Other Findings:             Anesthesia Plan      general     ASA 2       Induction: intravenous. Anesthetic plan and risks discussed with patient. Plan discussed with CRNA.                   Deborah Del Real MD   11/4/2021

## 2021-11-04 NOTE — OP NOTE
[facility]                     [facility], [address] [place] [address]                                OPERATIVE REPORT    PATIENT NAME: Dina Cyr                 :        1965  MED REC NO:   [id number]                          ROOM:  ACCOUNT NO:   [de-identified]                           ADMIT DATE: 2021  PROVIDER:     Derik Tello MD    DATE OF PROCEDURE:  2021    PRIMARY CARE PROVIDER:  David Marin MD    PREOPERATIVE DIAGNOSIS:  Right foot great toe hallux rigidus. POSTOPERATIVE DIAGNOSIS:  Right foot great toe hallux rigidus. OPERATION PERFORMED:  Repair of right foot great toe hallux rigidus with  cheilectomy and debridement of the joint. SURGEON:  Derik Tello MD    ASSISTANT:  Faiza Pham CNP    ANESTHESIA:  General anesthesia. ESTIMATED BLOOD LOSS:  Minimal.    COMPLICATIONS:  None. TOURNIQUET:  Right upper calf 250 mmHg. FINDINGS:  Advanced arthritis of the great toe metatarsophalangeal joint  and a very large dorsal osteophyte. INDICATION:  This is a 48-year-old white female, who has been  complaining of right great toe pain. She was diagnosed with hallux  rigidus. She has a significant dorsal osteophyte. We discussed the  risks and benefits of a cheilectomy versus fusion. She would like to  proceed with cheilectomy. All risks, benefits, and alternatives were  discussed with the patient. She agreed to proceed with the surgical  treatment. DESCRIPTION OF PROCEDURE:  The patient's ankle was marked. She received  2 gm Ancef IV preoperatively. The patient was then brought to the  operating room and underwent general anesthesia. A well-padded  tourniquet was placed in the right upper calf. The right lower  extremity was then prepped and draped in a regular sterile routine  fashion. A time-out was called confirming the patient name, site, and  procedure.     Esmarch was used for exsanguination and tourniquet was inflated to 250  mmHg. A dorsal incision was made over the right great toe  metatarsophalangeal joint. Arthrotomy was done medial to the EHL  tendon. She has a significantly large dorsal osteophyte over the first  metatarsal head. We explored the joint and there was advanced arthritis  with only minimal amount of cartilage left at the very plantar aspect of  the joint and most of the metatarsal head as well as the base has bare  bone. We used a saw to cut off the dorsal third of the joint at both  the metatarsal head as well as the proximal phalanx base. We then used  the rongeur to remove the side osteophytes as well. We then used a  rasp to smoothen up the edges. We took picture intraoperatively with a  mini C-arm that confirmed removal of the dorsal osteophyte in the upper  part of the joint. At this point, we irrigated the joint copiously with  normal saline mixed with gentamicin. We then let the tourniquet down  and hemostasis was secured. We then irrigated again and then closed the  capsule with a 2-0 Vicryl, subcu with a 3-0 Vicryl, and the skin with a  4-0 Monocryl. Steri-Strips was then applied. Dressing was then applied  in the form of Xeroform, 4 x 4, sterile Webril, Ace wrap, and a postop  shoe. The patient tolerated the procedure well and was taken to the recovery  in stable condition. Alfreda Will CNP was 1st Assist given the nature of the procedure that needed advanced assistance. POSTOPERATIVE PLAN:  The patient will be discharged home. She can be  weightbearing as tolerated, but no heavy-impact activities for at least  first two weeks. Parul Das MD    D: 11/04/2021 8:04:34       T: 11/04/2021 11:09:59     [id number]  Job#: [id number]     Doc#: [id number]    CC:   Ana Hopper MD

## 2021-11-04 NOTE — PROGRESS NOTES
Received from OR - Drowsy,simple mask,right foot dressing/ace dry and intact,circulation good,ice placed,elevated,vss.

## 2021-11-04 NOTE — BRIEF OP NOTE
Brief Postoperative Note      Patient:  Lu Batres  YOB: 1965  MRN: [id number]    Date of Procedure: 11/4/2021    Pre-Op Diagnosis: M20.21 RIGHT FOOT HALLUX RIGIDUS    Post-Op Diagnosis: Same       Procedure(s):  RIGHT GREAT TOE CHEILECTOMY    Surgeon(s):  Devaughn Mcknight MD    Assistant: Tulio Jasso CNP    Anesthesia: General    Estimated Blood Loss (mL): Minimal    Complications: None    Specimens:   ID Type Source Tests Collected by Time Destination   A :  Tissue Tissue SURGICAL PATHOLOGY Devaughn Mcknight MD 11/4/2021 0715        Implants:  * No implants in log *      Drains:   Urethral Catheter Non-latex (Active)       Findings: Same    Electronically signed by Devaughn Mcknight MD on 11/4/2021 at 7:57 AM

## 2021-11-04 NOTE — PROGRESS NOTES
Teaching/ education completed for home care including pain management, wound care,actvity,safety precautions and infection control. Patient verbalized understanding.

## 2021-11-05 ENCOUNTER — PATIENT MESSAGE (OUTPATIENT)
Dept: FAMILY MEDICINE CLINIC | Age: 56
End: 2021-11-05

## 2021-11-05 DIAGNOSIS — S46.812A STRAIN OF LEFT TRAPEZIUS MUSCLE, INITIAL ENCOUNTER: ICD-10-CM

## 2021-11-05 RX ORDER — CYCLOBENZAPRINE HCL 10 MG
10 TABLET ORAL 3 TIMES DAILY PRN
Qty: 30 TABLET | Refills: 0 | Status: SHIPPED | OUTPATIENT
Start: 2021-11-05 | End: 2022-07-13 | Stop reason: SDUPTHER

## 2021-11-05 NOTE — TELEPHONE ENCOUNTER
From: Alma Pritchard  To: Charity Carrizales MD  Sent: 11/5/2021 3:19 PM EDT  Subject: Fernandez Allen - I need to refill my muscle relaxer prescription.

## 2021-11-18 ENCOUNTER — OFFICE VISIT (OUTPATIENT)
Dept: ORTHOPEDIC SURGERY | Age: 56
End: 2021-11-18
Payer: COMMERCIAL

## 2021-11-18 ENCOUNTER — TELEPHONE (OUTPATIENT)
Dept: ORTHOPEDIC SURGERY | Age: 56
End: 2021-11-18

## 2021-11-18 VITALS — HEIGHT: 69 IN | BODY MASS INDEX: 21.03 KG/M2 | WEIGHT: 142 LBS

## 2021-11-18 DIAGNOSIS — M20.21 HALLUX RIGIDUS OF RIGHT FOOT: Primary | ICD-10-CM

## 2021-11-18 PROCEDURE — 99024 POSTOP FOLLOW-UP VISIT: CPT | Performed by: ORTHOPAEDIC SURGERY

## 2021-11-18 NOTE — PROGRESS NOTES
DIAGNOSIS:  Right Hallux rigidus, status post cheilectomy. DATE OF SURGERY:  11/4/2021. HISTORY OF PRESENT ILLNESS:    Ms. Kendra Mcnamara 61 y.o. female 2 weeks out from her surgery. She has been partial weightbearing on the heel in a post-op shoe. She denies any sharp pain. No fever or chills. No numbness or tingling sensation. PHYSICAL EXAMINATION:    The dressing was removed. the incision is healing nicely. No signs of any erythema or drainage, with minimal to no swelling. No pain with big toe ROM. She has intact sensation in the right foot. IMAGING:  X-rays were taken in the office today, 3 views of the right foot, and showed the cheilectomy, and the removal of dorsal osteophyte. IMPRESSION:  2 weeks out from right Hallux rigidus cheilectomy. , and doing well. PLAN:   She will be full weightbearing, and start aggressive ROM of 1st MP joint. I told her about the advanced arthritis of her joint that was seen in surgery. She will come back in 3 months. At that time, we will get x-rays, 3 views of the right foot.          Hilda Pierce MD

## 2021-11-18 NOTE — TELEPHONE ENCOUNTER
Called and LVM. Notifiying patient we are trying to move her appointment for today to next Tuesday.      **Upon return call please try to reschedule patient for 11/23/2021 with Dayday Suyapa at the [place] location**

## 2021-11-24 ENCOUNTER — OFFICE VISIT (OUTPATIENT)
Dept: FAMILY MEDICINE CLINIC | Age: 56
End: 2021-11-24
Payer: COMMERCIAL

## 2021-11-24 VITALS
OXYGEN SATURATION: 100 % | RESPIRATION RATE: 19 BRPM | HEART RATE: 85 BPM | DIASTOLIC BLOOD PRESSURE: 80 MMHG | TEMPERATURE: 97.3 F | BODY MASS INDEX: 21.8 KG/M2 | WEIGHT: 147.2 LBS | HEIGHT: 69 IN | SYSTOLIC BLOOD PRESSURE: 120 MMHG

## 2021-11-24 DIAGNOSIS — M20.21 HALLUX RIGIDUS OF RIGHT FOOT: ICD-10-CM

## 2021-11-24 DIAGNOSIS — M50.30 DDD (DEGENERATIVE DISC DISEASE), CERVICAL: ICD-10-CM

## 2021-11-24 DIAGNOSIS — E78.00 HYPERCHOLESTEREMIA: ICD-10-CM

## 2021-11-24 DIAGNOSIS — M48.061 LUMBAR FORAMINAL STENOSIS: ICD-10-CM

## 2021-11-24 DIAGNOSIS — Z01.818 PREOP EXAMINATION: Primary | ICD-10-CM

## 2021-11-24 DIAGNOSIS — F41.9 ANXIETY: ICD-10-CM

## 2021-11-24 PROCEDURE — 99243 OFF/OP CNSLTJ NEW/EST LOW 30: CPT | Performed by: FAMILY MEDICINE

## 2021-11-24 NOTE — PROGRESS NOTES
Chief Complaint:     Chris López is a 61 y.o. female who presents for a preoperative physical examination. She is scheduled to have Liposuction done by Dr. Liana Bravo at [facility] on 12/14/21. History of Present Illness:      Patient with elective surgery . No abdominal pain or bowel movement issues. No constipation / diarrhea. History of RBB  X 5 years. Patient denies chest pain, palpitations, or shortness of breath . No personal or family history of bleeding, clotting, or anesthesia problems.      Past Medical History:   Diagnosis Date    Anxiety     Basal cell carcinoma     chest , stomach    DDD (degenerative disc disease), cervical     L4-5; L5-S1    History of melanoma 2000    right thigh     Infertility, female     Lumbar foraminal stenosis     [facility]oma ([facility] [address]     Menopause ovarian failure     Menorrhagia     Migraine     Ovarian cyst         Review of patient's past surgical history indicates:     Past Surgical History:   Procedure Laterality Date    BACK SURGERY  2000    X 4    BREAST ENHANCEMENT SURGERY      COLONOSCOPY N/A 8/11/2020    COLONOSCOPY POLYPECTOMY SNARE/COLD BIOPSY performed by Yeny Coates MD at [address] [place] [address]  2013    FOOT SURGERY Left     FOOT SURGERY Right 11/4/2021    RIGHT GREAT TO[facility]      internal hemorrhoid banding- Dr. Raman Hernandez in early 26ths                                                   Current Outpatient Medications   Medication Sig Dispense Refill    ibuprofen (ADVIL;MOTRIN) 200 MG tablet Take 200 mg by mouth every 6 hours as needed for Pain      butalbital-acetaminophen-caffeine (FIORICET, ESGIC) -40 MG per tablet TAKE ONE TABLET BY MOUTH EVERY 4 HOURS AS NEEDED FOR HEADACHE 30 tablet 1    clobetasol (TEMOVATE) 0.05 % ointment Apply to affected right arm area, twice daily for conjunctivae/corneas clear. PERRLA, EOM's intact. Ears - External ears normal. Canals clear. TM's normal.  Nose/Sinuses - Nares normal. Septum midline. Mucosa normal. No drainage or sinus tenderness. Oropharynx - Lips, mucosa, and tongue normal. Teeth and gums normal. Oropharynx  clear  Neck - Neck supple. No adenopathy. Thyroid symmetric, normal size,  Back - Back symmetric, no curvature. ROM normal. No CVA tenderness. Lungs - Percussion normal. Good diaphragmatic excursion. Lungs clear  Heart - Regular rate and rhythm, with no rub, murmur or gallop noted. No edema. Abdomen - Abdomen soft, non-tender. BS normal. No masses, organomegaly  Extremities - Extremities normal. No deformities, edema, or skin discoloration. Musculoskeletal - Spine ROM normal. Muscular strength intact. Peripheral pulses - radial=4/4,, femoral=4/4, popliteal=4/4, dorsalis pedis=4/4,  Neuro - Gait normal. Reflexes normal and symmetric. Sensation grossly normal.  No focal weakness    EKG: normal EKG, normal sinus rhythm, RBBB. Assessment:        Per encounter diagnoses  She is medically cleared for surgery and anesthesia. Avoid Aspirin, non steroidal anti inflammatory medications, including Motrin, Aleve, Ibuprofen, Advil; multi vitamins, Vitamin E, omega 3 fish oil, and glucosamine chondroitin for the 7 days prior to surgery. Plan:        1. Preop examination  - Medically cleared  - CBC; Future  - Renal Function Panel; Future    2. Lumbar foraminal stenosis  - Stable. 3. DDD (degenerative disc disease), cervical  - Stable. 4. Anxiety  - Stable . Continue Lexapro qd.     5. Hallux rigidus of right foot  - Stable. S/p surgery in 11/4/21    Per operating surgeon. See also orders filed with this encounter, if any.

## 2021-12-04 DIAGNOSIS — Z78.0 MENOPAUSE: ICD-10-CM

## 2021-12-06 RX ORDER — ESTRADIOL AND NORETHINDRONE ACETATE 1; .5 MG/1; MG/1
TABLET ORAL
Qty: 84 TABLET | Refills: 2 | Status: SHIPPED | OUTPATIENT
Start: 2021-12-06 | End: 2022-05-12 | Stop reason: SDUPTHER

## 2021-12-06 NOTE — TELEPHONE ENCOUNTER
Requested Prescriptions     Pending Prescriptions Disp Refills    estradiol-norethindrone (ACTIVELLA) 1-0.5 MG per tablet [Pharmacy Med Name: ESTRADIOL-NORETHINDRONE  1-0.5 TABS] 84 tablet 2     Sig: TAKE 1 TABLET BY MOUTH ONE TIME A DAY     Last ov 11/24/2021   Last labs 6/4/21

## 2022-02-22 ENCOUNTER — OFFICE VISIT (OUTPATIENT)
Dept: ORTHOPEDIC SURGERY | Age: 57
End: 2022-02-22
Payer: COMMERCIAL

## 2022-02-22 VITALS — BODY MASS INDEX: 21.77 KG/M2 | HEIGHT: 69 IN | WEIGHT: 147 LBS

## 2022-02-22 DIAGNOSIS — M20.21 HALLUX RIGIDUS OF RIGHT FOOT: ICD-10-CM

## 2022-02-22 PROCEDURE — 20600 DRAIN/INJ JOINT/BURSA W/O US: CPT | Performed by: ORTHOPAEDIC SURGERY

## 2022-02-22 RX ORDER — TRIAMCINOLONE ACETONIDE 40 MG/ML
20 INJECTION, SUSPENSION INTRA-ARTICULAR; INTRAMUSCULAR ONCE
Status: COMPLETED | OUTPATIENT
Start: 2022-02-22 | End: 2022-02-22

## 2022-02-22 RX ORDER — LIDOCAINE HYDROCHLORIDE 10 MG/ML
0.5 INJECTION, SOLUTION INFILTRATION; PERINEURAL ONCE
Status: COMPLETED | OUTPATIENT
Start: 2022-02-22 | End: 2022-02-22

## 2022-02-22 RX ORDER — NAPROXEN 500 MG/1
500 TABLET ORAL 2 TIMES DAILY WITH MEALS
Qty: 60 TABLET | Refills: 0 | Status: SHIPPED | OUTPATIENT
Start: 2022-02-22 | End: 2022-05-12

## 2022-02-22 RX ADMIN — TRIAMCINOLONE ACETONIDE 20 MG: 40 INJECTION, SUSPENSION INTRA-ARTICULAR; INTRAMUSCULAR at 14:51

## 2022-02-22 RX ADMIN — LIDOCAINE HYDROCHLORIDE 0.5 ML: 10 INJECTION, SOLUTION INFILTRATION; PERINEURAL at 14:50

## 2022-02-22 NOTE — PROGRESS NOTES
DIAGNOSIS:  Right Hallux rigidus, status post cheilectomy. DATE OF SURGERY:  11/4/2021. HISTORY OF PRESENT ILLNESS:    Ms. Anne Cole 61 y.o. female  3 months out from her surgery. She has been weightbearing and reports that she still has pain in the great toe. She is limited in her shoe wear d/t pain and only wear gym shoes. She denies any sharp pain. Rates her pain a 5/10 VAS and is very tender over the joint. No fever or chills. No numbness or tingling sensation.      Past Medical History:   Diagnosis Date    Anxiety     Basal cell carcinoma     chest , stomach    DDD (degenerative disc disease), cervical     L4-5; L5-S1    History of melanoma 2000    right thigh     Infertility, female     Lumbar foraminal stenosis     Melanoma ([facility] [address]     Menopause ovarian failure     Menorrhagia     Migraine     Ovarian cyst        Past Surgical History:   Procedure Laterality Date    BACK SURGERY  2000    X 4    BREAST ENHANCEMENT SURGERY      COLONOSCOPY N/A 8/11/2020    COLONOSCOPY POLYPECTOMY SNARE/COLD BIOPSY performed by Louis Lyon MD at [address] [place] [address]  2013   [address] Left     FOOT SURGERY Right 11/4/2021    RIGHT GREAT [facility]      internal hemorrhoid banding- Dr. Whitlock Pain in early 26ths       Social History     Socioeconomic History    Marital status: Single     Spouse name: Not on file    Number of children: Not on file    Years of education: Not on file    Highest education level: Not on file   Occupational History    Not on file   Tobacco Use    Smoking status: Never Smoker    Smokeless tobacco: Never Used   Vaping Use    Vaping Use: Never used   Substance and Sexual Activity    Alcohol use: Yes     Comment: socially    Drug use: Never    Sexual activity: Not Currently   Other Topics Concern    Not on file   Social History Narrative    Not on file     Social Determinants of Health     Financial Resource Strain: Low Risk     Difficulty of Paying Living Expenses: Not hard at all   Food Insecurity: No Food Insecurity    Worried About Running Out of Food in the Last Year: Never true    [address] in the Last Year: Never true   Transportation Needs:     Lack of Transportation (Medical): Not on file    Lack of Transportation (Non-Medical):  Not on file   Physical Activity:     Days of Exercise per Week: Not on file    Minutes of Exercise per Session: Not on file   Stress:     Feeling of Stress : Not on file   Social Connections:     Frequency of Communication with Friends and Family: Not on file    Frequency of Social Gatherings with Friends and Family: Not on file    Attends Cheondoism Services: Not on file    Active Member of [address] [place] [address] [employer] or Organizations: Not on file    Attends Club or Organization Meetings: Not on file    Marital Status: Not on file   Intimate Partner Violence:     Fear of Current or Ex-Partner: Not on file    Emotionally Abused: Not on file    Physically Abused: Not on file    Sexually Abused: Not on file   Housing Stability:     Unable to Pay for Housing in the Last Year: Not on file    Number of [place] in the Last Year: Not on file    Unstable Housing in the Last Year: Not on file       Family History   Problem Relation Age of Onset    Arthritis Mother         back    Other Mother         balance    Diabetes type 2  Father 72    Coronary Art Dis Father 72    Hypertension Father     Arthritis Father     Colon Cancer Maternal Grandmother     Cancer Brother         melanoma    Breast Cancer Maternal Aunt     Breast Cancer Maternal Cousin        Current Outpatient Medications on File Prior to Visit   Medication Sig Dispense Refill    estradiol-norethindrone (ACTIVELLA) 1-0.5 MG per tablet TAKE 1 TABLET BY MOUTH ONE TIME A DAY 84 tablet 2    ibuprofen (ADVIL;MOTRIN) 200 MG tablet Take 200 mg by mouth every 6 hours as needed for Pain (Patient not taking: Reported on 11/24/2021)      butalbital-acetaminophen-caffeine (FIORICET, ESGIC) -40 MG per tablet TAKE ONE TABLET BY MOUTH EVERY 4 HOURS AS NEEDED FOR HEADACHE 30 tablet 1    clobetasol (TEMOVATE) 0.05 % ointment Apply to affected right arm area, twice daily for 1-2 weeks then as needed for flares. 45 g 2    escitalopram (LEXAPRO) 10 MG tablet Take 1 tablet by mouth daily 90 tablet 2    ipratropium (ATROVENT) 0.06 % nasal spray 2 sprays by Nasal route 2 times daily (Patient taking differently: 2 sprays by Nasal route 2 times daily as needed ) 3 Bottle 3     No current facility-administered medications on file prior to visit. Pertinent items are noted in HPI  Review of systems reviewed from Patient History Form and available in the patient's chart under the Media tab. No change noted. PHYSICAL EXAMINATION:  Ms. Lai Faulkner is a very pleasant 61 y.o.  female who presents today in no acute distress, awake, alert, and oriented. She is well dressed, nourished and  groomed. Patient with normal affect. Height is  5' 9\" (1.753 m), weight is 147 lb (66.7 kg), Body mass index is 21.71 kg/m². Resting respiratory rate is 16. The patient walks with no limp.  the incision is healing nicely. No signs of any erythema or drainage, with minimal to no swelling. No pain with big toe ROM, but decreased range of motion of the great toe MTP joint. She has intact sensation in the right foot. Ankle reflex 1+ bilaterally. Good strength, and no instability both upper and lower extremities. IMAGING:  X-rays were taken in the office today, 3 views of the right foot, and showed the cheilectomy, and the removal of dorsal osteophyte. Hallux rigidus. IMPRESSION:  3 months out from right Hallux rigidus cheilectomy, with hallux rigidus    PLAN:   She will be full weightbearing, and continue to work on ROM of 1st MP joint.  I told her about the advanced arthritis of her joint that was seen in surgery. I discussed with her that she may benefit from a cortisone injection in her right great toe MTP joint and she agreed to proceed. She will come back in 3 months or as needed. PROCEDURE:    With the patient's permission, and under sterile condition, the right big toe MP joint area was prepared and draped with alcohol and injected with a mixture of 1 ml Kenalog 40mg and 1 ml of 1% lidocaine. The patient tolerated the procedure well. A band-aid was applied and the patient was advised to ice the big toe for 15-20 minutes to relieve any injection site related pain. She reported a good improvement immediatly after the injection.       Kala Elam MD

## 2022-05-10 DIAGNOSIS — F41.9 ANXIETY: ICD-10-CM

## 2022-05-10 DIAGNOSIS — G43.009 MIGRAINE WITHOUT AURA AND WITHOUT STATUS MIGRAINOSUS, NOT INTRACTABLE: ICD-10-CM

## 2022-05-10 RX ORDER — ESCITALOPRAM OXALATE 10 MG/1
10 TABLET ORAL DAILY
Qty: 90 TABLET | Refills: 1 | Status: SHIPPED | OUTPATIENT
Start: 2022-05-10 | End: 2022-05-12 | Stop reason: SDUPTHER

## 2022-05-10 RX ORDER — BUTALBITAL, ACETAMINOPHEN AND CAFFEINE 50; 325; 40 MG/1; MG/1; MG/1
TABLET ORAL
Qty: 30 TABLET | Refills: 1 | Status: SHIPPED | OUTPATIENT
Start: 2022-05-10 | End: 2022-05-12 | Stop reason: SDUPTHER

## 2022-05-10 NOTE — TELEPHONE ENCOUNTER
Requested Prescriptions     Pending Prescriptions Disp Refills    butalbital-acetaminophen-caffeine (FIORICET, ESGIC) -40 MG per tablet 30 tablet 1     Sig: TAKE ONE TABLET BY MOUTH EVERY 4 HOURS AS NEEDED FOR HEADACHE    escitalopram (LEXAPRO) 10 MG tablet 90 tablet 2     Sig: Take 1 tablet by mouth daily     Last ov 11/24/21  Last lab 6/4/21  Next ov 5/12/22

## 2022-05-10 NOTE — TELEPHONE ENCOUNTER
Felipe Cota is asking for the following refills for the patient:    butalbital-acetaminophen-caffeine Gifty Shell ESGIC) -40 MG per tablet [[id number]]     Order Details  Dose, Route, Frequency: As Directed   Dispense Quantity: 30 tablet Refills: 1          Sig: TAKE ONE TABLET BY MOUTH EVERY 4 HOURS AS NEEDED FOR HEADACHE         Start Date: 08/10/21 End Date: --   Written Date: 08/10/21 Expiration Date: 08/10/22       Diagnosis Association: Migraine without aura and without status migrainosus, not intractable (G43.009)   Original Order:  butalbital-acetaminophen-caffeine (FIORICET) -40 MG per tablet [[id number]       escitalopram (LEXAPRO) 10 MG tablet [[id number]]     Order Details  Dose: 10 mg Route: Oral Frequency: DAILY   Dispense Quantity: 90 tablet Refills: 2          Sig: Take 1 tablet by mouth daily         Start Date: 06/02/21 End Date: --   Written Date: 06/02/21 Expiration Date: 06/02/22       Diagnosis Association: Anxiety (F41.9)   Original Order:  escitalopram (LEXAPRO) 10 MG tablet [[id number]]     Call [facility] with any questions at [phone]. Last ov: 11/24/2021  Last labs: 06/04/2021.

## 2022-05-11 NOTE — TELEPHONE ENCOUNTER
Durga from [employer] calling about:    ipratropium (ATROVENT) 0.06 % nasal spray [[id number]]     Order Details  Dose: 2 spray Route: Nasal Frequency: 2 TIMES DAILY   Dispense Quantity: 3 Bottle Refills: 3          Si sprays by Nasal route 2 times daily   Patient taking differently: 2 sprays by Nasal route 2 times daily as needed          Start Date: 21 End Date: 22 after 730 doses   Written Date: 21 Expiration Date: 22       Diagnosis Association: Vasomotor rhinitis (J30.0)   Original Order:  ipratropium (ATROVENT) 0.06 % nasal spray [[id number]]       They are calling to get an approval for this medication since it was a transfer from [facility]. [facility] # [phone]. Thank you!

## 2022-05-12 ENCOUNTER — OFFICE VISIT (OUTPATIENT)
Dept: FAMILY MEDICINE CLINIC | Age: 57
End: 2022-05-12
Payer: COMMERCIAL

## 2022-05-12 VITALS
HEART RATE: 75 BPM | OXYGEN SATURATION: 100 % | RESPIRATION RATE: 14 BRPM | WEIGHT: 148.2 LBS | TEMPERATURE: 98.1 F | DIASTOLIC BLOOD PRESSURE: 76 MMHG | BODY MASS INDEX: 21.89 KG/M2 | SYSTOLIC BLOOD PRESSURE: 102 MMHG

## 2022-05-12 DIAGNOSIS — Z12.11 COLON CANCER SCREENING: ICD-10-CM

## 2022-05-12 DIAGNOSIS — F41.9 ANXIETY: Primary | ICD-10-CM

## 2022-05-12 DIAGNOSIS — G47.9 SLEEP DIFFICULTIES: ICD-10-CM

## 2022-05-12 DIAGNOSIS — Z23 NEED FOR DIPHTHERIA-TETANUS-PERTUSSIS (TDAP) VACCINE: ICD-10-CM

## 2022-05-12 DIAGNOSIS — Z78.0 MENOPAUSE: ICD-10-CM

## 2022-05-12 DIAGNOSIS — J30.0 VASOMOTOR RHINITIS: ICD-10-CM

## 2022-05-12 DIAGNOSIS — Z13.220 SCREENING CHOLESTEROL LEVEL: ICD-10-CM

## 2022-05-12 DIAGNOSIS — G43.009 MIGRAINE WITHOUT AURA AND WITHOUT STATUS MIGRAINOSUS, NOT INTRACTABLE: ICD-10-CM

## 2022-05-12 PROBLEM — Z80.0 FAMILY HISTORY OF COLON CANCER: Status: ACTIVE | Noted: 2022-05-12

## 2022-05-12 PROCEDURE — 99214 OFFICE O/P EST MOD 30 MIN: CPT | Performed by: FAMILY MEDICINE

## 2022-05-12 PROCEDURE — 90471 IMMUNIZATION ADMIN: CPT | Performed by: FAMILY MEDICINE

## 2022-05-12 PROCEDURE — 90715 TDAP VACCINE 7 YRS/> IM: CPT | Performed by: FAMILY MEDICINE

## 2022-05-12 RX ORDER — ESCITALOPRAM OXALATE 10 MG/1
10 TABLET ORAL DAILY
Qty: 90 TABLET | Refills: 1 | Status: SHIPPED | OUTPATIENT
Start: 2022-05-12 | End: 2022-07-13 | Stop reason: DRUGHIGH

## 2022-05-12 RX ORDER — IPRATROPIUM BROMIDE 42 UG/1
2 SPRAY, METERED NASAL 2 TIMES DAILY
Qty: 3 EACH | Refills: 3 | Status: SHIPPED | OUTPATIENT
Start: 2022-05-12 | End: 2023-05-12

## 2022-05-12 RX ORDER — BUTALBITAL, ACETAMINOPHEN AND CAFFEINE 50; 325; 40 MG/1; MG/1; MG/1
TABLET ORAL
Qty: 30 TABLET | Refills: 1 | Status: SHIPPED | OUTPATIENT
Start: 2022-05-12 | End: 2022-09-07 | Stop reason: SDUPTHER

## 2022-05-12 RX ORDER — ESTRADIOL AND NORETHINDRONE ACETATE 1; .5 MG/1; MG/1
TABLET ORAL
Qty: 84 TABLET | Refills: 2 | Status: SHIPPED | OUTPATIENT
Start: 2022-05-12

## 2022-05-12 RX ORDER — HYDROXYZINE HYDROCHLORIDE 25 MG/1
25 TABLET, FILM COATED ORAL EVERY 6 HOURS PRN
Qty: 90 TABLET | Refills: 1 | Status: SHIPPED | OUTPATIENT
Start: 2022-05-12 | End: 2022-07-14

## 2022-05-12 ASSESSMENT — PATIENT HEALTH QUESTIONNAIRE - PHQ9
SUM OF ALL RESPONSES TO PHQ QUESTIONS 1-9: 0
SUM OF ALL RESPONSES TO PHQ9 QUESTIONS 1 & 2: 0
2. FEELING DOWN, DEPRESSED OR HOPELESS: 0
SUM OF ALL RESPONSES TO PHQ QUESTIONS 1-9: 0
SUM OF ALL RESPONSES TO PHQ QUESTIONS 1-9: 0
1. LITTLE INTEREST OR PLEASURE IN DOING THINGS: 0
SUM OF ALL RESPONSES TO PHQ QUESTIONS 1-9: 0

## 2022-05-12 NOTE — PROGRESS NOTES
Patient is here for follow up follow up of Migraine headache ,  Anxiety and sleeping difficulties. She takes Hydroxyzine for anxiety / sleeping difficulties prn - 2-3 times per week. Taking Lexapro 10 mg qd. Mood is okay. She takes Fioricet sporadically for migraines. Atrovent helps with vasomotor rhinitis. Denies fever, chest pain , shortness of breath or cough. Review of Systems    ROS: All other systems were reviewed and are negative . Patient's allergies and medications were reviewed. Patient's past medical, surgical, social , and family history were reviewed. OBJECTIVE:  /76   Pulse 75   Temp 98.1 °F (36.7 °C) (Temporal)   Resp 14   Wt 148 lb 3.2 oz (67.2 kg)   LMP 10/31/2013   SpO2 100%   BMI 21.89 kg/m²     Physical Exam    General: NAD, cooperative, alert and oriented X 3. Mood / affect is good. good insight. well hydrated. Neck : no lymphadenopathy, supple, FROM  CV: Regular rate and rhythm , no murmurs/ rub/ gallop. No edema. Lungs : CTA bilaterally, breathing comfortably  Abdomen: positive bowel sounds, soft , non tender, non distended. No hepatosplenomegaly. No CVA tenderness. Skin: no rashes. Non tender. ASSESSMENT/  PLAN:  1. Anxiety  - Stable. Continue Lexapro 10 mg qd and Atarax prn.  - hydrOXYzine (ATARAX) 25 MG tablet; Take 1 tablet by mouth every 6 hours as needed for Anxiety (sleeping difficulties)  Dispense: 90 tablet; Refill: 1  - escitalopram (LEXAPRO) 10 MG tablet; Take 1 tablet by mouth daily  Dispense: 90 tablet; Refill: 1  - Comprehensive Metabolic Panel; Future  - CBC; Future  - TSH; Future    2. Sleep difficulties  - Sleep hygiene recommended. Atarax prn.   - hydrOXYzine (ATARAX) 25 MG tablet; Take 1 tablet by mouth every 6 hours as needed for Anxiety (sleeping difficulties)  Dispense: 90 tablet; Refill: 1    3. Migraine without aura and without status migrainosus, not intractable  - Stable. Fioricet prn sparingly.    - butalbital-acetaminophen-caffeine (FIORICET, ESGIC) -40 MG per tablet; TAKE ONE TABLET BY MOUTH EVERY 4 HOURS AS NEEDED FOR HEADACHE  Dispense: 30 tablet; Refill: 1    4. Menopause  - Stable. Side effects of hormonal replacement therapy discussed, including risk of stroke, pulmonary embolism , CV event, deep vein thrombosis , hypertension, headaches, and weight gain. - estradiol-norethindrone (ACTIVELLA) 1-0.5 MG per tablet; TAKE 1 TABLET BY MOUTH ONE TIME A DAY  Dispense: 84 tablet; Refill: 2    5. Vasomotor rhinitis  - Stable. - ipratropium (ATROVENT) 0.06 % nasal spray; 2 sprays by Nasal route 2 times daily  Dispense: 3 each; Refill: 3    6. Screening cholesterol level  - Lipid Panel; Future    7. Colon cancer screening  - Follow up with Gastroenterology as likely due for repeat colonoscopy. 8. Need for diphtheria-tetanus-pertussis (Tdap) vaccine  - Tdap (age 6y and older)  ([facility])     Follow up 6 months/ prn.

## 2022-05-17 NOTE — TELEPHONE ENCOUNTER
lvm for pt to call back and Message sent through [facility]

## 2022-06-29 ENCOUNTER — PATIENT MESSAGE (OUTPATIENT)
Dept: FAMILY MEDICINE CLINIC | Age: 57
End: 2022-06-29

## 2022-06-29 NOTE — TELEPHONE ENCOUNTER
She can increase Lexapro to 20 mg qd and needs a follow up in 3-4 weeks. In regard to other questions , including Xanax prescription an appointment is needed. Please facilitate scheduling .

## 2022-06-29 NOTE — TELEPHONE ENCOUNTER
From: Cleo Batres  To: Dr. Fabrizio Bell: 6/29/2022 1:17 PM EDT  Subject: Prescriptions     Hi Dr. Brandon Beckford - Im under a lot of professional and personal stress right now and not managing through it well. I think Im low-grade depressed. I was wondering if I should increase my lexapro from 10mg. Im also not sleeping which doesnt help and wondered if Xanax was an option (a friend said this helps her). Im also out of my cyclobenzaprine HCL prescription. Hui Botello been taking it since the stress is causing spasms and stiffness on the left side of my neck, shoulder and arm. Appreciate your advice.

## 2022-06-30 RX ORDER — ESCITALOPRAM OXALATE 20 MG/1
20 TABLET ORAL DAILY
Qty: 90 TABLET | Refills: 0 | Status: SHIPPED | OUTPATIENT
Start: 2022-06-30 | End: 2022-09-07 | Stop reason: SDUPTHER

## 2022-06-30 NOTE — TELEPHONE ENCOUNTER
Called patient left VM to return call to office , if patient calls back please verify what pharmacy she wants RX sent to.

## 2022-06-30 NOTE — TELEPHONE ENCOUNTER
Can an RX be sent in for the 20MG so the pt has enough? Per the pt It would be a 3 month mail order.     Thank you

## 2022-06-30 NOTE — TELEPHONE ENCOUNTER
I set up RX for Lexapro 20 mg qd, but to which pharmacy does it need to be sent. Please verify with the patient . [facility]?

## 2022-07-13 ENCOUNTER — OFFICE VISIT (OUTPATIENT)
Dept: FAMILY MEDICINE CLINIC | Age: 57
End: 2022-07-13
Payer: COMMERCIAL

## 2022-07-13 VITALS
TEMPERATURE: 96.8 F | SYSTOLIC BLOOD PRESSURE: 123 MMHG | DIASTOLIC BLOOD PRESSURE: 79 MMHG | OXYGEN SATURATION: 100 % | HEART RATE: 87 BPM

## 2022-07-13 DIAGNOSIS — F41.9 ANXIETY: ICD-10-CM

## 2022-07-13 DIAGNOSIS — M50.30 DDD (DEGENERATIVE DISC DISEASE), CERVICAL: ICD-10-CM

## 2022-07-13 DIAGNOSIS — E78.00 HYPERCHOLESTEREMIA: ICD-10-CM

## 2022-07-13 DIAGNOSIS — S46.812A STRAIN OF LEFT TRAPEZIUS MUSCLE, INITIAL ENCOUNTER: Primary | ICD-10-CM

## 2022-07-13 PROCEDURE — 99214 OFFICE O/P EST MOD 30 MIN: CPT | Performed by: FAMILY MEDICINE

## 2022-07-13 RX ORDER — ALPRAZOLAM 0.5 MG/1
0.5 TABLET ORAL 3 TIMES DAILY PRN
Qty: 30 TABLET | Refills: 0 | Status: SHIPPED | OUTPATIENT
Start: 2022-07-13 | End: 2022-09-07 | Stop reason: SDUPTHER

## 2022-07-13 RX ORDER — CYCLOBENZAPRINE HCL 10 MG
10 TABLET ORAL 3 TIMES DAILY PRN
Qty: 30 TABLET | Refills: 0 | Status: SHIPPED | OUTPATIENT
Start: 2022-07-13 | End: 2022-07-23

## 2022-07-13 SDOH — ECONOMIC STABILITY: FOOD INSECURITY: WITHIN THE PAST 12 MONTHS, YOU WORRIED THAT YOUR FOOD WOULD RUN OUT BEFORE YOU GOT MONEY TO BUY MORE.: NEVER TRUE

## 2022-07-13 SDOH — ECONOMIC STABILITY: FOOD INSECURITY: WITHIN THE PAST 12 MONTHS, THE FOOD YOU BOUGHT JUST DIDN'T LAST AND YOU DIDN'T HAVE MONEY TO GET MORE.: NEVER TRUE

## 2022-07-13 ASSESSMENT — SOCIAL DETERMINANTS OF HEALTH (SDOH): HOW HARD IS IT FOR YOU TO PAY FOR THE VERY BASICS LIKE FOOD, HOUSING, MEDICAL CARE, AND HEATING?: NOT VERY HARD

## 2022-07-13 ASSESSMENT — PATIENT HEALTH QUESTIONNAIRE - PHQ9
1. LITTLE INTEREST OR PLEASURE IN DOING THINGS: 0
SUM OF ALL RESPONSES TO PHQ QUESTIONS 1-9: 0
SUM OF ALL RESPONSES TO PHQ9 QUESTIONS 1 & 2: 0
SUM OF ALL RESPONSES TO PHQ QUESTIONS 1-9: 0
2. FEELING DOWN, DEPRESSED OR HOPELESS: 0
SUM OF ALL RESPONSES TO PHQ QUESTIONS 1-9: 0
SUM OF ALL RESPONSES TO PHQ QUESTIONS 1-9: 0

## 2022-07-13 NOTE — PROGRESS NOTES
Patient is here for left sided neck pain  Off and on for several months. .  She is having stress with personal and work. She is having problems falling asleep mostly . She is taking Lexapro 20 mg qd X 2-3 weeks . She is feeling it is helping. Mood is at 4/10 . She has never taken Xanax, but is inquiring about it via a friend. The Hydroxyzine is not helping with sleep . Anxiety is better during the day with the increased Lexapro. Exercising sporadically but does walk her dog. No weakness to LUE ,  Mild radiation to left shoulder and left neck. Right handed. She takes Naproxen sporadically and was given for her foot which is better. Review of Systems    ROS: All other systems were reviewed and are negative . Patient's allergies and medications were reviewed. Patient's past medical, surgical, social , and family history were reviewed. OBJECTIVE:  /79   Pulse 87   Temp 96.8 °F (36 °C)   LMP 10/31/2013   SpO2 100%     Physical Exam    General: NAD, cooperative, alert and oriented X 3. Mood / affect is good. good insight. well hydrated. Neck : no lymphadenopathy, supple, FROM- but some pain with hyperextension. Tenderness to left trapezius. Upper extremities : DTRs 2+ biceps/ triceps/ brachioradialis bilateral.  FROM. Strength 5/5. No pain with resistance of rotator cuff bilateral.  CV: Regular rate and rhythm , no murmurs/ rub/ gallop. No edema. Lungs : CTA bilaterally, breathing comfortably  Abdomen: positive bowel sounds, soft , non tender, non distended. No hepatosplenomegaly. No CVA tenderness. Skin: no rashes. Non tender. ASSESSMENT/  PLAN:  1. Strain of left trapezius muscle, initial encounter  - Moist heat . ROM exercises- handout given. Modify activities. - Naproxen bid prn.    - cyclobenzaprine (FLEXERIL) 10 MG tablet; Take 1 tablet by mouth 3 times daily as needed for Muscle spasms  Dispense: 30 tablet; Refill: 0.  Side effects of medication discussed including sedation

## 2022-07-14 DIAGNOSIS — G47.9 SLEEP DIFFICULTIES: ICD-10-CM

## 2022-07-14 DIAGNOSIS — F41.9 ANXIETY: ICD-10-CM

## 2022-07-14 RX ORDER — HYDROXYZINE HYDROCHLORIDE 25 MG/1
TABLET, FILM COATED ORAL
Qty: 90 TABLET | Refills: 1 | Status: SHIPPED | OUTPATIENT
Start: 2022-07-14

## 2022-07-14 NOTE — TELEPHONE ENCOUNTER
Requested Prescriptions     Pending Prescriptions Disp Refills    hydrOXYzine HCl (ATARAX) 25 MG tablet [Pharmacy Med Name: HYDROXYZINE HCL TABS 25MG] 90 tablet 14     Sig: TAKE 1 TABLET EVERY 6 HOURS AS NEEDED FOR ANXIETY (SLEEPING DIFFICULTIES)     Last OV-7/13/2022  Labs- 6/4/21  NFOV

## 2022-07-21 DIAGNOSIS — F41.9 ANXIETY: ICD-10-CM

## 2022-07-21 DIAGNOSIS — Z01.818 PREOP EXAMINATION: ICD-10-CM

## 2022-07-21 DIAGNOSIS — Z13.220 SCREENING CHOLESTEROL LEVEL: ICD-10-CM

## 2022-07-21 LAB
HCT VFR BLD CALC: 47.7 % (ref 36–48)
HEMOGLOBIN: 16.9 G/DL (ref 12–16)
MCH RBC QN AUTO: 33.1 PG (ref 26–34)
MCHC RBC AUTO-ENTMCNC: 35.5 G/DL (ref 31–36)
MCV RBC AUTO: 93.2 FL (ref 80–100)
PDW BLD-RTO: 12.6 % (ref 12.4–15.4)
PLATELET # BLD: 211 K/UL (ref 135–450)
PMV BLD AUTO: 8.2 FL (ref 5–10.5)
RBC # BLD: 5.12 M/UL (ref 4–5.2)
TSH SERPL DL<=0.05 MIU/L-ACNC: 0.99 UIU/ML (ref 0.27–4.2)
WBC # BLD: 4.9 K/UL (ref 4–11)

## 2022-08-26 ENCOUNTER — OFFICE VISIT (OUTPATIENT)
Dept: DERMATOLOGY | Age: 57
End: 2022-08-26
Payer: COMMERCIAL

## 2022-08-26 DIAGNOSIS — D22.5 MULTIPLE BENIGN MELANOCYTIC NEVI OF UPPER EXTREMITY, LOWER EXTREMITY, AND TRUNK: ICD-10-CM

## 2022-08-26 DIAGNOSIS — L90.5 SCAR: ICD-10-CM

## 2022-08-26 DIAGNOSIS — D22.60 MULTIPLE BENIGN MELANOCYTIC NEVI OF UPPER EXTREMITY, LOWER EXTREMITY, AND TRUNK: ICD-10-CM

## 2022-08-26 DIAGNOSIS — L82.0 INFLAMED SEBORRHEIC KERATOSIS: ICD-10-CM

## 2022-08-26 DIAGNOSIS — F45.8 NEUROPATHIC PRURITUS: Primary | ICD-10-CM

## 2022-08-26 DIAGNOSIS — Z85.828 HISTORY OF NONMELANOMA SKIN CANCER: ICD-10-CM

## 2022-08-26 DIAGNOSIS — D18.01 CHERRY ANGIOMA: ICD-10-CM

## 2022-08-26 DIAGNOSIS — D22.70 MULTIPLE BENIGN MELANOCYTIC NEVI OF UPPER EXTREMITY, LOWER EXTREMITY, AND TRUNK: ICD-10-CM

## 2022-08-26 DIAGNOSIS — L81.4 LENTIGINES: ICD-10-CM

## 2022-08-26 DIAGNOSIS — Z85.820 HISTORY OF MELANOMA: ICD-10-CM

## 2022-08-26 PROCEDURE — 99214 OFFICE O/P EST MOD 30 MIN: CPT | Performed by: INTERNAL MEDICINE

## 2022-08-26 PROCEDURE — 17110 DESTRUCTION B9 LES UP TO 14: CPT | Performed by: INTERNAL MEDICINE

## 2022-08-26 RX ORDER — TRIAMCINOLONE ACETONIDE 1 MG/G
CREAM TOPICAL
Qty: 80 G | Refills: 2 | Status: SHIPPED | OUTPATIENT
Start: 2022-08-26

## 2022-08-26 NOTE — PROGRESS NOTES
[facility]  Toribio Fernandes MD  [phone]    Date of Visit: 8/26/2022  LV 8/2021 Juanjoluther Garcia is a 61 y.o. female who presents for skin lesions. Chief Complaint:   Chief Complaint   Patient presents with    Skin Exam     Rash on rt. Arm that comes and goes- former patient of Dr. Maggie Godinez        History of Present Illness:    Concern: Bump on L ankle  Duration: Many months  Symptoms: Itchy, bothersome  Previous treatments: None   Other history: None    Concerns: Itchiness on R upper arm  Duration: Many months  Sx: Itchy, worse at night. Sometimes bumps   Previous trmt None     Patient denies any other new or changing skin lesions. They would like all of their skin lesions to be evaluated for skin cancer. Skin cancer:  -BCC on chest and stomach s/p excision 10 years ago  -R thigh melanoma at age 26 years s/p excision and negative LAD dissection     Past Dermatologic History:  Personal history of non melanoma skin cancer: Yes   Personal history of melanoma: Yes  Personal history of abnormal/dysplastic moles: Yes  Personal history of tanning bed use current: No  Personal history of tanning bed use: Yes  Personal history of blistering sunburns: No  Personal history of extensive sun exposure: No  Burns easily: No  Practicing sun protective habits:  Yes using sunscreen SPF  15-30     Review of Systems:  Gen: Feels well, good sense of health. Skin: No new or changing moles, no history of keloids or hypertrophic scars. Heme: No abnormal bruising or bleeding. Past Medical History, Family History, Surgical History, Medications and Allergies reviewed.     Past Medical History:   Diagnosis Date    Anxiety     Basal cell carcinoma     chest , stomach    DDD (degenerative disc disease), cervical     L4-5; L5-S1    Family history of colon cancer     MGM in mid 62s    History of melanoma 2000    right thigh     Infertility, female     Lumbar foraminal stenosis     Melanoma ([facility] [address] Menopause ovarian failure     Menorrhagia     Migraine     Ovarian cyst      Past Surgical History:   Procedure Laterality Date    BACK SURGERY  2000    X 4    BREAST ENHANCEMENT SURGERY      COLONOSCOPY N/A 8/11/2020    COLONOSCOPY POLYPECTOMY SNARE/COLD BIOPSY performed by Sabino Meng MD at [facility]  2013    FOOT SURGERY Left     FOOT SURGERY Right 11/4/2021    RIGHT GREAT TOE CHEILECTOMY performed by Chela Tony MD at [address] [place] [address]      internal hemorrhoid banding- Dr. Renetta Elam in early 26ths       Allergies   Allergen Reactions    Iodine Hives    Other      XRAY DYE     Outpatient Medications Marked as Taking for the 8/26/22 encounter (Office Visit) with Kristian Guzman MD   Medication Sig Dispense Refill    hydrOXYzine HCl (ATARAX) 25 MG tablet TAKE 1 TABLET EVERY 6 HOURS AS NEEDED FOR ANXIETY (SLEEPING DIFFICULTIES) 90 tablet 1    escitalopram (LEXAPRO) 20 MG tablet Take 1 tablet by mouth daily 90 tablet 0    butalbital-acetaminophen-caffeine (FIORICET, ESGIC) -40 MG per tablet TAKE ONE TABLET BY MOUTH EVERY 4 HOURS AS NEEDED FOR HEADACHE 30 tablet 1    estradiol-norethindrone (ACTIVELLA) 1-0.5 MG per tablet TAKE 1 TABLET BY MOUTH ONE TIME A DAY 84 tablet 2    ipratropium (ATROVENT) 0.06 % nasal spray 2 sprays by Nasal route 2 times daily 3 each 3       Social History:  Occupation:  Worked in [employer], now recently changed jobs       Physical Examination     Full body skin exam was conducted to include the scalp, face, lips, lids/conjunctiva, ears, neck, chest, abdomen, back, buttock, right and left hands and forearms, right and left leg and feet and was normal with the following exceptions:   - No eruption noted on R upper arm today  -White stuck on papule on L ankle  - On trunk and bilateral upper and lower extremities, there are multiple well-circumscribed, homogenous tan to brown macules and papules   -

## 2022-08-26 NOTE — PATIENT INSTRUCTIONS
Thank you for visiting [facility] today! Please follow the instructions below as we discussed in clinic:      I froze a benign seborrheic keratosis on your left ankle  Start silicone sheets or gel to mole biopsy site    SUNSCREENS: UVA and UVB PROTECTION    Sunlight consists of two types of light that can cause or worsen most skin problems:    UVA (ultraviolet A)  UVB (ultraviolet B)   Causes brown spots/sun spots Causes skin cancer   Causes wrinkles Causes sunburn   Causes aging Responsible for tanning    Worsens rosacea Strongest in summer    Less variation with seasons Peak hours 10am-2pm   All year round  SPF rates UVB protection    All day strong    No rating system available     Passes through glass and clouds      *Sunscreens that block both UVA and UVB light contain:  Zinc Oxide (should contain at least 5% zinc oxide)  Titanium Dioxide  Parsol or Avobenzone (additives improve stability of Avobenzone)  There are other UVA blocking ingredients but they are not as complete as these three. Tips/Suggestions  1) Always use sunscreens with SPF of 30 or higher  2) Make sure the sunscreen has zinc oxide or other UVA block such as Helioplex or Anthelios in product  3) Remember there is no \"safe UV light:. ..so there is no such thing as a safe suntan. 4) Apply sunscreen daily (even in winter and on cloudy days) and reapply every 2 to 4 hours depending on activity. 5) Approximately one ounce (a shot glass) is necessary to adequately cover the entire body. 6) Approximately one teaspoon is necessary to adequately cover the face    *Face sunscreens we like: EltaMD UV Clear, Cerave AM facial moisturizer, La Roche-Posay  *Body sunscreens we like: Neutrogena, La Roche-Posay, Vanicream sunscreen SPF 35    Remember the best sunscreen is whichever one you will wear every day!

## 2022-09-07 ENCOUNTER — OFFICE VISIT (OUTPATIENT)
Dept: FAMILY MEDICINE CLINIC | Age: 57
End: 2022-09-07
Payer: COMMERCIAL

## 2022-09-07 VITALS
DIASTOLIC BLOOD PRESSURE: 80 MMHG | WEIGHT: 150 LBS | SYSTOLIC BLOOD PRESSURE: 122 MMHG | HEART RATE: 87 BPM | BODY MASS INDEX: 22.15 KG/M2 | TEMPERATURE: 97.3 F | RESPIRATION RATE: 23 BRPM | OXYGEN SATURATION: 100 %

## 2022-09-07 DIAGNOSIS — G43.009 MIGRAINE WITHOUT AURA AND WITHOUT STATUS MIGRAINOSUS, NOT INTRACTABLE: ICD-10-CM

## 2022-09-07 DIAGNOSIS — F41.9 ANXIETY: ICD-10-CM

## 2022-09-07 PROCEDURE — 99214 OFFICE O/P EST MOD 30 MIN: CPT | Performed by: FAMILY MEDICINE

## 2022-09-07 RX ORDER — ESCITALOPRAM OXALATE 20 MG/1
20 TABLET ORAL DAILY
Qty: 90 TABLET | Refills: 1 | Status: SHIPPED | OUTPATIENT
Start: 2022-09-07

## 2022-09-07 RX ORDER — BUTALBITAL, ACETAMINOPHEN AND CAFFEINE 50; 325; 40 MG/1; MG/1; MG/1
TABLET ORAL
Qty: 30 TABLET | Refills: 1 | Status: SHIPPED | OUTPATIENT
Start: 2022-09-07

## 2022-09-07 RX ORDER — ALPRAZOLAM 0.5 MG/1
0.5 TABLET ORAL 3 TIMES DAILY PRN
Qty: 30 TABLET | Refills: 2 | Status: SHIPPED | OUTPATIENT
Start: 2022-09-07 | End: 2022-10-07

## 2022-09-07 NOTE — PROGRESS NOTES
Patient is here for follow up of anxiety . She has tolerated the increase dose of Lexapro 20 mg qd and has been less weepy and irritable. Mood is 8/10 and was 4/10. Hydroxyzine did not help her with sleep. Xanax helps with sleep and does not make her drowsy the next am.  She was taking 1-2 po qhs and has noticed she is taking less with being on the increased Lexapro 20 mg dose longer. She will try to decrease Xanax dose. She has been taking Fioricet once per 2 weeks or so. Dad got Pedrito in 1/22 and is more irritable. Review of Systems    ROS: All other systems were reviewed and are negative . Patient's allergies and medications were reviewed. Patient's past medical, surgical, social , and family history were reviewed. OBJECTIVE:  /80   Pulse 87   Temp 97.3 °F (36.3 °C)   Resp 23   Wt 150 lb (68 kg)   LMP 10/31/2013   SpO2 100%   BMI 22.15 kg/m²     Physical Exam    General: NAD, cooperative, alert and oriented X 3. Mood / affect is good. good insight. well hydrated. Neck : no lymphadenopathy, supple, FROM  CV: Regular rate and rhythm , no murmurs/ rub/ gallop. No edema. Lungs : CTA bilaterally, breathing comfortably  Abdomen: positive bowel sounds, soft , non tender, non distended. No hepatosplenomegaly. No CVA tenderness. Skin: no rashes. Non tender. ASSESSMENT/  PLAN:  1. Anxiety  - Stable. Continue Lexparo 20 mg qd and Xanax prn   - ALPRAZolam (XANAX) 0.5 MG tablet; Take 1 tablet by mouth 3 times daily as needed for Sleep or Anxiety for up to 30 days. Dispense: 30 tablet; Refill: 2. Side effects of medication discussed including sedation and addiction potential.  - escitalopram (LEXAPRO) 20 MG tablet; Take 1 tablet by mouth daily  Dispense: 90 tablet;  Refill: 1  - Controlled Substance Monitoring:    Acute and Chronic Pain Monitoring:   RX Monitoring 9/7/2022   Periodic Controlled Substance Monitoring Possible medication side effects, risk of tolerance/dependence & alternative treatments discussed. ;No signs of potential drug abuse or diversion identified. 2. Migraine without aura and without status migrainosus, not intractable  - Stable. - butalbital-acetaminophen-caffeine (FIORICET, ESGIC) -40 MG per tablet; TAKE ONE TABLET BY MOUTH EVERY 4 HOURS AS NEEDED FOR HEADACHE  Dispense: 30 tablet; Refill: 1       Follow up 3-6 months/ prn.

## 2022-09-15 ENCOUNTER — E-VISIT (OUTPATIENT)
Dept: FAMILY MEDICINE CLINIC | Age: 57
End: 2022-09-15
Payer: COMMERCIAL

## 2022-09-15 DIAGNOSIS — H10.33 ACUTE BACTERIAL CONJUNCTIVITIS OF BOTH EYES: Primary | ICD-10-CM

## 2022-09-15 PROCEDURE — 99421 OL DIG E/M SVC 5-10 MIN: CPT | Performed by: FAMILY MEDICINE

## 2022-09-15 RX ORDER — SULFACETAMIDE SODIUM 100 MG/ML
2 SOLUTION/ DROPS OPHTHALMIC 4 TIMES DAILY
Qty: 10 ML | Refills: 0 | Status: SHIPPED | OUTPATIENT
Start: 2022-09-15 | End: 2022-09-22

## 2022-09-15 NOTE — PROGRESS NOTES
Patient's on line questionnaire was reviewed for this e-visit. Past medical history, surgical history, medications, and allergies were reviewed. ASSESSMENT/ PLAN:  1. Acute bacterial conjunctivitis of both eyes  - Warm compresses 4-5 x/day with clean cloth. - Avoid eye makeup and discard prior eye makeup recently used. - sulfacetamide (BLEPH-10) 10 % ophthalmic solution; Place 2 drops into both eyes 4 times daily for 7 days maximum. Dispense: 10 mL; Refill: 0    F/u if no improvement 4-5d/ prn increased symptoms.

## 2022-09-16 ENCOUNTER — TELEPHONE (OUTPATIENT)
Dept: DERMATOLOGY | Age: 57
End: 2022-09-16

## 2022-09-16 NOTE — TELEPHONE ENCOUNTER
Received a [employer] message today 9/16/2022 regarding medication    I had called [facility] and spoke with Emmy Espino stated medication was ready for  when received and that it was on the shelf for to many day and the medication was put back Emmanuel Donnelly also mentioned that the patient is due in today to  an eye drop he informed me to contact the patient and if she wanted to pick the medication up today to call ad let them know and they will fill it today     Call was returned to patient to let her know that the script was at [facility] and to call Emamnuel Donnelly to let him know that you are wanting to  the medication today.   Thanks Franci Like

## 2022-09-19 ENCOUNTER — TELEPHONE (OUTPATIENT)
Dept: FAMILY MEDICINE CLINIC | Age: 57
End: 2022-09-19

## 2022-09-19 NOTE — TELEPHONE ENCOUNTER
Given it was an e-visit last week and not improving, she should either follow up here or to I urgent care if not willing to come here. Okay to schedule today or tomorrow am - even 8 am tomorrow.

## 2022-09-19 NOTE — TELEPHONE ENCOUNTER
Patient states she was seen for an eye issue and was given eyedrops last week and its not working and wanted to know if she needed something stronger or need another appointment? I was trying to explain the process and she hung up. Patient states she sent a Mychart message too. Thanks.

## 2022-09-19 NOTE — TELEPHONE ENCOUNTER
----- Message from [facility] sent at 9/19/2022  2:28 PM EDT -----  Subject: Message to Provider    QUESTIONS  Information for Provider? patient called back in returning a call to   Sean Trinh , please follow up with the patient asap . she does want the appt   for tomorrow, please follow up to advise of the time.    ---------------------------------------------------------------------------  --------------  Saurabh WILLIS  [phone]; OK to leave message on voicemail, OK to respond with   electronic message via [employer] portal (only for patients who have   registered [employer] account)  ---------------------------------------------------------------------------  --------------  SCRIPT ANSWERS  undefined

## 2022-09-21 ENCOUNTER — OFFICE VISIT (OUTPATIENT)
Dept: FAMILY MEDICINE CLINIC | Age: 57
End: 2022-09-21
Payer: COMMERCIAL

## 2022-09-21 VITALS
WEIGHT: 148.6 LBS | RESPIRATION RATE: 18 BRPM | TEMPERATURE: 96.9 F | HEART RATE: 80 BPM | DIASTOLIC BLOOD PRESSURE: 70 MMHG | OXYGEN SATURATION: 99 % | BODY MASS INDEX: 21.94 KG/M2 | SYSTOLIC BLOOD PRESSURE: 100 MMHG

## 2022-09-21 DIAGNOSIS — E78.00 HYPERCHOLESTEREMIA: ICD-10-CM

## 2022-09-21 DIAGNOSIS — T48.5X5A RHINITIS MEDICAMENTOSA: ICD-10-CM

## 2022-09-21 DIAGNOSIS — J30.9 ALLERGIC RHINITIS, UNSPECIFIED SEASONALITY, UNSPECIFIED TRIGGER: ICD-10-CM

## 2022-09-21 DIAGNOSIS — H10.9 CONJUNCTIVITIS OF BOTH EYES, UNSPECIFIED CONJUNCTIVITIS TYPE: Primary | ICD-10-CM

## 2022-09-21 DIAGNOSIS — J31.0 RHINITIS MEDICAMENTOSA: ICD-10-CM

## 2022-09-21 PROCEDURE — 99214 OFFICE O/P EST MOD 30 MIN: CPT | Performed by: FAMILY MEDICINE

## 2022-09-21 RX ORDER — PREDNISONE 10 MG/1
TABLET ORAL
Qty: 40 TABLET | Refills: 0 | Status: SHIPPED | OUTPATIENT
Start: 2022-09-21 | End: 2022-10-01

## 2022-09-21 NOTE — PROGRESS NOTES
Patient is here for bilateral eye redness - right > left after being in [place] for 2 days. She was staying with a friend who had a dog - hypoallergenic. No sneezing . Mild nasal congestion and post nasal drip but not significantly different. She supposedly had negative allergies- skin test years ago. Review of Systems    ROS: All other systems were reviewed and are negative . Patient's allergies and medications were reviewed. Patient's past medical, surgical, social , and family history were reviewed. OBJECTIVE:  /70   Pulse 80   Temp 96.9 °F (36.1 °C)   Resp 18   Wt 148 lb 9.6 oz (67.4 kg)   LMP 10/31/2013   SpO2 99%   BMI 21.94 kg/m²     Physical Exam    General: NAD, cooperative, alert and oriented X 3. Mood / affect is good. good insight. well hydrated. HEENT: PERRLA, EOMI, mild erythema of conjunctiva bilateral. TMs - clear. Nasopharynx clear. Neck : no lymphadenopathy, supple, FROM  CV: Regular rate and rhythm , no murmurs/ rub/ gallop. No edema. Lungs : CTA bilaterally, breathing comfortably  Abdomen: positive bowel sounds, soft , non tender, non distended. No hepatosplenomegaly. No CVA tenderness. Skin: no rashes. Non tender. ASSESSMENT/  PLAN:  1. Conjunctivitis of both eyes, unspecified conjunctivitis type  - Stop Sulfacetamide 10% eye drops, due to likely aggravating eyes. - Warm compresses 4x/day. - If no improvement by tomorrow am, keep [facility] appointment at 8 am.   - Allergen, Respiratory, Region 5 Panel; Future    2. Allergic rhinitis, unspecified seasonality, unspecified trigger  - Allergen, Respiratory, Region 5 Panel; Future  - Start Flonase NS qhs.     3. Rhinitis medicamentosa  - predniSONE (DELTASONE) 10 MG tablet; Take 40 mgs by mouth for 3 days, then 30 mg for 3 days, then 20 mg for 3 days then 10 mg. Dispense: 40 tablet;  Refill: 0  - Stop Afrin.  - Start Flonase NS qhs.     4. Hypercholesteremia  - Follow a low cholesterol diet, including

## 2022-11-14 ENCOUNTER — OFFICE VISIT (OUTPATIENT)
Dept: FAMILY MEDICINE CLINIC | Age: 57
End: 2022-11-14
Payer: COMMERCIAL

## 2022-11-14 VITALS
HEIGHT: 69 IN | OXYGEN SATURATION: 98 % | TEMPERATURE: 96.9 F | SYSTOLIC BLOOD PRESSURE: 136 MMHG | DIASTOLIC BLOOD PRESSURE: 82 MMHG | BODY MASS INDEX: 22.42 KG/M2 | WEIGHT: 151.4 LBS | HEART RATE: 80 BPM

## 2022-11-14 DIAGNOSIS — M51.36 DDD (DEGENERATIVE DISC DISEASE), LUMBAR: ICD-10-CM

## 2022-11-14 DIAGNOSIS — M54.50 RIGHT-SIDED LOW BACK PAIN WITHOUT SCIATICA, UNSPECIFIED CHRONICITY: Primary | ICD-10-CM

## 2022-11-14 PROCEDURE — 99214 OFFICE O/P EST MOD 30 MIN: CPT | Performed by: FAMILY MEDICINE

## 2022-11-14 RX ORDER — DICLOFENAC SODIUM 75 MG/1
75 TABLET, DELAYED RELEASE ORAL 2 TIMES DAILY
Qty: 60 TABLET | Refills: 1 | Status: SHIPPED | OUTPATIENT
Start: 2022-11-14

## 2022-11-14 RX ORDER — CYCLOBENZAPRINE HCL 10 MG
10 TABLET ORAL 3 TIMES DAILY PRN
Qty: 25 TABLET | Refills: 0 | Status: SHIPPED | OUTPATIENT
Start: 2022-11-14 | End: 2022-11-24

## 2022-11-14 NOTE — PROGRESS NOTES
Patient is here for right low back pain X 1 week. She developed the pain while reaching for something on coffee table while sitting on couch. . No radiation or weakness to legs. Taking Advil 800 mg 1-2 times per day . No pain at rest.  1/30 with certain movements. H/o low back surgery X 4 with last being 2000. Sleeping relatively okay . Icing. She restarted stretches ,but she has not been doing stretches previoulsly , but has been through Physical Therapy before years ago. Improvement noted from when occurred . Review of Systems    ROS: All other systems were reviewed and are negative . Patient's allergies and medications were reviewed. Patient's past medical, surgical, social , and family history were reviewed. OBJECTIVE:  /82 (Site: Left Upper Arm, Position: Sitting, Cuff Size: Medium Adult)   Pulse 80   Temp 96.9 °F (36.1 °C) (Temporal)   Ht 5' 9\" (1.753 m)   Wt 151 lb 6.4 oz (68.7 kg)   LMP 10/31/2013   SpO2 98%   BMI 22.36 kg/m²     Physical Exam    General: NAD, cooperative, alert and oriented X 3. Mood / affect is good. good insight. well hydrated. Neck : no lymphadenopathy, supple, FROM  CV: Regular rate and rhythm , no murmurs/ rub/ gallop. No edema. Lungs : CTA bilaterally, breathing comfortably  Abdomen: positive bowel sounds, soft , non tender, non distended. No hepatosplenomegaly. No CVA tenderness. Back: pain with rotation and hyperextension. No significant pain with flexion. Minimal right latissimus dorsi tenderness. Lower extremities : DTRs 2+ knees and ankles bilateral.  FROM. Strength 5/5. Negative straight leg-raise. No edema or erythema bilateral. Mild pain with leg crossover bilateral.   Skin: no rashes. Non tender. ASSESSMENT/  PLAN:  1. Right-sided low back pain without sciatica, unspecified chronicity  - Moist heat . ROM exercises. Modify activities. - cyclobenzaprine (FLEXERIL) 10 MG tablet;  Take 1 tablet by mouth 3 times daily as needed for Muscle spasms  Dispense: 25 tablet; Refill: 0. Side effects of medication discussed including sedation and addiction potential.  - diclofenac (VOLTAREN) 75 MG EC tablet; Take 1 tablet by mouth 2 times daily  Dispense: 60 tablet; Refill: 1       2. DDD (degenerative disc disease), lumbar  - Moist heat . ROM exercises- handout given. Modify activities. - diclofenac (VOLTAREN) 75 MG EC tablet; Take 1 tablet by mouth 2 times daily  Dispense: 60 tablet; Refill: 1       Follow up if no improvement in 2- 4 weeks/ as needed for increased symptoms.

## 2022-12-01 DIAGNOSIS — Z78.0 MENOPAUSE: ICD-10-CM

## 2022-12-01 RX ORDER — ESTRADIOL AND NORETHINDRONE ACETATE 1; .5 MG/1; MG/1
TABLET ORAL
Qty: 84 TABLET | Refills: 1 | Status: SHIPPED | OUTPATIENT
Start: 2022-12-01

## 2022-12-01 NOTE — TELEPHONE ENCOUNTER
Requested Prescriptions     Pending Prescriptions Disp Refills    estradiol-norethindrone (ACTIVELLA) 1-0.5 MG per tablet 84 tablet 2     Sig: TAKE 1 TABLET BY MOUTH ONE TIME A DAY          Last Office Visit: 11/14/2022     Next Office Visit: Visit date not found     Last Labs: 7/21/22

## 2022-12-06 ENCOUNTER — PATIENT MESSAGE (OUTPATIENT)
Dept: FAMILY MEDICINE CLINIC | Age: 57
End: 2022-12-06

## 2022-12-06 NOTE — TELEPHONE ENCOUNTER
From: Norberto Batres  To: Dr. Sharon Pete: 12/6/2022 11:55 AM EST  Subject: Estradiol-Noreth    I picked up my refill but its only for 30-days. Can you call in a prescription for 90 days?

## 2022-12-06 NOTE — TELEPHONE ENCOUNTER
Pt was advised that we did send in a 90 days supply. The pharmacy will only hand out 30 days at a time sometimes with [facility].  She was advised to call her pharmacy to make sure that is what they are doing    Thank you

## 2022-12-19 DIAGNOSIS — G47.9 SLEEP DIFFICULTIES: ICD-10-CM

## 2022-12-19 DIAGNOSIS — F41.9 ANXIETY: ICD-10-CM

## 2022-12-19 RX ORDER — HYDROXYZINE HYDROCHLORIDE 25 MG/1
25 TABLET, FILM COATED ORAL EVERY 6 HOURS PRN
Qty: 90 TABLET | Refills: 1 | Status: SHIPPED | OUTPATIENT
Start: 2022-12-19

## 2022-12-19 NOTE — TELEPHONE ENCOUNTER
Requested Prescriptions     Pending Prescriptions Disp Refills    hydrOXYzine HCl (ATARAX) 25 MG tablet 90 tablet 1     Sig: Take 1 tablet by mouth every 6 hours as needed for Itching TAKE 1 TABLET EVERY 6 HOURS AS NEEDED FOR ANXIETY (SLEEPING DIFFICULTIES)          Last Office Visit: 11/14/2022     Next Office Visit: Visit date not found     Last Labs: 6/4/21

## 2022-12-27 ENCOUNTER — PATIENT MESSAGE (OUTPATIENT)
Dept: FAMILY MEDICINE CLINIC | Age: 57
End: 2022-12-27

## 2022-12-27 DIAGNOSIS — G47.9 SLEEP DIFFICULTIES: ICD-10-CM

## 2022-12-27 DIAGNOSIS — F41.9 ANXIETY: ICD-10-CM

## 2022-12-27 RX ORDER — HYDROXYZINE HYDROCHLORIDE 25 MG/1
25 TABLET, FILM COATED ORAL EVERY 6 HOURS PRN
Qty: 90 TABLET | Refills: 1 | Status: CANCELLED | OUTPATIENT
Start: 2022-12-27

## 2022-12-27 RX ORDER — HYDROXYZINE HYDROCHLORIDE 25 MG/1
25 TABLET, FILM COATED ORAL EVERY 6 HOURS PRN
Qty: 90 TABLET | Refills: 1 | Status: SHIPPED | OUTPATIENT
Start: 2022-12-27

## 2022-12-27 NOTE — TELEPHONE ENCOUNTER
From: Juwan Batres  To: Dr. Martinez April: 12/27/2022 1:21 PM EST  Subject: Prescription refill     Hello - my prescription refill for hydroxyzine was sent to [facility] and they canceled it since my Rx is no longer through them. i resubmitted the request to be sent to Mary Whitaker at [address] [place] [address] for the confusion. Thank you!

## 2022-12-27 NOTE — TELEPHONE ENCOUNTER
Requested Prescriptions     Pending Prescriptions Disp Refills    hydrOXYzine HCl (ATARAX) 25 MG tablet 90 tablet 1     Sig: Take 1 tablet by mouth every 6 hours as needed for Itching TAKE 1 TABLET EVERY 6 HOURS AS NEEDED FOR ANXIETY (SLEEPING DIFFICULTIES)     Hello - my prescription refill for hydroxyzine was sent to [facility] and they canceled it since my Rx is no longer through them. i resubmitted the request to be sent to Reginaldo Lopez at [address] [place] [address] for the confusion. Thank you!

## 2022-12-28 ENCOUNTER — PATIENT MESSAGE (OUTPATIENT)
Dept: FAMILY MEDICINE CLINIC | Age: 57
End: 2022-12-28

## 2022-12-29 NOTE — TELEPHONE ENCOUNTER
Hello do I need an appointment to have prescription refilled?  I received an auto reply which directed me to a link to send you a message

## 2022-12-29 NOTE — TELEPHONE ENCOUNTER
From: Kendall Batres  To: Dr. Merrill Days: 12/28/2022 5:12 PM EST  Subject: Prescription refill     [facility] do I need an appointment to have prescription refilled?  I received an auto reply which directed me to a link to send you a message

## 2023-01-27 ENCOUNTER — HOSPITAL ENCOUNTER (OUTPATIENT)
Dept: WOMENS IMAGING | Age: 58
Discharge: HOME | End: 2023-01-27
Payer: COMMERCIAL

## 2023-01-27 VITALS — HEIGHT: 70 IN | BODY MASS INDEX: 20.04 KG/M2 | WEIGHT: 140 LBS

## 2023-01-27 DIAGNOSIS — Z78.0 MENOPAUSE: ICD-10-CM

## 2023-01-27 DIAGNOSIS — Z12.31 VISIT FOR SCREENING MAMMOGRAM: ICD-10-CM

## 2023-01-27 PROCEDURE — 77067 SCR MAMMO BI INCL CAD: CPT

## 2023-01-27 RX ORDER — ESTRADIOL AND NORETHINDRONE ACETATE 1; .5 MG/1; MG/1
TABLET ORAL
Qty: 84 TABLET | Refills: 1 | OUTPATIENT
Start: 2023-01-27

## 2023-01-27 NOTE — TELEPHONE ENCOUNTER
Requested Prescriptions     Pending Prescriptions Disp Refills    estradiol-norethindrone (ACTIVELLA) 1-0.5 MG per tablet 84 tablet 1     Sig: TAKE 1 TABLET BY MOUTH ONE TIME A DAY     Last ov 11/14/22  Last lab 7/21/22  Next ov n/a

## 2023-02-17 DIAGNOSIS — J30.0 VASOMOTOR RHINITIS: ICD-10-CM

## 2023-02-20 RX ORDER — IPRATROPIUM BROMIDE 42 UG/1
2 SPRAY, METERED NASAL 2 TIMES DAILY
Qty: 3 EACH | Refills: 3 | Status: SHIPPED | OUTPATIENT
Start: 2023-02-20 | End: 2024-02-20

## 2023-03-07 DIAGNOSIS — F41.9 ANXIETY: ICD-10-CM

## 2023-03-07 RX ORDER — ESCITALOPRAM OXALATE 20 MG/1
TABLET ORAL
Qty: 90 TABLET | Refills: 1 | Status: SHIPPED | OUTPATIENT
Start: 2023-03-07

## 2023-03-07 NOTE — TELEPHONE ENCOUNTER
Requested Prescriptions     Pending Prescriptions Disp Refills    escitalopram (LEXAPRO) 20 MG tablet [Pharmacy Med Name: ESCITALOPRAM 20 MG TABLET] 90 tablet 1     Sig: TAKE ONE TABLET BY MOUTH DAILY     Last OV-11/14/2022  Labs- 7/21/22  [facility]

## 2023-04-03 ENCOUNTER — TELEPHONE (OUTPATIENT)
Dept: GYNECOLOGY | Age: 58
End: 2023-04-03

## 2023-04-03 ENCOUNTER — OFFICE VISIT (OUTPATIENT)
Dept: GYNECOLOGY | Age: 58
End: 2023-04-03

## 2023-04-03 VITALS — RESPIRATION RATE: 17 BRPM | BODY MASS INDEX: 19.47 KG/M2 | HEIGHT: 70 IN | WEIGHT: 136 LBS

## 2023-04-03 DIAGNOSIS — Z01.419 WELL WOMAN EXAM WITH ROUTINE GYNECOLOGICAL EXAM: Primary | ICD-10-CM

## 2023-04-03 DIAGNOSIS — N90.89 VULVAR LESION: ICD-10-CM

## 2023-04-03 DIAGNOSIS — Z78.0 MENOPAUSE: ICD-10-CM

## 2023-04-03 RX ORDER — VALACYCLOVIR HYDROCHLORIDE 500 MG/1
500 TABLET, FILM COATED ORAL 2 TIMES DAILY
Qty: 90 TABLET | Refills: 3 | Status: SHIPPED | OUTPATIENT
Start: 2023-04-03 | End: 2023-04-03 | Stop reason: CLARIF

## 2023-04-03 RX ORDER — VALACYCLOVIR HYDROCHLORIDE 1 G/1
1000 TABLET, FILM COATED ORAL 2 TIMES DAILY
Qty: 14 TABLET | Refills: 0 | Status: SHIPPED | OUTPATIENT
Start: 2023-04-03 | End: 2023-04-10

## 2023-04-03 RX ORDER — LIDOCAINE 50 MG/G
OINTMENT TOPICAL EVERY 4 HOURS PRN
Qty: 30 G | Refills: 3 | Status: SHIPPED | OUTPATIENT
Start: 2023-04-03

## 2023-04-03 RX ORDER — VALACYCLOVIR HYDROCHLORIDE 500 MG/1
500 TABLET, FILM COATED ORAL DAILY
Qty: 90 TABLET | Refills: 3 | Status: SHIPPED | OUTPATIENT
Start: 2023-04-03

## 2023-04-03 RX ORDER — ESTRADIOL AND NORETHINDRONE ACETATE 1; .5 MG/1; MG/1
TABLET ORAL
Qty: 84 TABLET | Refills: 3 | Status: SHIPPED | OUTPATIENT
Start: 2023-04-03

## 2023-04-03 NOTE — TELEPHONE ENCOUNTER
Patient has sores on her genitals. Says its very painful. It's even painful to sit. Requesting an emergency apt for today or tomorrow.    Patient says the sores have been present for a week.                   Patient can be reached at [phone]

## 2023-04-06 LAB
FINAL REPORT: NORMAL
PRELIMINARY: NORMAL

## 2023-04-07 ENCOUNTER — TELEPHONE (OUTPATIENT)
Dept: GYNECOLOGY | Age: 58
End: 2023-04-07

## 2023-04-07 LAB
FINAL REPORT: NORMAL
PRELIMINARY: NORMAL

## 2023-04-07 NOTE — TELEPHONE ENCOUNTER
Patient sent a [employer] message on 4/6/23 of wanting to her results of her test to covering physician then patient sent another my chart on 4/7/23 wanting to know results. I forwarded message to covering physician on 4/7/23. Covering physician read the results and asked that I contact the patient. Called and spoke to patient, gave her results. She said she has questions she would like to speak with Dr Jhoana Pena. She would like Dr Jhoana Pena to call her on Monday. Informed her that Dr Jhoana Pena will be seeing patients on Monday all day I cant give her a time of when Dr Jhoana Pena would reach out to contact her it could be at the end of her work day and etc. Patient felt that this was handled poorly.     Patient can be contacted at [phone]

## 2023-04-09 ASSESSMENT — ENCOUNTER SYMPTOMS
RESPIRATORY NEGATIVE: 1
EYES NEGATIVE: 1
BACK PAIN: 1
ALLERGIC/IMMUNOLOGIC NEGATIVE: 1
GASTROINTESTINAL NEGATIVE: 1

## 2023-04-09 NOTE — PROGRESS NOTES
guarding or rebound. Hernia: No hernia is present. There is no hernia in the left inguinal area. Genitourinary:     General: Normal vulva. Exam position: Lithotomy position. Pubic Area: No rash. Labia:         Right: Lesion present. No rash, tenderness or injury. Left: Lesion present. No rash, tenderness or injury. Urethra: No prolapse, urethral pain, urethral swelling or urethral lesion. Vagina: Normal. No signs of injury and foreign body. No vaginal discharge, erythema, tenderness or bleeding. Cervix: No cervical motion tenderness, discharge, friability, lesion, erythema, cervical bleeding or eversion. Uterus: Not deviated, not enlarged, not fixed, not tender and no uterine prolapse. Adnexa:         Right: No mass, tenderness or fullness. Left: No mass, tenderness or fullness. Rectum: Normal. Guaiac result negative. No mass, tenderness, anal fissure, external hemorrhoid or internal hemorrhoid. Normal anal tone. Comments: Normal urethral meatus, nl urethra, nl bladder. Multiple vesicular lesions along upper labia    Musculoskeletal:         General: No swelling, tenderness, deformity or signs of injury. Normal range of motion. Cervical back: Normal range of motion and neck supple. No rigidity. Lymphadenopathy:      Cervical: No cervical adenopathy. Lower Body: No right inguinal adenopathy. No left inguinal adenopathy. Skin:     General: Skin is warm and dry. Coloration: Skin is not jaundiced or pale. Findings: No bruising, erythema, lesion or rash. Neurological:      General: No focal deficit present. Mental Status: She is alert and oriented to person, place, and time. Deep Tendon Reflexes: Reflexes are normal and symmetric. Psychiatric:         Mood and Affect: Mood normal.         Behavior: Behavior normal.         Thought Content:  Thought content normal.         Judgment: Judgment normal.

## 2023-04-27 DIAGNOSIS — M54.50 RIGHT-SIDED LOW BACK PAIN WITHOUT SCIATICA, UNSPECIFIED CHRONICITY: ICD-10-CM

## 2023-04-27 DIAGNOSIS — G43.009 MIGRAINE WITHOUT AURA AND WITHOUT STATUS MIGRAINOSUS, NOT INTRACTABLE: ICD-10-CM

## 2023-04-27 RX ORDER — BUTALBITAL, ACETAMINOPHEN AND CAFFEINE 50; 325; 40 MG/1; MG/1; MG/1
TABLET ORAL
Qty: 30 TABLET | Refills: 1 | OUTPATIENT
Start: 2023-04-27

## 2023-04-27 RX ORDER — DICLOFENAC SODIUM 75 MG/1
75 TABLET, DELAYED RELEASE ORAL 2 TIMES DAILY
Qty: 60 TABLET | Refills: 1 | OUTPATIENT
Start: 2023-04-27

## 2023-05-02 DIAGNOSIS — J30.0 VASOMOTOR RHINITIS: ICD-10-CM

## 2023-05-03 RX ORDER — IPRATROPIUM BROMIDE 42 UG/1
2 SPRAY, METERED NASAL 2 TIMES DAILY
Qty: 3 EACH | Refills: 3 | OUTPATIENT
Start: 2023-05-03 | End: 2024-05-02

## 2023-05-11 ENCOUNTER — OFFICE VISIT (OUTPATIENT)
Dept: PRIMARY CARE CLINIC | Age: 58
End: 2023-05-11

## 2023-05-11 VITALS
HEIGHT: 70 IN | DIASTOLIC BLOOD PRESSURE: 80 MMHG | BODY MASS INDEX: 22.28 KG/M2 | TEMPERATURE: 98.6 F | WEIGHT: 155.6 LBS | SYSTOLIC BLOOD PRESSURE: 102 MMHG | RESPIRATION RATE: 14 BRPM

## 2023-05-11 DIAGNOSIS — N95.1 VASOMOTOR SYMPTOMS DUE TO MENOPAUSE: ICD-10-CM

## 2023-05-11 DIAGNOSIS — Z00.00 PERIODIC HEALTH ASSESSMENT, GENERAL SCREENING, ADULT: ICD-10-CM

## 2023-05-11 DIAGNOSIS — Z02.89 MEDICATION MANAGEMENT CONTRACT AGREEMENT: ICD-10-CM

## 2023-05-11 DIAGNOSIS — Z13.1 DIABETES MELLITUS SCREENING: ICD-10-CM

## 2023-05-11 DIAGNOSIS — Z13.29 THYROID DISORDER SCREENING: ICD-10-CM

## 2023-05-11 DIAGNOSIS — Z13.21 ENCOUNTER FOR VITAMIN DEFICIENCY SCREENING: ICD-10-CM

## 2023-05-11 DIAGNOSIS — Z13.220 LIPID SCREENING: ICD-10-CM

## 2023-05-11 DIAGNOSIS — G43.009 MIGRAINE WITHOUT AURA AND WITHOUT STATUS MIGRAINOSUS, NOT INTRACTABLE: ICD-10-CM

## 2023-05-11 DIAGNOSIS — J30.0 VASOMOTOR RHINITIS: ICD-10-CM

## 2023-05-11 DIAGNOSIS — F51.01 PRIMARY INSOMNIA: Primary | ICD-10-CM

## 2023-05-11 LAB
ALCOHOL URINE: NORMAL
AMPHETAMINE SCREEN, URINE: NEGATIVE
BARBITURATE SCREEN, URINE: NEGATIVE
BENZODIAZEPINE SCREEN, URINE: NEGATIVE
BUPRENORPHINE URINE: NEGATIVE
COCAINE METABOLITE SCREEN URINE: NEGATIVE
FENTANYL SCREEN, URINE: NEGATIVE
GABAPENTIN SCREEN, URINE: NORMAL
MDMA URINE: NEGATIVE
METHADONE SCREEN, URINE: NEGATIVE
METHAMPHETAMINE, URINE: NEGATIVE
OPIATE SCREEN URINE: NORMAL
OXYCODONE SCREEN URINE: POSITIVE
PHENCYCLIDINE SCREEN URINE: NEGATIVE
PROPOXYPHENE SCREEN, URINE: NORMAL
SYNTHETIC CANNABINOIDS(K2) SCREEN, URINE: NORMAL
THC SCREEN, URINE: NEGATIVE
TRAMADOL SCREEN URINE: NEGATIVE
TRICYCLIC ANTIDEPRESSANTS, UR: NORMAL

## 2023-05-11 RX ORDER — ALPRAZOLAM 0.5 MG/1
0.5 TABLET ORAL NIGHTLY PRN
Qty: 30 TABLET | Refills: 0 | Status: SHIPPED | OUTPATIENT
Start: 2023-05-11 | End: 2023-06-10

## 2023-05-11 RX ORDER — IPRATROPIUM BROMIDE 42 UG/1
2 SPRAY, METERED NASAL 2 TIMES DAILY
Qty: 3 EACH | Refills: 3 | Status: SHIPPED | OUTPATIENT
Start: 2023-05-11 | End: 2024-05-10

## 2023-05-11 RX ORDER — DICLOFENAC SODIUM 75 MG/1
75 TABLET, DELAYED RELEASE ORAL 2 TIMES DAILY
Qty: 60 TABLET | Refills: 1 | Status: CANCELLED | OUTPATIENT
Start: 2023-05-11

## 2023-05-11 RX ORDER — ONDANSETRON 4 MG/1
TABLET, FILM COATED ORAL
Qty: 15 TABLET | Refills: 1 | Status: SHIPPED | OUTPATIENT
Start: 2023-05-11

## 2023-05-11 RX ORDER — BUTALBITAL, ACETAMINOPHEN AND CAFFEINE 50; 325; 40 MG/1; MG/1; MG/1
TABLET ORAL
Qty: 30 TABLET | Refills: 1 | Status: SHIPPED | OUTPATIENT
Start: 2023-05-11

## 2023-05-11 SDOH — ECONOMIC STABILITY: FOOD INSECURITY: WITHIN THE PAST 12 MONTHS, YOU WORRIED THAT YOUR FOOD WOULD RUN OUT BEFORE YOU GOT MONEY TO BUY MORE.: NEVER TRUE

## 2023-05-11 SDOH — ECONOMIC STABILITY: HOUSING INSECURITY
IN THE LAST 12 MONTHS, WAS THERE A TIME WHEN YOU DID NOT HAVE A STEADY PLACE TO SLEEP OR SLEPT IN A SHELTER (INCLUDING NOW)?: NO

## 2023-05-11 SDOH — ECONOMIC STABILITY: INCOME INSECURITY: HOW HARD IS IT FOR YOU TO PAY FOR THE VERY BASICS LIKE FOOD, HOUSING, MEDICAL CARE, AND HEATING?: NOT VERY HARD

## 2023-05-11 SDOH — ECONOMIC STABILITY: FOOD INSECURITY: WITHIN THE PAST 12 MONTHS, THE FOOD YOU BOUGHT JUST DIDN'T LAST AND YOU DIDN'T HAVE MONEY TO GET MORE.: NEVER TRUE

## 2023-05-11 ASSESSMENT — ENCOUNTER SYMPTOMS
SINUS PAIN: 0
CHEST TIGHTNESS: 0
WHEEZING: 0
SORE THROAT: 0
GASTROINTESTINAL NEGATIVE: 1
SINUS PRESSURE: 0
RHINORRHEA: 0
COUGH: 0
SHORTNESS OF BREATH: 0

## 2023-05-11 ASSESSMENT — PATIENT HEALTH QUESTIONNAIRE - PHQ9
SUM OF ALL RESPONSES TO PHQ9 QUESTIONS 1 & 2: 2
SUM OF ALL RESPONSES TO PHQ QUESTIONS 1-9: 5
SUM OF ALL RESPONSES TO PHQ QUESTIONS 1-9: 5
2. FEELING DOWN, DEPRESSED OR HOPELESS: 1
9. THOUGHTS THAT YOU WOULD BE BETTER OFF DEAD, OR OF HURTING YOURSELF: 0
10. IF YOU CHECKED OFF ANY PROBLEMS, HOW DIFFICULT HAVE THESE PROBLEMS MADE IT FOR YOU TO DO YOUR WORK, TAKE CARE OF THINGS AT HOME, OR GET ALONG WITH OTHER PEOPLE: 1
SUM OF ALL RESPONSES TO PHQ QUESTIONS 1-9: 5
SUM OF ALL RESPONSES TO PHQ QUESTIONS 1-9: 5
6. FEELING BAD ABOUT YOURSELF - OR THAT YOU ARE A FAILURE OR HAVE LET YOURSELF OR YOUR FAMILY DOWN: 1
1. LITTLE INTEREST OR PLEASURE IN DOING THINGS: 1
8. MOVING OR SPEAKING SO SLOWLY THAT OTHER PEOPLE COULD HAVE NOTICED. OR THE OPPOSITE, BEING SO FIGETY OR RESTLESS THAT YOU HAVE BEEN MOVING AROUND A LOT MORE THAN USUAL: 0
5. POOR APPETITE OR OVEREATING: 0
4. FEELING TIRED OR HAVING LITTLE ENERGY: 1
3. TROUBLE FALLING OR STAYING ASLEEP: 1
7. TROUBLE CONCENTRATING ON THINGS, SUCH AS READING THE NEWSPAPER OR WATCHING TELEVISION: 0

## 2023-05-11 ASSESSMENT — ANXIETY QUESTIONNAIRES
GAD7 TOTAL SCORE: 5
7. FEELING AFRAID AS IF SOMETHING AWFUL MIGHT HAPPEN: 1
IF YOU CHECKED OFF ANY PROBLEMS ON THIS QUESTIONNAIRE, HOW DIFFICULT HAVE THESE PROBLEMS MADE IT FOR YOU TO DO YOUR WORK, TAKE CARE OF THINGS AT HOME, OR GET ALONG WITH OTHER PEOPLE: SOMEWHAT DIFFICULT
1. FEELING NERVOUS, ANXIOUS, OR ON EDGE: 1
6. BECOMING EASILY ANNOYED OR IRRITABLE: 1
2. NOT BEING ABLE TO STOP OR CONTROL WORRYING: 1
3. WORRYING TOO MUCH ABOUT DIFFERENT THINGS: 1
5. BEING SO RESTLESS THAT IT IS HARD TO SIT STILL: 0
4. TROUBLE RELAXING: 0

## 2023-05-11 NOTE — PROGRESS NOTES
Social drinker      Family History   Problem Relation Age of Onset    Arthritis Mother         back    Other Mother         balance    Diabetes type 2  Father 72    Coronary Art Dis Father     Hypertension Father     Arthritis Father     Diabetes Father     Heart Disease Father     High Blood Pressure Father     Cancer Brother         melanoma    Breast Cancer Maternal Aunt     Colon Cancer Maternal Grandmother     Breast Cancer Maternal Cousin         Vitals:    05/11/23 1257   BP: 102/80   Site: Left Upper Arm   Position: Sitting   Cuff Size: Medium Adult   Resp: 14   Temp: 98.6 °F (37 °C)   Weight: 155 lb 9.6 oz (70.6 kg)   Height: 5' 10\" (1.778 m)     Estimated body mass index is 22.33 kg/m² as calculated from the following:    Height as of this encounter: 5' 10\" (1.778 m). Weight as of this encounter: 155 lb 9.6 oz (70.6 kg). Physical Exam  Vitals and nursing note reviewed. Constitutional:       General: She is not in acute distress. Appearance: Normal appearance. She is well-developed and normal weight. Cardiovascular:      Rate and Rhythm: Normal rate and regular rhythm. Pulses: Normal pulses. Heart sounds: Normal heart sounds. Pulmonary:      Effort: Pulmonary effort is normal.      Breath sounds: Normal breath sounds. Skin:     General: Skin is warm. Neurological:      General: No focal deficit present. Mental Status: She is alert and oriented to person, place, and time. Psychiatric:         Attention and Perception: Attention normal.         Mood and Affect: Mood and affect normal.         Speech: Speech normal.         Behavior: Behavior normal.         Thought Content: Thought content normal. Thought content is not paranoid or delusional. Thought content does not include homicidal or suicidal ideation. Thought content does not include homicidal or suicidal plan. Cognition and Memory: Cognition normal.       ASSESSMENT/PLAN:  1.  Primary insomnia  - Stable  -

## 2023-07-11 DIAGNOSIS — N90.89 VULVAR LESION: ICD-10-CM

## 2023-07-11 DIAGNOSIS — Z78.0 MENOPAUSE: ICD-10-CM

## 2023-07-11 DIAGNOSIS — G47.9 SLEEP DIFFICULTIES: ICD-10-CM

## 2023-07-11 DIAGNOSIS — F41.9 ANXIETY: ICD-10-CM

## 2023-07-12 RX ORDER — HYDROXYZINE HYDROCHLORIDE 25 MG/1
25 TABLET, FILM COATED ORAL EVERY 6 HOURS PRN
Qty: 90 TABLET | Refills: 1 | Status: SHIPPED | OUTPATIENT
Start: 2023-07-12

## 2023-07-12 RX ORDER — ESTRADIOL AND NORETHINDRONE ACETATE 1; .5 MG/1; MG/1
TABLET ORAL
Qty: 84 TABLET | Refills: 3 | Status: SHIPPED | OUTPATIENT
Start: 2023-07-12

## 2023-07-12 RX ORDER — VALACYCLOVIR HYDROCHLORIDE 500 MG/1
500 TABLET, FILM COATED ORAL DAILY
Qty: 90 TABLET | Refills: 3 | Status: SHIPPED | OUTPATIENT
Start: 2023-07-12

## 2023-07-17 DIAGNOSIS — Z13.220 LIPID SCREENING: ICD-10-CM

## 2023-07-17 DIAGNOSIS — Z13.1 DIABETES MELLITUS SCREENING: ICD-10-CM

## 2023-07-17 DIAGNOSIS — Z13.21 ENCOUNTER FOR VITAMIN DEFICIENCY SCREENING: ICD-10-CM

## 2023-07-17 DIAGNOSIS — Z00.00 PERIODIC HEALTH ASSESSMENT, GENERAL SCREENING, ADULT: ICD-10-CM

## 2023-07-17 DIAGNOSIS — Z13.29 THYROID DISORDER SCREENING: ICD-10-CM

## 2023-07-17 LAB
25(OH)D3 SERPL-MCNC: 68.3 NG/ML
ALBUMIN SERPL-MCNC: 4.7 G/DL (ref 3.4–5)
ALBUMIN/GLOB SERPL: 2.2 {RATIO} (ref 1.1–2.2)
ALP SERPL-CCNC: 73 U/L (ref 40–129)
ALT SERPL-CCNC: 23 U/L (ref 10–40)
ANION GAP SERPL CALCULATED.3IONS-SCNC: 10 MMOL/L (ref 3–16)
AST SERPL-CCNC: 23 U/L (ref 15–37)
BILIRUB SERPL-MCNC: 0.5 MG/DL (ref 0–1)
BUN SERPL-MCNC: 11 MG/DL (ref 7–20)
CALCIUM SERPL-MCNC: 10.2 MG/DL (ref 8.3–10.6)
CHLORIDE SERPL-SCNC: 98 MMOL/L (ref 99–110)
CHOLEST SERPL-MCNC: 220 MG/DL (ref 0–199)
CO2 SERPL-SCNC: 28 MMOL/L (ref 21–32)
CREAT SERPL-MCNC: 0.8 MG/DL (ref 0.6–1.1)
GFR SERPLBLD CREATININE-BSD FMLA CKD-EPI: >60 ML/MIN/{1.73_M2}
GLUCOSE P FAST SERPL-MCNC: 94 MG/DL (ref 70–99)
HDLC SERPL-MCNC: 49 MG/DL (ref 40–60)
LDL CHOLESTEROL CALCULATED: 144 MG/DL
POTASSIUM SERPL-SCNC: 4.3 MMOL/L (ref 3.5–5.1)
PROT SERPL-MCNC: 6.8 G/DL (ref 6.4–8.2)
SODIUM SERPL-SCNC: 136 MMOL/L (ref 136–145)
TRIGL SERPL-MCNC: 137 MG/DL (ref 0–150)
TSH SERPL DL<=0.005 MIU/L-ACNC: 1.63 UIU/ML (ref 0.27–4.2)
VLDLC SERPL CALC-MCNC: 27 MG/DL

## 2023-07-18 LAB
EST. AVERAGE GLUCOSE BLD GHB EST-MCNC: 88.2 MG/DL
HBA1C MFR BLD: 4.7 %

## 2023-07-25 ENCOUNTER — PATIENT MESSAGE (OUTPATIENT)
Dept: PRIMARY CARE CLINIC | Age: 58
End: 2023-07-25

## 2023-07-25 DIAGNOSIS — F51.01 PRIMARY INSOMNIA: Primary | ICD-10-CM

## 2023-07-26 RX ORDER — ALPRAZOLAM 0.5 MG/1
0.5 TABLET ORAL NIGHTLY PRN
Qty: 30 TABLET | Refills: 0 | Status: SHIPPED | OUTPATIENT
Start: 2023-07-26 | End: 2023-08-25

## 2023-07-27 NOTE — TELEPHONE ENCOUNTER
PDMP Monitoring:  Last PDMP Miguel Ángel Lynne as Reviewed:  Review User Review Instant Review Result   ZABRINA POWELL 7/26/2023 10:22 PM Reviewed PDMP [1]     1. Primary insomnia  - ALPRAZolam (XANAX) 0.5 MG tablet; Take 1 tablet by mouth nightly as needed for Sleep for up to 30 days. Max Daily Amount: 0.5 mg  Dispense: 30 tablet;  Refill: 0

## 2023-08-09 DIAGNOSIS — J30.0 VASOMOTOR RHINITIS: ICD-10-CM

## 2023-08-09 RX ORDER — IPRATROPIUM BROMIDE 42 UG/1
2 SPRAY, METERED NASAL 2 TIMES DAILY
Qty: 3 EACH | Refills: 3 | Status: SHIPPED | OUTPATIENT
Start: 2023-08-09 | End: 2024-08-08

## 2023-08-09 NOTE — TELEPHONE ENCOUNTER
Requested Prescriptions     Pending Prescriptions Disp Refills    ipratropium (ATROVENT) 0.06 % nasal spray 3 each 3     Si sprays by Nasal route 2 times daily

## 2023-08-22 ENCOUNTER — OFFICE VISIT (OUTPATIENT)
Dept: ORTHOPEDIC SURGERY | Age: 58
End: 2023-08-22
Payer: COMMERCIAL

## 2023-08-22 VITALS — BODY MASS INDEX: 22.19 KG/M2 | WEIGHT: 155 LBS | HEIGHT: 70 IN

## 2023-08-22 DIAGNOSIS — M20.21 HALLUX RIGIDUS OF RIGHT FOOT: Primary | ICD-10-CM

## 2023-08-22 PROCEDURE — 99213 OFFICE O/P EST LOW 20 MIN: CPT | Performed by: NURSE PRACTITIONER

## 2023-08-22 PROCEDURE — 20600 DRAIN/INJ JOINT/BURSA W/O US: CPT | Performed by: NURSE PRACTITIONER

## 2023-08-22 RX ORDER — TRIAMCINOLONE ACETONIDE 40 MG/ML
20 INJECTION, SUSPENSION INTRA-ARTICULAR; INTRAMUSCULAR ONCE
Status: COMPLETED | OUTPATIENT
Start: 2023-08-22 | End: 2023-08-22

## 2023-08-22 RX ORDER — LIDOCAINE HYDROCHLORIDE 10 MG/ML
0.5 INJECTION, SOLUTION INFILTRATION; PERINEURAL ONCE
Status: COMPLETED | OUTPATIENT
Start: 2023-08-22 | End: 2023-08-22

## 2023-08-22 RX ADMIN — TRIAMCINOLONE ACETONIDE 20 MG: 40 INJECTION, SUSPENSION INTRA-ARTICULAR; INTRAMUSCULAR at 09:13

## 2023-08-22 RX ADMIN — LIDOCAINE HYDROCHLORIDE 0.5 ML: 10 INJECTION, SOLUTION INFILTRATION; PERINEURAL at 09:12

## 2023-08-22 NOTE — PROGRESS NOTES
CHIEF COMPLAINT: Right great toe pain/Hallux rigidus. HISTORY:  Ms. Cynthia Lantigua 60 y.o.  female presents today for follow up visit for evaluation of persistent right great toe pain which started worsening over time. She is complaining of achy pain. Pain is increase with standing and walking and shoe wear. She rates her pain a 3/10 VAS. Pain is sharp early in the morning with first few steps, dull achy pain by the end of the day. No radiation and no numbness and tingling sensation. No other complaint. No h/o trauma or gout. She had a right great toe hallux rigidus, status post colectomy on 11/4/2021. She had a cortisone injection in the right great toe MTP joint on 2/22/2022 with good improvement but now her pain is returning.     Past Medical History:   Diagnosis Date    Anxiety     Basal cell carcinoma     chest , stomach    DDD (degenerative disc disease), cervical     L4-5; L5-S1    Family history of colon cancer     MGM in mid 62s    History of melanoma 2000    right thigh     Infertility, female     Lumbar foraminal stenosis     Melanoma ([address])     Menopause ovarian failure     Menorrhagia     Migraine     Ovarian cyst      Past Surgical History:   Procedure Laterality Date    BACK SURGERY  2000    X 4    BREAST ENHANCEMENT SURGERY      COLONOSCOPY N/A 8/11/2020    COLONOSCOPY POLYPECTOMY SNARE/COLD BIOPSY performed by Krystal Dumont MD at [facility]  2013    FOOT SURGERY Left     FOOT SURGERY Right 11/4/2021    RIGHT GREAT TOE CHEILECTOMY performed by Jose Obrien MD at [address] [facility]      internal hemorrhoid banding- Dr. Kwabena Chávez in early 26ths     Family History   Problem Relation Age of Onset    Arthritis Mother         back    Other Mother         balance    Diabetes type 2  Father 72    Coronary Art Dis Father     Hypertension Father     Arthritis Father     Diabetes Father     Heart Disease Father

## 2023-09-07 ENCOUNTER — PATIENT MESSAGE (OUTPATIENT)
Dept: PRIMARY CARE CLINIC | Age: 58
End: 2023-09-07

## 2023-09-07 DIAGNOSIS — F51.01 PRIMARY INSOMNIA: Primary | ICD-10-CM

## 2023-09-07 DIAGNOSIS — G43.009 MIGRAINE WITHOUT AURA AND WITHOUT STATUS MIGRAINOSUS, NOT INTRACTABLE: ICD-10-CM

## 2023-09-07 RX ORDER — ALPRAZOLAM 0.5 MG/1
0.5 TABLET ORAL NIGHTLY PRN
COMMUNITY
End: 2023-09-07 | Stop reason: SDUPTHER

## 2023-09-07 RX ORDER — BUTALBITAL, ACETAMINOPHEN AND CAFFEINE 50; 325; 40 MG/1; MG/1; MG/1
TABLET ORAL
Qty: 30 TABLET | Refills: 1 | Status: SHIPPED | OUTPATIENT
Start: 2023-09-07

## 2023-09-07 RX ORDER — ALPRAZOLAM 0.5 MG/1
0.5 TABLET ORAL NIGHTLY PRN
Qty: 30 TABLET | Refills: 0 | Status: SHIPPED | OUTPATIENT
Start: 2023-09-07 | End: 2023-10-07

## 2023-09-07 NOTE — TELEPHONE ENCOUNTER
1. Primary insomnia  - Continue ALPRAZolam (XANAX) 0.5 MG tablet; Take 1 tablet by mouth nightly as needed for Sleep for up to 30 days. Max Daily Amount: 0.5 mg  Dispense: 30 tablet;  Refill: 0    PDMP Monitoring:  Last PDMP Todd as Reviewed:  Review User Review Instant Review Result   ZABRINA POWELL 9/7/2023  7:31 PM Reviewed PDMP [1]

## 2023-09-15 ENCOUNTER — PATIENT MESSAGE (OUTPATIENT)
Dept: PRIMARY CARE CLINIC | Age: 58
End: 2023-09-15

## 2023-09-15 DIAGNOSIS — H00.015 HORDEOLUM EXTERNUM LEFT LOWER EYELID: Primary | ICD-10-CM

## 2023-09-15 RX ORDER — ERYTHROMYCIN 5 MG/G
OINTMENT OPHTHALMIC
Qty: 3.5 G | Refills: 0 | Status: SHIPPED | OUTPATIENT
Start: 2023-09-15 | End: 2023-09-25

## 2023-09-16 NOTE — TELEPHONE ENCOUNTER
1. Hordeolum externum left lower eyelid  - Start erythromycin (ROMYCIN) 5 MG/GM ophthalmic ointment; Apply 1 ribbon of ointment to left lower eyelash line twice a day until infection is gone.   Dispense: 3.5 g; Refill: 0

## 2023-09-25 DIAGNOSIS — J30.0 VASOMOTOR RHINITIS: ICD-10-CM

## 2023-09-25 RX ORDER — IPRATROPIUM BROMIDE 42 UG/1
2 SPRAY, METERED NASAL 2 TIMES DAILY
Qty: 3 EACH | Refills: 3 | OUTPATIENT
Start: 2023-09-25 | End: 2024-09-24

## 2023-09-25 NOTE — TELEPHONE ENCOUNTER
ipratropium (ATROVENT) 0.06 % nasal spray 3 each 3 8/9/2023 8/8/2024    Sig - Route: 2 sprays by Nasal route 2 times daily - Nasal    Sent to pharmacy as: Ipratropium Bromide 0.06 % Nasal Solution    E-Prescribing Status: Receipt confirmed by pharmacy (8/9/2023  4:03 PM EDT)        Refill to soon

## 2023-10-08 DIAGNOSIS — M54.50 RIGHT-SIDED LOW BACK PAIN WITHOUT SCIATICA, UNSPECIFIED CHRONICITY: ICD-10-CM

## 2023-10-08 DIAGNOSIS — N90.89 VULVAR LESION: ICD-10-CM

## 2023-10-09 RX ORDER — VALACYCLOVIR HYDROCHLORIDE 500 MG/1
500 TABLET, FILM COATED ORAL DAILY
Qty: 90 TABLET | Refills: 3 | Status: SHIPPED | OUTPATIENT
Start: 2023-10-09

## 2023-10-09 RX ORDER — DICLOFENAC SODIUM 75 MG/1
75 TABLET, DELAYED RELEASE ORAL 2 TIMES DAILY
Qty: 60 TABLET | Refills: 1 | OUTPATIENT
Start: 2023-10-09

## 2023-10-24 ENCOUNTER — PATIENT MESSAGE (OUTPATIENT)
Dept: PRIMARY CARE CLINIC | Age: 58
End: 2023-10-24

## 2023-10-24 DIAGNOSIS — F51.01 PRIMARY INSOMNIA: Primary | ICD-10-CM

## 2023-10-25 RX ORDER — ALPRAZOLAM 0.5 MG/1
0.5 TABLET ORAL NIGHTLY PRN
Qty: 30 TABLET | Refills: 0 | Status: SHIPPED | OUTPATIENT
Start: 2023-10-25 | End: 2023-11-24

## 2023-10-26 NOTE — TELEPHONE ENCOUNTER
1. Primary insomnia  - Continue ALPRAZolam (XANAX) 0.5 MG tablet; Take 1 tablet by mouth nightly as needed for Sleep for up to 30 days. Max Daily Amount: 0.5 mg  Dispense: 30 tablet;  Refill: 0    PDMP Monitoring:  Last PDMP Todd as Reviewed:  Review User Review Instant Review Result   ZABRINA POWELL 10/25/2023  8:08 PM Reviewed PDMP [1]

## 2023-11-13 ENCOUNTER — OFFICE VISIT (OUTPATIENT)
Dept: PRIMARY CARE CLINIC | Age: 58
End: 2023-11-13
Payer: COMMERCIAL

## 2023-11-13 VITALS
WEIGHT: 155 LBS | BODY MASS INDEX: 22.96 KG/M2 | SYSTOLIC BLOOD PRESSURE: 116 MMHG | OXYGEN SATURATION: 99 % | DIASTOLIC BLOOD PRESSURE: 80 MMHG | HEIGHT: 69 IN | HEART RATE: 66 BPM

## 2023-11-13 DIAGNOSIS — F41.9 ANXIETY: Primary | ICD-10-CM

## 2023-11-13 DIAGNOSIS — G43.009 MIGRAINE WITHOUT AURA AND WITHOUT STATUS MIGRAINOSUS, NOT INTRACTABLE: ICD-10-CM

## 2023-11-13 DIAGNOSIS — Z12.83 SKIN CANCER SCREENING: ICD-10-CM

## 2023-11-13 DIAGNOSIS — Z85.820 HISTORY OF MELANOMA: ICD-10-CM

## 2023-11-13 DIAGNOSIS — G47.9 SLEEP DIFFICULTIES: ICD-10-CM

## 2023-11-13 DIAGNOSIS — M20.21 HALLUX RIGIDUS OF RIGHT FOOT: ICD-10-CM

## 2023-11-13 PROCEDURE — 99214 OFFICE O/P EST MOD 30 MIN: CPT | Performed by: NURSE PRACTITIONER

## 2023-11-13 RX ORDER — NAPROXEN 500 MG/1
500 TABLET ORAL 2 TIMES DAILY WITH MEALS
Qty: 60 TABLET | Refills: 0 | Status: SHIPPED | OUTPATIENT
Start: 2023-11-13 | End: 2023-12-13

## 2023-11-13 RX ORDER — HYDROXYZINE HYDROCHLORIDE 25 MG/1
25 TABLET, FILM COATED ORAL EVERY 6 HOURS PRN
Qty: 90 TABLET | Refills: 1 | Status: SHIPPED | OUTPATIENT
Start: 2023-11-13

## 2023-11-13 RX ORDER — ESCITALOPRAM OXALATE 20 MG/1
20 TABLET ORAL DAILY
Qty: 90 TABLET | Refills: 1 | Status: SHIPPED | OUTPATIENT
Start: 2023-11-13

## 2023-11-13 ASSESSMENT — ENCOUNTER SYMPTOMS
SORE THROAT: 0
CHEST TIGHTNESS: 0
SINUS PRESSURE: 0
SHORTNESS OF BREATH: 0
WHEEZING: 0
COUGH: 0
GASTROINTESTINAL NEGATIVE: 1
RHINORRHEA: 0
SINUS PAIN: 0

## 2023-11-13 ASSESSMENT — PATIENT HEALTH QUESTIONNAIRE - PHQ9
1. LITTLE INTEREST OR PLEASURE IN DOING THINGS: 0
SUM OF ALL RESPONSES TO PHQ QUESTIONS 1-9: 3
9. THOUGHTS THAT YOU WOULD BE BETTER OFF DEAD, OR OF HURTING YOURSELF: 0
SUM OF ALL RESPONSES TO PHQ QUESTIONS 1-9: 3
6. FEELING BAD ABOUT YOURSELF - OR THAT YOU ARE A FAILURE OR HAVE LET YOURSELF OR YOUR FAMILY DOWN: 0
SUM OF ALL RESPONSES TO PHQ9 QUESTIONS 1 & 2: 0
7. TROUBLE CONCENTRATING ON THINGS, SUCH AS READING THE NEWSPAPER OR WATCHING TELEVISION: 0
4. FEELING TIRED OR HAVING LITTLE ENERGY: 1
5. POOR APPETITE OR OVEREATING: 0
2. FEELING DOWN, DEPRESSED OR HOPELESS: 0
SUM OF ALL RESPONSES TO PHQ QUESTIONS 1-9: 3
3. TROUBLE FALLING OR STAYING ASLEEP: 2
8. MOVING OR SPEAKING SO SLOWLY THAT OTHER PEOPLE COULD HAVE NOTICED. OR THE OPPOSITE, BEING SO FIGETY OR RESTLESS THAT YOU HAVE BEEN MOVING AROUND A LOT MORE THAN USUAL: 0
10. IF YOU CHECKED OFF ANY PROBLEMS, HOW DIFFICULT HAVE THESE PROBLEMS MADE IT FOR YOU TO DO YOUR WORK, TAKE CARE OF THINGS AT HOME, OR GET ALONG WITH OTHER PEOPLE: 0
SUM OF ALL RESPONSES TO PHQ QUESTIONS 1-9: 3

## 2023-11-13 ASSESSMENT — ANXIETY QUESTIONNAIRES
7. FEELING AFRAID AS IF SOMETHING AWFUL MIGHT HAPPEN: 0
4. TROUBLE RELAXING: 0
5. BEING SO RESTLESS THAT IT IS HARD TO SIT STILL: 0
GAD7 TOTAL SCORE: 2
6. BECOMING EASILY ANNOYED OR IRRITABLE: 0
3. WORRYING TOO MUCH ABOUT DIFFERENT THINGS: 1
2. NOT BEING ABLE TO STOP OR CONTROL WORRYING: 0
1. FEELING NERVOUS, ANXIOUS, OR ON EDGE: 1
IF YOU CHECKED OFF ANY PROBLEMS ON THIS QUESTIONNAIRE, HOW DIFFICULT HAVE THESE PROBLEMS MADE IT FOR YOU TO DO YOUR WORK, TAKE CARE OF THINGS AT HOME, OR GET ALONG WITH OTHER PEOPLE: NOT DIFFICULT AT ALL

## 2023-11-13 NOTE — PROGRESS NOTES
11/13/2023    Chief Complaint   Patient presents with    6 Month Follow-Up     Anxiety, depression, migraine heaches & insomnia        Ollie Herrera is a 60 y.o. female, presents today for 6 MON F\U anxiety, depression, migraine heaches & insomnia       HPI   Anxiety and Depression  Current medication: Lexapro 20 mg daily and hydroxyzine 25 mg as needed for anxiety. Patient reports anxiety and depression is well controlled with current medications dosages. She rarely takes hydroxyzine. Denies side effects medications. Denies homicidal and suicidal ideation. Migraine Headaches  Patient reports history of migraine headaches. Typically she has ~1 bad headache a month. Frequency and intensity has decreased since she went through menopause. She has no aura at onset of headache. Reports her mom and sister have a history of migraine headaches with aura. She reports nausea (without vomiting) during headaches. She takes Fioricet at onset of headache that is effective in aborting headaches. She requests a refill today. Insomnia  Patient reports history of insomnia with difficulty falling asleep. In the past she has taken Alprazolam and had 1 prescription for Valium. Alprazolam has been most effective, which she takes infrequently. Alprazolam last filled 10/26/23. PDMP Monitoring:  Last PDMP Ace Sit as Reviewed:  Review User Review Instant Review Result   PHILIPPEZABRINA NOBLE 11/13/2023 10:02 AM Reviewed PDMP [1]   - Alprazolam 0.5 mg, #30 tablets last filled 10/26/23        History of skin cancer   She reports history of melanoma and basal cell carcinoma  Patient request dermatology referral for skin cancer screening. Review of Systems   Constitutional:  Negative for activity change, appetite change, fatigue and unexpected weight change. HENT:  Positive for postnasal drip. Negative for congestion, rhinorrhea, sinus pressure, sinus pain, sneezing and sore throat.     Eyes:  Negative for visual

## 2023-11-27 ENCOUNTER — PATIENT MESSAGE (OUTPATIENT)
Dept: PRIMARY CARE CLINIC | Age: 58
End: 2023-11-27

## 2023-11-27 DIAGNOSIS — T48.5X5A RHINITIS MEDICAMENTOSA: ICD-10-CM

## 2023-11-27 DIAGNOSIS — F45.8 NEUROPATHIC PRURITUS: ICD-10-CM

## 2023-11-27 DIAGNOSIS — J31.0 RHINITIS MEDICAMENTOSA: ICD-10-CM

## 2023-11-27 DIAGNOSIS — G47.9 SLEEP DIFFICULTIES: Primary | ICD-10-CM

## 2023-11-28 RX ORDER — PREDNISONE 10 MG/1
TABLET ORAL
Qty: 30 TABLET | Refills: 0 | Status: SHIPPED | OUTPATIENT
Start: 2023-11-28

## 2023-11-28 RX ORDER — ALPRAZOLAM 0.5 MG/1
0.5 TABLET ORAL NIGHTLY PRN
Qty: 30 TABLET | Refills: 0 | Status: SHIPPED | OUTPATIENT
Start: 2023-11-28 | End: 2023-12-28

## 2023-11-28 RX ORDER — ALPRAZOLAM 0.5 MG/1
0.5 TABLET ORAL NIGHTLY PRN
COMMUNITY
End: 2023-11-28 | Stop reason: SDUPTHER

## 2023-11-28 RX ORDER — TRIAMCINOLONE ACETONIDE 1 MG/G
CREAM TOPICAL
Qty: 80 G | Refills: 2 | OUTPATIENT
Start: 2023-11-28

## 2023-12-06 ENCOUNTER — E-VISIT (OUTPATIENT)
Dept: PRIMARY CARE CLINIC | Age: 58
End: 2023-12-06
Payer: COMMERCIAL

## 2023-12-06 DIAGNOSIS — R09.81 NASAL CONGESTION: Primary | ICD-10-CM

## 2023-12-06 DIAGNOSIS — B34.9 VIRAL ILLNESS: ICD-10-CM

## 2023-12-06 PROCEDURE — 99422 OL DIG E/M SVC 11-20 MIN: CPT | Performed by: NURSE PRACTITIONER

## 2023-12-06 RX ORDER — ACETAMINOPHEN 500 MG
TABLET ORAL
COMMUNITY
Start: 2023-12-06

## 2023-12-06 RX ORDER — BROMPHENIRAMINE MALEATE, PSEUDOEPHEDRINE HYDROCHLORIDE, AND DEXTROMETHORPHAN HYDROBROMIDE 2; 30; 10 MG/5ML; MG/5ML; MG/5ML
5 SYRUP ORAL 4 TIMES DAILY PRN
Qty: 200 ML | Refills: 0 | Status: SHIPPED | OUTPATIENT
Start: 2023-12-06 | End: 2023-12-16

## 2023-12-06 ASSESSMENT — LIFESTYLE VARIABLES: SMOKING_STATUS: NO, I'VE NEVER SMOKED

## 2023-12-06 NOTE — PROGRESS NOTES
1. Nasal congestion  - POCT Influenza A/B; Future  - COVID-19; Future  - Start brompheniramine-pseudoephedrine-DM 2-30-10 MG/5ML syrup; Take 5 mLs by mouth 4 times daily as needed for Congestion or Cough  Dispense: 200 mL; Refill: 0    2. Viral illness  - POCT Influenza A/B; Future  - COVID-19; Future  - acetaminophen (TYLENOL) 500 MG tablet;  Take 1 or 2 tablets by mouth every 8 hours as needed for headache, fever- OTC

## 2024-01-01 ENCOUNTER — PATIENT MESSAGE (OUTPATIENT)
Dept: PRIMARY CARE CLINIC | Age: 59
End: 2024-01-01

## 2024-01-01 DIAGNOSIS — F51.01 PRIMARY INSOMNIA: Primary | ICD-10-CM

## 2024-01-01 DIAGNOSIS — F41.9 ANXIETY: ICD-10-CM

## 2024-01-04 RX ORDER — ALPRAZOLAM 0.5 MG/1
0.5 TABLET ORAL NIGHTLY PRN
Qty: 30 TABLET | Refills: 0 | Status: SHIPPED | OUTPATIENT
Start: 2024-01-04 | End: 2024-02-03

## 2024-01-04 NOTE — TELEPHONE ENCOUNTER
1. Anxiety  -Continue ALPRAZolam (XANAX) 0.5 MG tablet; Take 1 tablet by mouth nightly as needed for Sleep or Anxiety for up to 30 days. Max Daily Amount: 0.5 mg  Dispense: 30 tablet; Refill: 0    2. Primary insomnia  -Continue ALPRAZolam (XANAX) 0.5 MG tablet; Take 1 tablet by mouth nightly as needed for Sleep or Anxiety for up to 30 days. Max Daily Amount: 0.5 mg  Dispense: 30 tablet; Refill: 0       PDMP Monitoring:  Last PDMP Todd as Reviewed:  Review User Review Instant Review Result   ZABRINA POWELL 1/4/2024  6:15 PM Reviewed PDMP [1]

## 2024-01-13 DIAGNOSIS — N90.89 VULVAR LESION: ICD-10-CM

## 2024-01-15 RX ORDER — VALACYCLOVIR HYDROCHLORIDE 500 MG/1
500 TABLET, FILM COATED ORAL DAILY
Qty: 90 TABLET | Refills: 3 | Status: SHIPPED | OUTPATIENT
Start: 2024-01-15

## 2024-01-22 ENCOUNTER — TELEPHONE (OUTPATIENT)
Dept: PRIMARY CARE CLINIC | Age: 59
End: 2024-01-22

## 2024-01-22 DIAGNOSIS — Z12.31 BREAST CANCER SCREENING BY MAMMOGRAM: ICD-10-CM

## 2024-01-22 DIAGNOSIS — N63.20 MASS OF LEFT BREAST, UNSPECIFIED QUADRANT: Primary | ICD-10-CM

## 2024-01-22 NOTE — TELEPHONE ENCOUNTER
Central Scheduling called because the patient was trying to schedule her annual mammogram. When the patient was answering the questions that central scheduling needs to ask, the patient said she felt a lump on her left side of breast. Scheduling asked if Sotelo could put in orders for a diagnostic mammogram bilateral and an ultrasound.

## 2024-01-25 NOTE — TELEPHONE ENCOUNTER
1. Mass of left breast, unspecified quadrant  - [facility] MELINDA DIGITAL DIAGNOSTIC BILATERAL; Future  - US BREAST LIMITED LEFT; Future    2. Breast cancer screening by mammogram  - [facility] MELINDA DIGITAL DIAGNOSTIC BILATERAL; Future

## 2024-01-25 NOTE — TELEPHONE ENCOUNTER
LM with Idania Sotelo put in the new orders for her mammogram and to go head and schedule her appointment.

## 2024-02-05 DIAGNOSIS — M54.50 RIGHT-SIDED LOW BACK PAIN WITHOUT SCIATICA, UNSPECIFIED CHRONICITY: ICD-10-CM

## 2024-02-05 DIAGNOSIS — G47.9 SLEEP DIFFICULTIES: ICD-10-CM

## 2024-02-05 DIAGNOSIS — F51.01 PRIMARY INSOMNIA: ICD-10-CM

## 2024-02-05 DIAGNOSIS — F41.9 ANXIETY: ICD-10-CM

## 2024-02-05 RX ORDER — ALPRAZOLAM 0.5 MG/1
0.5 TABLET ORAL NIGHTLY PRN
Qty: 30 TABLET | Refills: 0 | Status: SHIPPED | OUTPATIENT
Start: 2024-02-05 | End: 2024-03-06

## 2024-02-05 RX ORDER — HYDROXYZINE HYDROCHLORIDE 25 MG/1
25 TABLET, FILM COATED ORAL EVERY 6 HOURS PRN
Qty: 90 TABLET | Refills: 1 | Status: SHIPPED | OUTPATIENT
Start: 2024-02-05

## 2024-02-05 NOTE — TELEPHONE ENCOUNTER
Recent Visits  Date Type Provider Dept   11/13/23 Office Visit Ashleigh Wheeler APRN - CNP [facility]   05/11/23 Office Visit Ashleigh Wheeler APRN - [facility]NP [facility]   11/14/22 Office Visit Idania Avalos MD [facility]   09/21/22 Office Visit Idania Avalos MD [facility]   09/07/22 Office Visit Idnaia Avalos MD [facility]   Showing recent visits within past 540 days with a meds authorizing provider and meeting all other requirements  Future Appointments  Date Type Provider Dept   05/13/24 Appointment Ashleigh Wheeler APRN - CNP [facility]   Showing future appointments within next 150 days with a meds authorizing provider and meeting all other requirements

## 2024-02-05 NOTE — TELEPHONE ENCOUNTER
Recent Visits  Date Type Provider Dept   11/13/23 Office Visit Ashleigh Wheeler APRN - CNP [facility]   05/11/23 Office Visit Ashleigh Wheeler APRN - [facility]   11/14/22 Office Visit Idania Avalos MD [facility]   09/21/22 Office Visit Idania Avalos MD [facility]   09/07/22 Office Visit [facility]   Showing recent visits within past 540 days with a meds authorizing provider and meeting all other requirements  Future Appointments  Date Type Provider Dept   05/13/24 Appointment Ashleigh Wheeler APRN - [facility]   Showing future appointments within next 150 days with a meds authorizing provider and meeting all other requirements

## 2024-02-05 NOTE — TELEPHONE ENCOUNTER
1. Anxiety  -Continue hydrOXYzine HCl (ATARAX) 25 MG tablet; Take 1 tablet by mouth every 6 hours as needed for Anxiety TAKE 1 TABLET EVERY 6 HOURS AS NEEDED FOR ANXIETY (SLEEPING DIFFICULTIES)  Dispense: 90 tablet; Refill: 1  - ALPRAZolam (XANAX) 0.5 MG tablet; Take 1 tablet by mouth nightly as needed for Sleep or Anxiety for up to 30 days. Max Daily Amount: 0.5 mg  Dispense: 30 tablet; Refill: 0    2. Sleep difficulties  -Continue hydrOXYzine HCl (ATARAX) 25 MG tablet; Take 1 tablet by mouth every 6 hours as needed for Anxiety TAKE 1 TABLET EVERY 6 HOURS AS NEEDED FOR ANXIETY (SLEEPING DIFFICULTIES)  Dispense: 90 tablet; Refill: 1    3. Primary insomnia  -Continue ALPRAZolam (XANAX) 0.5 MG tablet; Take 1 tablet by mouth nightly as needed for Sleep or Anxiety for up to 30 days. Max Daily Amount: 0.5 mg  Dispense: 30 tablet; Refill: 0    PDMP Monitoring:    Last PDMP Todd as Reviewed:  Review User Review Instant Review Result   ZABRINA POWELL 2/5/2024 12:19 PM Reviewed PDMP [1]

## 2024-02-08 RX ORDER — DICLOFENAC SODIUM 75 MG/1
75 TABLET, DELAYED RELEASE ORAL 2 TIMES DAILY
Qty: 60 TABLET | Refills: 1 | Status: SHIPPED | OUTPATIENT
Start: 2024-02-08

## 2024-02-08 NOTE — TELEPHONE ENCOUNTER
Medication:   Requested Prescriptions     Pending Prescriptions Disp Refills    diclofenac (VOLTAREN) 75 MG EC tablet 60 tablet 1     Sig: Take 1 tablet by mouth 2 times daily      Last Filled:  11/14/2022 per previous pcp Dr.Mary Avalos    Patient Phone Number: [phone] (home)     Last appt: 11/14/2022   Next appt: Visit date not found    Last OARRS:       9/7/2022     1:23 PM   RX Monitoring   Periodic Controlled Substance Monitoring Possible medication side effects, risk of tolerance/dependence & alternative treatments discussed.;No signs of potential drug abuse or diversion identified.     PDMP Monitoring:    Last PDMP Todd as Reviewed ([place]):  Review User Review Instant Review Result   ZABRINA WHEELER 2/5/2024 12:19 PM Reviewed PDMP [1]     Preferred Pharmacy:   [facility] [id number] [facility], [place] - [address] [facility] [phone] - F [phone]  [address] [facility] [place] [address]  Phone: [phone] Fax: [phone]    Recent Visits  Date Type Provider Dept   11/13/23 Office Visit Zabrina Wheeler APRN - [facility]   05/11/23 Office Visit Zabrina Wheeler APRN - [facility]   11/14/22 Office Visit Idania Avalos MD [facility]   09/21/22 Office Visit Idania Avalos MD [facility]    09/07/22 Office Visit [facility]    Showing recent visits within past 540 days with a meds authorizing provider and meeting all other requirements  Future Appointments  Date Type Provider Dept   05/13/24 Appointment Zabrina Wheeler APRN - [facility]   Showing future appointments within next 150 days with a meds authorizing provider and meeting all other requirements     11/14/2022

## 2024-03-05 ENCOUNTER — HOSPITAL ENCOUNTER (OUTPATIENT)
Dept: ULTRASOUND IMAGING | Age: 59
Discharge: HOME | End: 2024-03-05
Payer: COMMERCIAL

## 2024-03-05 ENCOUNTER — HOSPITAL ENCOUNTER (OUTPATIENT)
Dept: WOMENS IMAGING | Age: 59
Discharge: HOME | End: 2024-03-05
Payer: COMMERCIAL

## 2024-03-05 VITALS — HEIGHT: 69 IN | BODY MASS INDEX: 22.96 KG/M2 | WEIGHT: 155 LBS

## 2024-03-05 DIAGNOSIS — Z12.31 BREAST CANCER SCREENING BY MAMMOGRAM: ICD-10-CM

## 2024-03-05 DIAGNOSIS — N63.20 MASS OF LEFT BREAST, UNSPECIFIED QUADRANT: ICD-10-CM

## 2024-03-05 PROCEDURE — G0279 TOMOSYNTHESIS, MAMMO: HCPCS

## 2024-03-05 PROCEDURE — 76642 ULTRASOUND BREAST LIMITED: CPT

## 2024-03-06 DIAGNOSIS — F41.9 ANXIETY: ICD-10-CM

## 2024-03-06 DIAGNOSIS — F51.01 PRIMARY INSOMNIA: ICD-10-CM

## 2024-03-06 RX ORDER — ALPRAZOLAM 0.5 MG/1
0.5 TABLET ORAL NIGHTLY PRN
Qty: 30 TABLET | Refills: 0 | Status: CANCELLED | OUTPATIENT
Start: 2024-03-06 | End: 2024-04-05

## 2024-03-06 NOTE — TELEPHONE ENCOUNTER
Recent Visits  Date Type Provider Dept   11/13/23 Office Visit Ashleigh Wheeler APRN - [facility]   05/11/23 Office Visit Ashleigh Wheeler APRN - [facility]   11/14/22 Office Visit Idania Avalos MD [facility]   09/21/22 Office Visit [facility]   Showing recent visits within past 540 days with a meds authorizing provider and meeting all other requirements  Future Appointments  Date Type Provider Dept   05/13/24 Appointment Ashleigh Wheeler APRN - [facility]   Showing future appointments within next 150 days with a meds authorizing provider and meeting all other requirements

## 2024-03-07 ENCOUNTER — TELEPHONE (OUTPATIENT)
Dept: PRIMARY CARE CLINIC | Age: 59
End: 2024-03-07

## 2024-03-07 RX ORDER — ALPRAZOLAM 0.5 MG/1
0.5 TABLET ORAL NIGHTLY PRN
Qty: 30 TABLET | Refills: 0 | Status: SHIPPED | OUTPATIENT
Start: 2024-03-07 | End: 2024-04-06

## 2024-03-07 RX ORDER — ESCITALOPRAM OXALATE 20 MG/1
20 TABLET ORAL DAILY
Qty: 90 TABLET | Refills: 1 | Status: SHIPPED | OUTPATIENT
Start: 2024-03-07

## 2024-03-07 NOTE — TELEPHONE ENCOUNTER
----- Message from DEXTER Gordillo - CNP sent at 3/7/2024  7:41 AM EST -----  Please notify patient left breast ultrasound was negative for malignancy.  Benign epidermal inclusion cyst in right axilla-if bothersome to patient will refer to general surgery for excision    Thank you.

## 2024-03-07 NOTE — TELEPHONE ENCOUNTER
Recent Visits  Date Type Provider Dept   11/13/23 Office Visit Ashleigh Wheeler APRN - [facility]   05/11/23 Office Visit Ashleigh Wheeler APRN - [facility]   11/14/22 Office Visit Idania Avalos MD [facility]   09/21/22 Office Visit [facility]   Showing recent visits within past 540 days with a meds authorizing provider and meeting all other requirements  Future Appointments  Date Type Provider Dept   05/13/24 Appointment Ashleigh Wheeler APRN - [facility]   Showing future appointments within next 150 days with a meds authorizing provider and meeting all other requirements

## 2024-03-12 ENCOUNTER — PATIENT MESSAGE (OUTPATIENT)
Dept: PRIMARY CARE CLINIC | Age: 59
End: 2024-03-12

## 2024-03-12 NOTE — TELEPHONE ENCOUNTER
From: NOAH MCKINLEY  To: Idania Batres  Sent: 3/12/2024 7:45 AM EDT  Subject: AFIA MELINDA DIGITAL DIAGNOSTIC AUGMENTED BILATERAL RESULTS    Hello Idania Ramos,    Your mammogram was NEGATIVE for malignancy.  -Benign epidermal inclusion cyst noted in right axilla-patient to follow-up if cyst becomes bothersome.  - Continue monthly self breast exams.  - Schedule appointment if lump(s)/mass is found.  - Annual screening mammogram is recommended in 12 months.      Thank you.

## 2024-03-29 ENCOUNTER — TELEPHONE (OUTPATIENT)
Dept: PRIMARY CARE CLINIC | Age: 59
End: 2024-03-29

## 2024-03-29 NOTE — TELEPHONE ENCOUNTER
Called patient to review mammogram results, left vm to call office and to check [employer] message dated 3/14/24 from Ashleigh Wheeler.

## 2024-04-04 DIAGNOSIS — F41.9 ANXIETY: ICD-10-CM

## 2024-04-04 DIAGNOSIS — N90.89 VULVAR LESION: ICD-10-CM

## 2024-04-04 DIAGNOSIS — G43.009 MIGRAINE WITHOUT AURA AND WITHOUT STATUS MIGRAINOSUS, NOT INTRACTABLE: ICD-10-CM

## 2024-04-04 DIAGNOSIS — F51.01 PRIMARY INSOMNIA: ICD-10-CM

## 2024-04-05 RX ORDER — VALACYCLOVIR HYDROCHLORIDE 500 MG/1
500 TABLET, FILM COATED ORAL DAILY
Qty: 90 TABLET | Refills: 3 | Status: SHIPPED | OUTPATIENT
Start: 2024-04-05

## 2024-04-05 NOTE — TELEPHONE ENCOUNTER
Recent Visits  Date Type Provider Dept   11/13/23 Office Visit Ashleigh Wheeler APRN - [facility]   05/11/23 Office Visit Ashleigh Wheeler APRN - [facility]   11/14/22 Office Visit Idania Avalos MD [facility]   Showing recent visits within past 540 days with a meds authorizing provider and meeting all other requirements  Future Appointments  Date Type Provider Dept   05/13/24 Appointment [facility]   Showing future appointments within next 150 days with a meds authorizing provider and meeting all other requirements

## 2024-04-07 DIAGNOSIS — J30.0 VASOMOTOR RHINITIS: ICD-10-CM

## 2024-04-09 RX ORDER — IPRATROPIUM BROMIDE 42 UG/1
2 SPRAY, METERED NASAL 2 TIMES DAILY
Qty: 12 EACH | Refills: 5 | Status: SHIPPED | OUTPATIENT
Start: 2024-04-09 | End: 2024-10-06

## 2024-04-09 RX ORDER — BUTALBITAL, ACETAMINOPHEN AND CAFFEINE 50; 325; 40 MG/1; MG/1; MG/1
TABLET ORAL
Qty: 30 TABLET | Refills: 1 | Status: SHIPPED | OUTPATIENT
Start: 2024-04-09

## 2024-04-09 RX ORDER — ALPRAZOLAM 0.5 MG/1
0.5 TABLET ORAL NIGHTLY PRN
Qty: 30 TABLET | Refills: 0 | Status: SHIPPED | OUTPATIENT
Start: 2024-04-09 | End: 2024-05-09

## 2024-04-09 NOTE — TELEPHONE ENCOUNTER
Recent Visits  Date Type Provider Dept   11/13/23 Office Visit Ashleigh Wheeler APRN - [facility]   05/11/23 Office Visit Ashleigh Wheeler APRN - [facility]   11/14/22 Office Visit Idania Avalos MD [facility]   Showing recent visits within past 540 days with a meds authorizing provider and meeting all other requirements  Future Appointments  Date Type Provider Dept   05/13/24 Appointment [facility]   Showing future appointments within next 150 days with a meds authorizing provider and meeting all other requirements

## 2024-05-06 DIAGNOSIS — F41.9 ANXIETY: ICD-10-CM

## 2024-05-06 DIAGNOSIS — F51.01 PRIMARY INSOMNIA: ICD-10-CM

## 2024-05-07 NOTE — TELEPHONE ENCOUNTER
Medication:   Requested Prescriptions     Pending Prescriptions Disp Refills    ALPRAZolam (XANAX) 0.5 MG tablet 30 tablet 0     Sig: Take 1 tablet by mouth nightly as needed for Sleep or Anxiety for up to 30 days. Max Daily Amount: 0.5 mg      Last Filled:  4/9/24    Patient Phone Number: [phone] (home)     Last appt: 12/6/2023   Next appt: 5/13/2024    Last OARRS:       9/7/2022     1:23 PM   RX Monitoring   Periodic Controlled Substance Monitoring Possible medication side effects, risk of tolerance/dependence & alternative treatments discussed.;No signs of potential drug abuse or diversion identified.     PDMP Monitoring:    Last PDMP Todd as Reviewed ([place]):  Review User Review Instant Review Result   ZABRINA WHEELER 2/5/2024 12:19 PM Reviewed PDMP [1]     Preferred Pharmacy:   [facility] [id number] [facility] [address] [facility] [phone] -  [phone]  [address] [place] [address] [place] [address]  Phone: [phone] Fax: [phone]    Recent Visits  Date Type Provider Dept   11/13/23 Office Visit Zabrina Wheeler APRN - [facility]   05/11/23 Office Visit Zabrina Wheeler APRN - [facility]   11/14/22 Office Visit [facility]   Showing recent visits within past 540 days with a meds authorizing provider and meeting all other requirements  Future Appointments  Date Type Provider Dept   05/13/24 Appointment Zabrina Wheeler APRN - [facility]   Showing future appointments within next 150 days with a meds authorizing provider and meeting all other requirements     12/6/2023

## 2024-05-08 RX ORDER — ALPRAZOLAM 0.5 MG/1
0.5 TABLET ORAL NIGHTLY PRN
Qty: 30 TABLET | Refills: 0 | Status: SHIPPED | OUTPATIENT
Start: 2024-05-08 | End: 2024-06-07

## 2024-05-08 NOTE — TELEPHONE ENCOUNTER
1. Anxiety  - ALPRAZolam (XANAX) 0.5 MG tablet; Take 1 tablet by mouth nightly as needed for Sleep or Anxiety for up to 30 days. Max Daily Amount: 0.5 mg  Dispense: 30 tablet; Refill: 0    2. Primary insomnia  - ALPRAZolam (XANAX) 0.5 MG tablet; Take 1 tablet by mouth nightly as needed for Sleep or Anxiety for up to 30 days. Max Daily Amount: 0.5 mg  Dispense: 30 tablet; Refill: 0     PDMP Monitoring:  Last PDMP Todd as Reviewed:  Review User Review Instant Review Result   ZABRINA POWELL 5/8/2024 11:21 AM Reviewed PDMP [1]

## 2024-05-22 SDOH — ECONOMIC STABILITY: FOOD INSECURITY: WITHIN THE PAST 12 MONTHS, YOU WORRIED THAT YOUR FOOD WOULD RUN OUT BEFORE YOU GOT MONEY TO BUY MORE.: NEVER TRUE

## 2024-05-22 SDOH — ECONOMIC STABILITY: TRANSPORTATION INSECURITY
IN THE PAST 12 MONTHS, HAS LACK OF TRANSPORTATION KEPT YOU FROM MEETINGS, WORK, OR FROM GETTING THINGS NEEDED FOR DAILY LIVING?: NO

## 2024-05-22 SDOH — ECONOMIC STABILITY: FOOD INSECURITY: WITHIN THE PAST 12 MONTHS, THE FOOD YOU BOUGHT JUST DIDN'T LAST AND YOU DIDN'T HAVE MONEY TO GET MORE.: NEVER TRUE

## 2024-05-22 SDOH — ECONOMIC STABILITY: INCOME INSECURITY: HOW HARD IS IT FOR YOU TO PAY FOR THE VERY BASICS LIKE FOOD, HOUSING, MEDICAL CARE, AND HEATING?: NOT HARD AT ALL

## 2024-05-22 ASSESSMENT — PATIENT HEALTH QUESTIONNAIRE - PHQ9
2. FEELING DOWN, DEPRESSED OR HOPELESS: NOT AT ALL
SUM OF ALL RESPONSES TO PHQ9 QUESTIONS 1 & 2: 0
SUM OF ALL RESPONSES TO PHQ9 QUESTIONS 1 & 2: 0
SUM OF ALL RESPONSES TO PHQ QUESTIONS 1-9: 0
2. FEELING DOWN, DEPRESSED OR HOPELESS: NOT AT ALL
1. LITTLE INTEREST OR PLEASURE IN DOING THINGS: NOT AT ALL
1. LITTLE INTEREST OR PLEASURE IN DOING THINGS: NOT AT ALL
SUM OF ALL RESPONSES TO PHQ QUESTIONS 1-9: 0

## 2024-05-23 ENCOUNTER — OFFICE VISIT (OUTPATIENT)
Dept: PRIMARY CARE CLINIC | Age: 59
End: 2024-05-23
Payer: COMMERCIAL

## 2024-05-23 VITALS
BODY MASS INDEX: 23.04 KG/M2 | TEMPERATURE: 97.7 F | HEART RATE: 71 BPM | OXYGEN SATURATION: 97 % | SYSTOLIC BLOOD PRESSURE: 122 MMHG | DIASTOLIC BLOOD PRESSURE: 68 MMHG | WEIGHT: 156 LBS

## 2024-05-23 DIAGNOSIS — F41.9 ANXIETY: Primary | ICD-10-CM

## 2024-05-23 DIAGNOSIS — J30.0 VASOMOTOR RHINITIS: ICD-10-CM

## 2024-05-23 DIAGNOSIS — M20.21 HALLUX RIGIDUS OF RIGHT FOOT: ICD-10-CM

## 2024-05-23 DIAGNOSIS — E78.00 HYPERCHOLESTEROLEMIA: ICD-10-CM

## 2024-05-23 DIAGNOSIS — F51.01 PRIMARY INSOMNIA: ICD-10-CM

## 2024-05-23 DIAGNOSIS — G43.009 MIGRAINE WITHOUT AURA AND WITHOUT STATUS MIGRAINOSUS, NOT INTRACTABLE: ICD-10-CM

## 2024-05-23 DIAGNOSIS — Z13.1 DIABETES MELLITUS SCREENING: ICD-10-CM

## 2024-05-23 PROCEDURE — 99214 OFFICE O/P EST MOD 30 MIN: CPT | Performed by: NURSE PRACTITIONER

## 2024-05-23 RX ORDER — ESCITALOPRAM OXALATE 20 MG/1
20 TABLET ORAL DAILY
Qty: 90 TABLET | Refills: 1 | Status: SHIPPED | OUTPATIENT
Start: 2024-05-23

## 2024-05-23 RX ORDER — PROGESTERONE 200 MG/1
200 CAPSULE ORAL DAILY
COMMUNITY

## 2024-05-23 RX ORDER — BUTALBITAL, ACETAMINOPHEN AND CAFFEINE 50; 325; 40 MG/1; MG/1; MG/1
TABLET ORAL
Qty: 30 TABLET | Refills: 1 | Status: SHIPPED | OUTPATIENT
Start: 2024-05-23

## 2024-05-23 RX ORDER — IPRATROPIUM BROMIDE 42 UG/1
2 SPRAY, METERED NASAL 2 TIMES DAILY
Qty: 12 EACH | Refills: 5 | Status: SHIPPED | OUTPATIENT
Start: 2024-05-23 | End: 2024-11-19

## 2024-05-23 RX ORDER — NAPROXEN 500 MG/1
500 TABLET ORAL 2 TIMES DAILY WITH MEALS
Qty: 60 TABLET | Refills: 1 | Status: SHIPPED | OUTPATIENT
Start: 2024-05-23 | End: 2024-07-22

## 2024-05-23 SDOH — ECONOMIC STABILITY: FOOD INSECURITY: WITHIN THE PAST 12 MONTHS, THE FOOD YOU BOUGHT JUST DIDN'T LAST AND YOU DIDN'T HAVE MONEY TO GET MORE.: NEVER TRUE

## 2024-05-23 SDOH — ECONOMIC STABILITY: FOOD INSECURITY: WITHIN THE PAST 12 MONTHS, YOU WORRIED THAT YOUR FOOD WOULD RUN OUT BEFORE YOU GOT MONEY TO BUY MORE.: NEVER TRUE

## 2024-05-23 SDOH — ECONOMIC STABILITY: INCOME INSECURITY: HOW HARD IS IT FOR YOU TO PAY FOR THE VERY BASICS LIKE FOOD, HOUSING, MEDICAL CARE, AND HEATING?: NOT HARD AT ALL

## 2024-05-23 ASSESSMENT — PATIENT HEALTH QUESTIONNAIRE - PHQ9
8. MOVING OR SPEAKING SO SLOWLY THAT OTHER PEOPLE COULD HAVE NOTICED. OR THE OPPOSITE, BEING SO FIGETY OR RESTLESS THAT YOU HAVE BEEN MOVING AROUND A LOT MORE THAN USUAL: NOT AT ALL
SUM OF ALL RESPONSES TO PHQ QUESTIONS 1-9: 0
10. IF YOU CHECKED OFF ANY PROBLEMS, HOW DIFFICULT HAVE THESE PROBLEMS MADE IT FOR YOU TO DO YOUR WORK, TAKE CARE OF THINGS AT HOME, OR GET ALONG WITH OTHER PEOPLE: NOT DIFFICULT AT ALL
SUM OF ALL RESPONSES TO PHQ QUESTIONS 1-9: 0
3. TROUBLE FALLING OR STAYING ASLEEP: NOT AT ALL
SUM OF ALL RESPONSES TO PHQ QUESTIONS 1-9: 0
5. POOR APPETITE OR OVEREATING: NOT AT ALL
9. THOUGHTS THAT YOU WOULD BE BETTER OFF DEAD, OR OF HURTING YOURSELF: NOT AT ALL
2. FEELING DOWN, DEPRESSED OR HOPELESS: NOT AT ALL
SUM OF ALL RESPONSES TO PHQ QUESTIONS 1-9: 0
1. LITTLE INTEREST OR PLEASURE IN DOING THINGS: NOT AT ALL
7. TROUBLE CONCENTRATING ON THINGS, SUCH AS READING THE NEWSPAPER OR WATCHING TELEVISION: NOT AT ALL
4. FEELING TIRED OR HAVING LITTLE ENERGY: NOT AT ALL
SUM OF ALL RESPONSES TO PHQ9 QUESTIONS 1 & 2: 0
6. FEELING BAD ABOUT YOURSELF - OR THAT YOU ARE A FAILURE OR HAVE LET YOURSELF OR YOUR FAMILY DOWN: NOT AT ALL

## 2024-05-23 ASSESSMENT — ANXIETY QUESTIONNAIRES
IF YOU CHECKED OFF ANY PROBLEMS ON THIS QUESTIONNAIRE, HOW DIFFICULT HAVE THESE PROBLEMS MADE IT FOR YOU TO DO YOUR WORK, TAKE CARE OF THINGS AT HOME, OR GET ALONG WITH OTHER PEOPLE: NOT DIFFICULT AT ALL
6. BECOMING EASILY ANNOYED OR IRRITABLE: NOT AT ALL
1. FEELING NERVOUS, ANXIOUS, OR ON EDGE: NOT AT ALL
3. WORRYING TOO MUCH ABOUT DIFFERENT THINGS: NOT AT ALL
5. BEING SO RESTLESS THAT IT IS HARD TO SIT STILL: NOT AT ALL
4. TROUBLE RELAXING: NOT AT ALL
GAD7 TOTAL SCORE: 0
2. NOT BEING ABLE TO STOP OR CONTROL WORRYING: NOT AT ALL
7. FEELING AFRAID AS IF SOMETHING AWFUL MIGHT HAPPEN: NOT AT ALL

## 2024-05-23 ASSESSMENT — ENCOUNTER SYMPTOMS
CHEST TIGHTNESS: 0
SINUS PRESSURE: 0
SINUS PAIN: 0
RHINORRHEA: 0
WHEEZING: 0
SORE THROAT: 0
GASTROINTESTINAL NEGATIVE: 1
SHORTNESS OF BREATH: 0
COUGH: 0

## 2024-05-23 NOTE — PROGRESS NOTES
5/23/2024    Chief Complaint   Patient presents with    6 Month Follow-Up     anxiety, depression, migraine heaches & insomnia  Concerns include positional dizziness when rolling over in bed beginning 2 months ago.        Idania Batres is a 59 y.o. female, presents today for 6 month f/u anxiety, depression, migraine heaches & insomnia.  Concern for postural dizziness.      HPI   Anxiety and Depression  Current medication: Lexapro 20 mg daily and hydroxyzine 25 mg as needed for anxiety.  Patient reports anxiety and depression is well controlled with current medications dosages.  She rarely takes hydroxyzine.  Denies side effects medications.  Denies homicidal and suicidal ideation.      Migraine Headaches  Patient reports history of migraine headaches. Typically she has ~1 bad headache a month. Frequency and intensity has decreased since she went through menopause. She has no aura at onset of headache. Reports her mom and sister have a history of migraine headaches with aura. She reports nausea (without vomiting) during headaches. She takes Fioricet at onset of headache that is effective in aborting headaches. She requests a refill today.       Insomnia  Patient reports history of insomnia with difficulty falling asleep. Alprazolam has been most effective, which she takes infrequently.  She is taking as prescribed and tolerating well.  Denies side effects from medication.    PDMP Monitoring:  Last PDMP Todd as Reviewed:  Review User Review Instant Review Result   ZABRINA POWELL 5/23/2024 10:57 AM Reviewed PDMP [1]       Elevated hemoglobin  CBC is being monitored by Dr. Campuzano every 3 months.   She is established with hematologist, Dr. Davis. She is to return if hemoglobin is 17-18.    Postural dizziness  Patient reports new onset of postural dizziness when she turns over in bed.  Dizziness lasts for a couple seconds then resolves.  Denies history of vertigo.  Will continue to monitor.    Review of Systems

## 2024-06-06 DIAGNOSIS — F41.9 ANXIETY: ICD-10-CM

## 2024-06-06 DIAGNOSIS — F51.01 PRIMARY INSOMNIA: ICD-10-CM

## 2024-06-07 RX ORDER — ALPRAZOLAM 0.5 MG/1
0.5 TABLET ORAL NIGHTLY PRN
Qty: 30 TABLET | Refills: 0 | Status: SHIPPED | OUTPATIENT
Start: 2024-06-07 | End: 2024-07-07

## 2024-06-07 NOTE — TELEPHONE ENCOUNTER
1. Anxiety  -Stable  -Continue ALPRAZolam (XANAX) 0.5 MG tablet; Take 1 tablet by mouth nightly as needed for Sleep or Anxiety for up to 30 days. Max Daily Amount: 0.5 mg  Dispense: 30 tablet; Refill: 0    2. Primary insomnia  -Stable  -Continue ALPRAZolam (XANAX) 0.5 MG tablet; Take 1 tablet by mouth nightly as needed for Sleep or Anxiety for up to 30 days. Max Daily Amount: 0.5 mg  Dispense: 30 tablet; Refill: 0       PDMP Monitoring:  Last PDMP Todd as Reviewed:  Review User Review Instant Review Result   ZABRINA POWELL 6/7/2024 11:08 AM Reviewed PDMP [1]

## 2024-06-07 NOTE — TELEPHONE ENCOUNTER
Medication:   Requested Prescriptions     Pending Prescriptions Disp Refills    ALPRAZolam (XANAX) 0.5 MG tablet 30 tablet 0     Sig: Take 1 tablet by mouth nightly as needed for Sleep or Anxiety for up to 30 days. Max Daily Amount: 0.5 mg    /  Last Filled:  /    Patient Phone Number: [phone] (home)     Last appt: 5/23/2024   Next appt: 11/18/2024    Last OARRS:       9/7/2022     1:23 PM   RX Monitoring   Periodic Controlled Substance Monitoring Possible medication side effects, risk of tolerance/dependence & alternative treatments discussed.;No signs of potential drug abuse or diversion identified.     PDMP Monitoring:    Last PDMP Todd as Reviewed ([place]):  Review User Review Instant Review Result   ASHELIGH WHEELER 5/23/2024 10:57 AM Reviewed PDMP [1]     Preferred Pharmacy:   [facility] [id number] [place], [place] - [address] [facility] [phone] -  [phone]  [address] [place] [address] [place] [address]  Phone: [phone] Fax: [phone]    Recent Visits  Date Type Provider Dept   05/23/24 Office Visit Ashleigh Wheeler APRN - [facility]   11/13/23 Office Visit Ashleigh Wheeler APRN - [facility]   05/11/23 Office Visit Ashleigh Wheeler [facility]   Showing recent visits within past 540 days with a meds authorizing provider and meeting all other requirements  Future Appointments  No visits were found meeting these conditions.  Showing future appointments within next 150 days with a meds authorizing provider and meeting all other requirements     5/23/2024

## 2024-06-14 DIAGNOSIS — E78.00 HYPERCHOLESTEROLEMIA: ICD-10-CM

## 2024-06-14 DIAGNOSIS — Z13.1 DIABETES MELLITUS SCREENING: ICD-10-CM

## 2024-06-15 LAB
CHOLEST SERPL-MCNC: 220 MG/DL (ref 0–199)
EST. AVERAGE GLUCOSE BLD GHB EST-MCNC: 91.1 MG/DL
HBA1C MFR BLD: 4.8 %
HDLC SERPL-MCNC: 53 MG/DL (ref 40–60)
LDL CHOLESTEROL: 135 MG/DL
TRIGL SERPL-MCNC: 158 MG/DL (ref 0–150)
VLDLC SERPL CALC-MCNC: 32 MG/DL

## 2024-07-07 DIAGNOSIS — F51.01 PRIMARY INSOMNIA: ICD-10-CM

## 2024-07-07 DIAGNOSIS — F41.9 ANXIETY: ICD-10-CM

## 2024-07-07 DIAGNOSIS — N90.89 VULVAR LESION: ICD-10-CM

## 2024-07-08 RX ORDER — ALPRAZOLAM 0.5 MG/1
0.5 TABLET ORAL NIGHTLY PRN
Qty: 30 TABLET | Refills: 0 | Status: SHIPPED | OUTPATIENT
Start: 2024-07-08 | End: 2024-08-07

## 2024-07-08 RX ORDER — VALACYCLOVIR HYDROCHLORIDE 500 MG/1
500 TABLET, FILM COATED ORAL DAILY
Qty: 90 TABLET | Refills: 3 | Status: SHIPPED | OUTPATIENT
Start: 2024-07-08

## 2024-07-08 NOTE — TELEPHONE ENCOUNTER
Recent Visits  Date Type Provider Dept   05/23/24 Office Visit Ashleigh Wheeler APRN - [facility]   11/13/23 Office Visit Ashleigh Wheeler APRN - [facility]   05/11/23 Office Visit Ashleigh Wheeler APRN - [facility]   Showing recent visits within past 540 days with a meds authorizing provider and meeting all other requirements  Future Appointments  Date Type Provider Dept   11/18/24 Appointment Ashleigh Wheeler APRN - [facility]   Showing future appointments within next 150 days with a meds authorizing provider and meeting all other requirements

## 2024-07-08 NOTE — TELEPHONE ENCOUNTER
1. Anxiety  -Continue ALPRAZolam (XANAX) 0.5 MG tablet; Take 1 tablet by mouth nightly as needed for Sleep or Anxiety for up to 30 days. Max Daily Amount: 0.5 mg  Dispense: 30 tablet; Refill: 0    2. Primary insomnia  -Continue ALPRAZolam (XANAX) 0.5 MG tablet; Take 1 tablet by mouth nightly as needed for Sleep or Anxiety for up to 30 days. Max Daily Amount: 0.5 mg  Dispense: 30 tablet; Refill: 0       PDMP Monitoring:  Last PDMP Todd as Reviewed:  Review User Review Instant Review Result   ZABRINA POWELL 7/8/2024  8:24 AM Reviewed PDMP [1]

## 2024-07-17 DIAGNOSIS — G47.9 SLEEP DIFFICULTIES: ICD-10-CM

## 2024-07-17 DIAGNOSIS — F41.9 ANXIETY: ICD-10-CM

## 2024-07-18 RX ORDER — HYDROXYZINE HYDROCHLORIDE 25 MG/1
25 TABLET, FILM COATED ORAL EVERY 6 HOURS PRN
Qty: 90 TABLET | Refills: 1 | Status: SHIPPED | OUTPATIENT
Start: 2024-07-18

## 2024-08-07 DIAGNOSIS — F41.9 ANXIETY: ICD-10-CM

## 2024-08-07 DIAGNOSIS — F51.01 PRIMARY INSOMNIA: ICD-10-CM

## 2024-08-08 RX ORDER — ALPRAZOLAM 0.5 MG/1
0.5 TABLET ORAL NIGHTLY PRN
Qty: 30 TABLET | Refills: 0 | Status: SHIPPED | OUTPATIENT
Start: 2024-08-08 | End: 2024-09-07

## 2024-08-08 NOTE — TELEPHONE ENCOUNTER
1. Anxiety  - ALPRAZolam (XANAX) 0.5 MG tablet; Take 1 tablet by mouth nightly as needed for Sleep or Anxiety for up to 30 days. Max Daily Amount: 0.5 mg  Dispense: 30 tablet; Refill: 0    2. Primary insomnia  - ALPRAZolam (XANAX) 0.5 MG tablet; Take 1 tablet by mouth nightly as needed for Sleep or Anxiety for up to 30 days. Max Daily Amount: 0.5 mg  Dispense: 30 tablet; Refill: 0     PDMP Monitoring:    Last PDMP Todd as Reviewed:  Review User Review Instant Review Result   ZABRINA POWELL 8/8/2024 12:19 PM Reviewed PDMP [1]

## 2024-09-07 DIAGNOSIS — J30.0 VASOMOTOR RHINITIS: ICD-10-CM

## 2024-09-07 DIAGNOSIS — F51.01 PRIMARY INSOMNIA: ICD-10-CM

## 2024-09-07 DIAGNOSIS — F41.9 ANXIETY: ICD-10-CM

## 2024-09-09 RX ORDER — ALPRAZOLAM 0.5 MG
0.5 TABLET ORAL NIGHTLY PRN
Qty: 30 TABLET | Refills: 0 | Status: SHIPPED | OUTPATIENT
Start: 2024-09-09 | End: 2024-10-09

## 2024-09-09 RX ORDER — ESCITALOPRAM OXALATE 20 MG/1
20 TABLET ORAL DAILY
Qty: 90 TABLET | Refills: 1 | Status: SHIPPED | OUTPATIENT
Start: 2024-09-09

## 2024-09-09 RX ORDER — IPRATROPIUM BROMIDE 42 UG/1
2 SPRAY, METERED NASAL 2 TIMES DAILY
Qty: 12 EACH | Refills: 5 | Status: SHIPPED | OUTPATIENT
Start: 2024-09-09 | End: 2025-03-08

## 2024-10-09 DIAGNOSIS — N90.89 VULVAR LESION: ICD-10-CM

## 2024-10-09 DIAGNOSIS — F51.01 PRIMARY INSOMNIA: ICD-10-CM

## 2024-10-09 DIAGNOSIS — F41.9 ANXIETY: Primary | ICD-10-CM

## 2024-10-10 RX ORDER — VALACYCLOVIR HYDROCHLORIDE 500 MG/1
500 TABLET, FILM COATED ORAL DAILY
Qty: 90 TABLET | Refills: 3 | Status: SHIPPED | OUTPATIENT
Start: 2024-10-10

## 2024-10-11 RX ORDER — ALPRAZOLAM 0.5 MG
0.5 TABLET ORAL NIGHTLY PRN
Qty: 30 TABLET | Refills: 0 | Status: SHIPPED | OUTPATIENT
Start: 2024-10-11 | End: 2024-11-10

## 2024-10-11 NOTE — TELEPHONE ENCOUNTER
1. Anxiety  -Continue ALPRAZolam (XANAX) 0.5 MG tablet; Take 1 tablet by mouth nightly as needed for Sleep or Anxiety for up to 30 days. Max Daily Amount: 0.5 mg  Dispense: 30 tablet; Refill: 0    2. Primary insomnia  -Continue ALPRAZolam (XANAX) 0.5 MG tablet; Take 1 tablet by mouth nightly as needed for Sleep or Anxiety for up to 30 days. Max Daily Amount: 0.5 mg  Dispense: 30 tablet; Refill: 0       PDMP Monitoring:  Last PDMP Todd as Reviewed:  Review User Review Instant Review Result   ZABRINA POWELL 10/11/2024  2:09 PM Reviewed PDMP [1]

## 2024-11-02 DIAGNOSIS — G47.9 SLEEP DIFFICULTIES: ICD-10-CM

## 2024-11-02 DIAGNOSIS — F41.9 ANXIETY: ICD-10-CM

## 2024-11-04 RX ORDER — HYDROXYZINE HYDROCHLORIDE 25 MG/1
25 TABLET, FILM COATED ORAL EVERY 6 HOURS PRN
Qty: 90 TABLET | Refills: 1 | Status: SHIPPED | OUTPATIENT
Start: 2024-11-04

## 2024-11-04 NOTE — TELEPHONE ENCOUNTER
Recent Visits  Date Type Provider Dept   05/23/24 Office Visit Ashleigh Wheeler APRN - [facility]   11/13/23 Office Visit Ashleigh Wheeler APRN - [facility]   Showing recent visits within past 540 days with a meds authorizing provider and meeting all other requirements  Future Appointments  Date Type Provider Dept   11/18/24 Appointment Ashleigh Wheeler APRN - [facility]   Showing future appointments within next 150 days with a meds authorizing provider and meeting all other requirements

## 2024-11-07 DIAGNOSIS — F51.01 PRIMARY INSOMNIA: ICD-10-CM

## 2024-11-07 DIAGNOSIS — F41.9 ANXIETY: ICD-10-CM

## 2024-11-08 RX ORDER — ALPRAZOLAM 0.5 MG
0.5 TABLET ORAL NIGHTLY PRN
Qty: 30 TABLET | Refills: 0 | Status: SHIPPED | OUTPATIENT
Start: 2024-11-08 | End: 2024-12-08

## 2024-11-08 NOTE — TELEPHONE ENCOUNTER
1. Anxiety  -Continue ALPRAZolam (XANAX) 0.5 MG tablet; Take 1 tablet by mouth nightly as needed for Sleep or Anxiety for up to 30 days. Max Daily Amount: 0.5 mg  Dispense: 30 tablet; Refill: 0    2. Primary insomnia  -Continue ALPRAZolam (XANAX) 0.5 MG tablet; Take 1 tablet by mouth nightly as needed for Sleep or Anxiety for up to 30 days. Max Daily Amount: 0.5 mg  Dispense: 30 tablet; Refill: 0       PDMP Monitoring:  Last PDMP Todd as Reviewed:  Review User Review Instant Review Result   ZABRINA POWELL 11/8/2024 11:39 AM Reviewed PDMP [1]

## 2024-11-23 DIAGNOSIS — J30.0 VASOMOTOR RHINITIS: ICD-10-CM

## 2024-11-25 RX ORDER — IPRATROPIUM BROMIDE 42 UG/1
2 SPRAY, METERED NASAL 2 TIMES DAILY
Qty: 12 EACH | Refills: 5 | Status: SHIPPED | OUTPATIENT
Start: 2024-11-25 | End: 2025-05-24

## 2024-11-25 NOTE — TELEPHONE ENCOUNTER
Recent Visits  Date Type Provider Dept   05/23/24 Office Visit Ashleigh Wheeler APRN - [facility]   11/13/23 Office Visit Ashleigh Wheeler APRN - [facility]   Showing recent visits within past 540 days with a meds authorizing provider and meeting all other requirements  Future Appointments  Date Type Provider Dept   12/13/24 Appointment Ashleigh Wheeler APRN - [facility]   Showing future appointments within next 150 days with a meds authorizing provider and meeting all other requirements

## 2024-12-05 ENCOUNTER — OFFICE VISIT (OUTPATIENT)
Dept: PRIMARY CARE CLINIC | Age: 59
End: 2024-12-05
Payer: COMMERCIAL

## 2024-12-05 VITALS
WEIGHT: 153.3 LBS | OXYGEN SATURATION: 97 % | BODY MASS INDEX: 22.71 KG/M2 | TEMPERATURE: 98 F | HEART RATE: 72 BPM | SYSTOLIC BLOOD PRESSURE: 120 MMHG | HEIGHT: 69 IN | DIASTOLIC BLOOD PRESSURE: 76 MMHG

## 2024-12-05 DIAGNOSIS — B97.89 SORE THROAT (VIRAL): ICD-10-CM

## 2024-12-05 DIAGNOSIS — H92.01 OTALGIA OF RIGHT EAR: ICD-10-CM

## 2024-12-05 DIAGNOSIS — J02.8 SORE THROAT (VIRAL): ICD-10-CM

## 2024-12-05 DIAGNOSIS — J06.9 VIRAL UPPER RESPIRATORY INFECTION: Primary | ICD-10-CM

## 2024-12-05 LAB
INFLUENZA A ANTIGEN, POC: NEGATIVE
INFLUENZA B ANTIGEN, POC: NEGATIVE
LOT EXPIRE DATE: NORMAL
LOT KIT NUMBER: NORMAL
S PYO AG THROAT QL: NORMAL
SARS-COV-2 RNA, POC: NEGATIVE
VALID INTERNAL CONTROL: NORMAL
VENDOR AND KIT NAME POC: NORMAL

## 2024-12-05 PROCEDURE — 87880 STREP A ASSAY W/OPTIC: CPT | Performed by: NURSE PRACTITIONER

## 2024-12-05 PROCEDURE — 87428 SARSCOV & INF VIR A&B AG IA: CPT | Performed by: NURSE PRACTITIONER

## 2024-12-05 PROCEDURE — 99213 OFFICE O/P EST LOW 20 MIN: CPT | Performed by: NURSE PRACTITIONER

## 2024-12-05 ASSESSMENT — ENCOUNTER SYMPTOMS
RHINORRHEA: 0
VOMITING: 0
SWOLLEN GLANDS: 0
COUGH: 0
NAUSEA: 0
SORE THROAT: 1

## 2024-12-05 NOTE — PROGRESS NOTES
Pulmonary:      Effort: Pulmonary effort is normal.      Breath sounds: Normal breath sounds.   Musculoskeletal:         General: Normal range of motion.      Cervical back: Normal range of motion and neck supple.      Right lower leg: No edema.      Left lower leg: No edema.   Lymphadenopathy:      Cervical: No cervical adenopathy.   Skin:     General: Skin is warm.   Neurological:      General: No focal deficit present.      Mental Status: She is alert and oriented to person, place, and time.   Psychiatric:         Mood and Affect: Mood normal.         Behavior: Behavior normal.         Results for orders placed or performed in visit on 24   POCT rapid strep A   Result Value Ref Range    Strep A Ag None Detected None Detected      AMB POC SARS-COV-2 AND INFLUENZA A/B   Component  Ref Range & Units 24 1625   Internal Control pass   Lot/Kit Number [id number]   Lot/Kit  date: 2025   SARS-COV-2 RNA, POC Negative   Influenza A Antigen, POC  Not Detected Negative   Influenza B Antigen, POC  Not Detected Negative   Vendor and kit name Ver[employer]       ASSESSMENT/PLAN:  1. Viral upper respiratory infection  -Acute  -Continue Pseudoephedrine-APAP-DM (DAYQUIL MULTI-SYMPTOM COLD/FLU PO); Take 2 capsules by mouth 4 times daily as needed (Nasal congestion, sore throat, cough)  -Continue DM-Doxylamine-Acetaminophen (NYQUIL COLD & FLU PO); Take 2 capsules by mouth nightly as needed (congestion)  -Discussed signs and symptoms of serotonin syndrome if taking dextromethorphan and Lexapro; patient verbalized understanding and agreed to stop taking DayQuil NyQuil if symptoms develop.  - AMB POC SARS-COV-2 AND INFLUENZA A/B    2. Sore throat (viral)  -Acute  - POCT rapid strep A    3. Otalgia of right ear  -Acute       Return if symptoms worsen or fail to improve.       Electronically signed by DEXTER GOLDSMITH CNP on 2024 at 3:48 PM       This dictation was generated by voice recognition computer software.

## 2024-12-06 ASSESSMENT — ENCOUNTER SYMPTOMS
SINUS PRESSURE: 0
WHEEZING: 0
SHORTNESS OF BREATH: 0
APNEA: 0
TROUBLE SWALLOWING: 0
CHEST TIGHTNESS: 0
SINUS PAIN: 0

## 2024-12-10 ENCOUNTER — PATIENT MESSAGE (OUTPATIENT)
Dept: PRIMARY CARE CLINIC | Age: 59
End: 2024-12-10

## 2024-12-10 ENCOUNTER — TELEPHONE (OUTPATIENT)
Dept: PRIMARY CARE CLINIC | Age: 59
End: 2024-12-10

## 2024-12-10 DIAGNOSIS — Z13.1 DIABETES MELLITUS SCREENING: Primary | ICD-10-CM

## 2024-12-10 DIAGNOSIS — Z00.00 PERIODIC HEALTH ASSESSMENT, GENERAL SCREENING, ADULT: ICD-10-CM

## 2024-12-10 DIAGNOSIS — F51.01 PRIMARY INSOMNIA: ICD-10-CM

## 2024-12-10 DIAGNOSIS — M25.541 ARTHRALGIA OF BOTH HANDS: Primary | ICD-10-CM

## 2024-12-10 DIAGNOSIS — Z13.29 THYROID DISORDER SCREENING: ICD-10-CM

## 2024-12-10 DIAGNOSIS — M25.542 ARTHRALGIA OF BOTH HANDS: Primary | ICD-10-CM

## 2024-12-10 DIAGNOSIS — F41.9 ANXIETY: Primary | ICD-10-CM

## 2024-12-10 DIAGNOSIS — Z13.220 LIPID SCREENING: ICD-10-CM

## 2024-12-10 DIAGNOSIS — Z13.21 ENCOUNTER FOR VITAMIN DEFICIENCY SCREENING: ICD-10-CM

## 2024-12-10 NOTE — TELEPHONE ENCOUNTER
Idania Ramos stopped by to get some labs and Ashleigh didn't have any ordered for her visit on Friday. Please call to advise.

## 2024-12-10 NOTE — TELEPHONE ENCOUNTER
Medication:   Requested Prescriptions     Pending Prescriptions Disp Refills    ALPRAZolam (XANAX) 0.5 MG tablet 30 tablet 0     Sig: Take 1 tablet by mouth nightly as needed for Sleep or Anxiety for up to 30 days. Max Daily Amount: 0.5 mg      Last Filled:  11/8/24    Patient Phone Number: [phone] (home)     Last appt: 12/5/2024   Next appt: 12/13/2024    Last OARRS:       9/7/2022     1:23 PM   RX Monitoring   Periodic Controlled Substance Monitoring Possible medication side effects, risk of tolerance/dependence & alternative treatments discussed.;No signs of potential drug abuse or diversion identified.     PDMP Monitoring:    Last PDMP Todd as Reviewed ([place]):  Review User Review Instant Review Result   ASHLEIGH WHEELER 11/8/2024 11:39 AM Reviewed PDMP [1]     Preferred Pharmacy:   [facility] [id number] [facility] [address] [facility] [phone] -  [phone]  [address] [place] [address] [place] [address]  Phone: [phone] Fax: [phone]    Recent Visits  Date Type Provider Dept   12/05/24 Office Visit Ashleigh Wheeler APRN - [facility]   05/23/24 Office Visit Ashleigh Wheeler APRN - [facility]   11/13/23 Office Visit Ashleigh Wheeler APRN - [facility]   Showing recent visits within past 540 days with a meds authorizing provider and meeting all other requirements  Future Appointments  Date Type Provider Dept   12/13/24 Appointment Ashleigh Wheeler APRN - [facility]   Showing future appointments within next 150 days with a meds authorizing provider and meeting all other requirements     12/5/2024

## 2024-12-11 RX ORDER — ALPRAZOLAM 0.5 MG
0.5 TABLET ORAL NIGHTLY PRN
Qty: 30 TABLET | Refills: 0 | Status: SHIPPED | OUTPATIENT
Start: 2024-12-11 | End: 2024-12-13 | Stop reason: SDUPTHER

## 2024-12-11 NOTE — TELEPHONE ENCOUNTER
1. Anxiety  - ALPRAZolam (XANAX) 0.5 MG tablet; Take 1 tablet by mouth nightly as needed for Sleep or Anxiety for up to 30 days. Max Daily Amount: 0.5 mg  Dispense: 30 tablet; Refill: 0    2. Primary insomnia  - ALPRAZolam (XANAX) 0.5 MG tablet; Take 1 tablet by mouth nightly as needed for Sleep or Anxiety for up to 30 days. Max Daily Amount: 0.5 mg  Dispense: 30 tablet; Refill: 0    PDMP Monitoring:  Last PDMP Todd as Reviewed:  Review User Review Instant Review Result   ZABRINA POWELL 12/11/2024  8:05 AM Reviewed PDMP [1]

## 2024-12-11 NOTE — TELEPHONE ENCOUNTER
1. Arthralgia of both hands  - JITENDRA; Future  - C-Reactive Protein; Future  - Rheumatoid Factor; Future  - Sedimentation Rate; Future

## 2024-12-11 NOTE — TELEPHONE ENCOUNTER
1. Diabetes mellitus screening  - Hemoglobin A1C; Future    2. Lipid screening  - Lipid, Fasting; Future    3. Periodic health assessment, general screening, adult  - Comprehensive Metabolic Panel, Fasting; Future    4. Encounter for vitamin deficiency screening  - Vitamin D 25 Hydroxy; Future    5. Thyroid disorder screening  - TSH with Reflex to FT4; Future

## 2024-12-11 NOTE — TELEPHONE ENCOUNTER
I spoke with Idania Ramos to let her know Sotelo ordered her fasting labs and not to eat or drink 8 to 10 hrs prior and that water was ok.

## 2024-12-12 DIAGNOSIS — Z13.1 DIABETES MELLITUS SCREENING: ICD-10-CM

## 2024-12-12 DIAGNOSIS — Z00.00 PERIODIC HEALTH ASSESSMENT, GENERAL SCREENING, ADULT: ICD-10-CM

## 2024-12-12 DIAGNOSIS — M25.541 ARTHRALGIA OF BOTH HANDS: ICD-10-CM

## 2024-12-12 DIAGNOSIS — M25.542 ARTHRALGIA OF BOTH HANDS: ICD-10-CM

## 2024-12-12 DIAGNOSIS — Z13.220 LIPID SCREENING: ICD-10-CM

## 2024-12-12 DIAGNOSIS — Z13.21 ENCOUNTER FOR VITAMIN DEFICIENCY SCREENING: ICD-10-CM

## 2024-12-12 DIAGNOSIS — Z13.29 THYROID DISORDER SCREENING: ICD-10-CM

## 2024-12-12 LAB
25(OH)D3 SERPL-MCNC: 79.2 NG/ML
ALBUMIN SERPL-MCNC: 4.5 G/DL (ref 3.4–5)
ALBUMIN/GLOB SERPL: 2.5 {RATIO} (ref 1.1–2.2)
ALP SERPL-CCNC: 56 U/L (ref 40–129)
ALT SERPL-CCNC: 21 U/L (ref 10–40)
ANION GAP SERPL CALCULATED.3IONS-SCNC: 12 MMOL/L (ref 3–16)
AST SERPL-CCNC: 27 U/L (ref 15–37)
BILIRUB SERPL-MCNC: 0.4 MG/DL (ref 0–1)
BUN SERPL-MCNC: 18 MG/DL (ref 7–20)
CALCIUM SERPL-MCNC: 9.5 MG/DL (ref 8.3–10.6)
CHLORIDE SERPL-SCNC: 102 MMOL/L (ref 99–110)
CHOLEST SERPL-MCNC: 225 MG/DL (ref 0–199)
CO2 SERPL-SCNC: 25 MMOL/L (ref 21–32)
CREAT SERPL-MCNC: 0.9 MG/DL (ref 0.6–1.2)
CRP SERPL-MCNC: <3 MG/L (ref 0–5.1)
ERYTHROCYTE [SEDIMENTATION RATE] IN BLOOD BY WESTERGREN METHOD: 1 MM/HR (ref 0–30)
EST. AVERAGE GLUCOSE BLD GHB EST-MCNC: 82.5 MG/DL
GFR SERPLBLD CREATININE-BSD FMLA CKD-EPI: 73 ML/MIN/{1.73_M2}
GLUCOSE P FAST SERPL-MCNC: 86 MG/DL (ref 70–99)
HBA1C MFR BLD: 4.5 %
HDLC SERPL-MCNC: 48 MG/DL (ref 40–60)
LDL CHOLESTEROL: 141 MG/DL
POTASSIUM SERPL-SCNC: 4.3 MMOL/L (ref 3.5–5.1)
PROT SERPL-MCNC: 6.3 G/DL (ref 6.4–8.2)
RHEUMATOID FACT SER IA-ACNC: <10 IU/ML
SODIUM SERPL-SCNC: 139 MMOL/L (ref 136–145)
TRIGL SERPL-MCNC: 181 MG/DL (ref 0–150)
TSH SERPL DL<=0.005 MIU/L-ACNC: 1.44 UIU/ML (ref 0.27–4.2)
VLDLC SERPL CALC-MCNC: 36 MG/DL

## 2024-12-13 ENCOUNTER — OFFICE VISIT (OUTPATIENT)
Dept: PRIMARY CARE CLINIC | Age: 59
End: 2024-12-13
Payer: COMMERCIAL

## 2024-12-13 VITALS
BODY MASS INDEX: 23.34 KG/M2 | WEIGHT: 157.6 LBS | DIASTOLIC BLOOD PRESSURE: 72 MMHG | HEART RATE: 71 BPM | TEMPERATURE: 97.7 F | OXYGEN SATURATION: 98 % | SYSTOLIC BLOOD PRESSURE: 112 MMHG | HEIGHT: 69 IN

## 2024-12-13 DIAGNOSIS — F51.01 PRIMARY INSOMNIA: ICD-10-CM

## 2024-12-13 DIAGNOSIS — R79.89 ELEVATED PLATELET COUNT: ICD-10-CM

## 2024-12-13 DIAGNOSIS — G43.009 MIGRAINE WITHOUT AURA AND WITHOUT STATUS MIGRAINOSUS, NOT INTRACTABLE: ICD-10-CM

## 2024-12-13 DIAGNOSIS — M25.542 ARTHRALGIA OF BOTH HANDS: ICD-10-CM

## 2024-12-13 DIAGNOSIS — F41.9 ANXIETY: Primary | ICD-10-CM

## 2024-12-13 DIAGNOSIS — M25.541 ARTHRALGIA OF BOTH HANDS: ICD-10-CM

## 2024-12-13 DIAGNOSIS — J30.0 VASOMOTOR RHINITIS: ICD-10-CM

## 2024-12-13 LAB — ANA SER QL IA: NEGATIVE

## 2024-12-13 PROCEDURE — 99214 OFFICE O/P EST MOD 30 MIN: CPT | Performed by: NURSE PRACTITIONER

## 2024-12-13 RX ORDER — ALPRAZOLAM 0.5 MG
0.5 TABLET ORAL NIGHTLY PRN
Qty: 30 TABLET | Refills: 2 | Status: SHIPPED | OUTPATIENT
Start: 2024-12-13 | End: 2025-03-13

## 2024-12-13 RX ORDER — ESCITALOPRAM OXALATE 20 MG/1
20 TABLET ORAL DAILY
Qty: 90 TABLET | Refills: 1 | Status: SHIPPED | OUTPATIENT
Start: 2024-12-13

## 2024-12-13 RX ORDER — IPRATROPIUM BROMIDE 42 UG/1
2 SPRAY, METERED NASAL 2 TIMES DAILY
Qty: 12 EACH | Refills: 5 | Status: SHIPPED | OUTPATIENT
Start: 2024-12-13 | End: 2025-06-11

## 2024-12-13 ASSESSMENT — PATIENT HEALTH QUESTIONNAIRE - PHQ9
SUM OF ALL RESPONSES TO PHQ QUESTIONS 1-9: 0
10. IF YOU CHECKED OFF ANY PROBLEMS, HOW DIFFICULT HAVE THESE PROBLEMS MADE IT FOR YOU TO DO YOUR WORK, TAKE CARE OF THINGS AT HOME, OR GET ALONG WITH OTHER PEOPLE: NOT DIFFICULT AT ALL
SUM OF ALL RESPONSES TO PHQ QUESTIONS 1-9: 0
6. FEELING BAD ABOUT YOURSELF - OR THAT YOU ARE A FAILURE OR HAVE LET YOURSELF OR YOUR FAMILY DOWN: NOT AT ALL
SUM OF ALL RESPONSES TO PHQ9 QUESTIONS 1 & 2: 0
4. FEELING TIRED OR HAVING LITTLE ENERGY: NOT AT ALL
SUM OF ALL RESPONSES TO PHQ QUESTIONS 1-9: 0
3. TROUBLE FALLING OR STAYING ASLEEP: NOT AT ALL
1. LITTLE INTEREST OR PLEASURE IN DOING THINGS: NOT AT ALL
8. MOVING OR SPEAKING SO SLOWLY THAT OTHER PEOPLE COULD HAVE NOTICED. OR THE OPPOSITE, BEING SO FIGETY OR RESTLESS THAT YOU HAVE BEEN MOVING AROUND A LOT MORE THAN USUAL: NOT AT ALL
2. FEELING DOWN, DEPRESSED OR HOPELESS: NOT AT ALL
SUM OF ALL RESPONSES TO PHQ QUESTIONS 1-9: 0
9. THOUGHTS THAT YOU WOULD BE BETTER OFF DEAD, OR OF HURTING YOURSELF: NOT AT ALL
7. TROUBLE CONCENTRATING ON THINGS, SUCH AS READING THE NEWSPAPER OR WATCHING TELEVISION: NOT AT ALL

## 2024-12-13 ASSESSMENT — ANXIETY QUESTIONNAIRES
7. FEELING AFRAID AS IF SOMETHING AWFUL MIGHT HAPPEN: NOT AT ALL
IF YOU CHECKED OFF ANY PROBLEMS ON THIS QUESTIONNAIRE, HOW DIFFICULT HAVE THESE PROBLEMS MADE IT FOR YOU TO DO YOUR WORK, TAKE CARE OF THINGS AT HOME, OR GET ALONG WITH OTHER PEOPLE: NOT DIFFICULT AT ALL
GAD7 TOTAL SCORE: 0
6. BECOMING EASILY ANNOYED OR IRRITABLE: NOT AT ALL
5. BEING SO RESTLESS THAT IT IS HARD TO SIT STILL: NOT AT ALL
1. FEELING NERVOUS, ANXIOUS, OR ON EDGE: NOT AT ALL
4. TROUBLE RELAXING: NOT AT ALL
2. NOT BEING ABLE TO STOP OR CONTROL WORRYING: NOT AT ALL

## 2024-12-13 NOTE — RESULT ENCOUNTER NOTE
Results reviewed with patient during office visit on 12/13/2024    The 10-year ASCVD risk score (Mone JOHN, et al., 2019) is: 3.1% -- Statin not indicated.  Continue healthy diet and regular exercise

## 2024-12-13 NOTE — PROGRESS NOTES
12/13/2024    Chief Complaint   Patient presents with    6 Month Follow-Up     Anxiety, depression, migraine headaches and insomnia       Idania Batres is a 59 y.o. female, presents today for 6 month f/u anxiety, depression, migraine heaches & insomnia.      HPI   Anxiety and Depression  Current medication: Lexapro 20 mg daily and hydroxyzine 25 mg as needed for anxiety.  Patient reports anxiety and depression is well controlled with current medications dosages.  She rarely takes hydroxyzine.  Denies side effects medications.  Denies homicidal and suicidal ideation.      Migraine Headaches  Patient reports history of migraine headaches. Reports migraines are very rare. Reports occasional stress headaches typically once monthly. Frequency and intensity has decreased since she went through menopause. She has no aura at onset of headache. Reports her mom and sister have a history of migraine headaches with aura. She reports nausea (without vomiting) during headaches. She takes Fioricet at onset of headache that is effective in aborting headaches.       Insomnia  Patient reports history of insomnia with difficulty falling asleep. Alprazolam has been most effective, which she takes infrequently.  She is taking as prescribed and tolerating well.  Denies side effects from medication.    PDMP Monitoring:  Last PDMP Todd as Reviewed:  Review User Review Instant Review Result   ZABRINA POWELL 12/13/2024 12:25 PM Reviewed PDMP [1]       Bilateral hand pain  Bilateral hand pain started a couple months ago and has progressively worsening.   No longer taking Voltaren 75 mg daily- Patient is unsure why she was taking and when she stopped medication. Patient requests prescription for Voltaren 1% gel and will take Naproxyn 500 mg BID PRN hand pain.     Patient agrees to plan with verbal understanding.    Review of Systems   Constitutional:  Negative for activity change, appetite change, fatigue and unexpected weight change.

## 2024-12-14 LAB
BASOPHILS # BLD: 0 K/UL (ref 0–0.2)
BASOPHILS NFR BLD: 0.7 %
DEPRECATED RDW RBC AUTO: 12.6 % (ref 12.4–15.4)
EOSINOPHIL # BLD: 0 K/UL (ref 0–0.6)
EOSINOPHIL NFR BLD: 0.7 %
HCT VFR BLD AUTO: 47.4 % (ref 36–48)
HGB BLD-MCNC: 16.9 G/DL (ref 12–16)
LYMPHOCYTES # BLD: 1.6 K/UL (ref 1–5.1)
LYMPHOCYTES NFR BLD: 22.6 %
MCH RBC QN AUTO: 34.3 PG (ref 26–34)
MCHC RBC AUTO-ENTMCNC: 35.6 G/DL (ref 31–36)
MCV RBC AUTO: 96.3 FL (ref 80–100)
MONOCYTES # BLD: 0.5 K/UL (ref 0–1.3)
MONOCYTES NFR BLD: 6.4 %
NEUTROPHILS # BLD: 5 K/UL (ref 1.7–7.7)
NEUTROPHILS NFR BLD: 69.6 %
PLATELET # BLD AUTO: 246 K/UL (ref 135–450)
PMV BLD AUTO: 9 FL (ref 5–10.5)
RBC # BLD AUTO: 4.93 M/UL (ref 4–5.2)
WBC # BLD AUTO: 7.2 K/UL (ref 4–11)

## 2025-01-07 ENCOUNTER — PATIENT MESSAGE (OUTPATIENT)
Dept: PRIMARY CARE CLINIC | Age: 60
End: 2025-01-07

## 2025-01-07 DIAGNOSIS — F51.01 PRIMARY INSOMNIA: ICD-10-CM

## 2025-01-07 DIAGNOSIS — F41.9 ANXIETY: ICD-10-CM

## 2025-01-07 DIAGNOSIS — M20.21 HALLUX RIGIDUS OF RIGHT FOOT: ICD-10-CM

## 2025-01-07 RX ORDER — NAPROXEN 500 MG/1
500 TABLET ORAL 2 TIMES DAILY WITH MEALS
Qty: 60 TABLET | Refills: 1 | Status: CANCELLED | OUTPATIENT
Start: 2025-01-07 | End: 2025-03-08

## 2025-01-08 DIAGNOSIS — M20.21 HALLUX RIGIDUS OF RIGHT FOOT: ICD-10-CM

## 2025-01-08 RX ORDER — NAPROXEN 500 MG/1
500 TABLET ORAL 2 TIMES DAILY WITH MEALS
Qty: 60 TABLET | Refills: 1 | Status: SHIPPED | OUTPATIENT
Start: 2025-01-08 | End: 2025-03-09

## 2025-01-08 RX ORDER — ALPRAZOLAM 0.5 MG
0.5 TABLET ORAL NIGHTLY PRN
Qty: 30 TABLET | Refills: 2 | Status: SHIPPED | OUTPATIENT
Start: 2025-01-08 | End: 2025-04-08

## 2025-01-08 NOTE — TELEPHONE ENCOUNTER
Medication:   Requested Prescriptions     Pending Prescriptions Disp Refills    ALPRAZolam (XANAX) 0.5 MG tablet 30 tablet 2     Sig: Take 1 tablet by mouth nightly as needed for Sleep or Anxiety for up to 90 days. Max Daily Amount: 0.5 mg      Last Filled:  12/13/2024    Patient Phone Number: [phone] (home)     Last appt: 12/13/2024   Next appt: 6/16/2025    Last OARRS:       9/7/2022     1:23 PM   RX Monitoring   Periodic Controlled Substance Monitoring Possible medication side effects, risk of tolerance/dependence & alternative treatments discussed.;No signs of potential drug abuse or diversion identified.     PDMP Monitoring:    Last PDMP [place] as Reviewed ([place]):  Review User Review Instant Review Result   ASHLEIGH WHEELER 12/13/2024 12:25 PM Reviewed PDMP [1]     Preferred Pharmacy:   [facility] [id number] [facility] [address] [facility] [phone] -  [phone]  [address] [place] [address] [place] [address]  Phone: [phone] Fax: [phone]    Recent Visits  Date Type Provider Dept   12/13/24 Office Visit Ashleigh Wheeler APRN - [facility]   12/05/24 Office Visit Ashleigh Wheeler APR[facility]   05/23/24 Office Visit Ashleigh Wheeler APR[facility]   11/13/23 Office Visit Ashleigh Wheeler APR[facility]   Showing recent visits within past 540 days with a meds authorizing provider and meeting all other requirements  Future Appointments  No visits were found meeting these conditions.  Showing future appointments within next 150 days with a meds authorizing provider and meeting all other requirements     12/13/2024

## 2025-01-08 NOTE — TELEPHONE ENCOUNTER
1.  Anxiety  -Continue ALPRAZolam (XANAX) 0.5 MG tablet; Take 1 tablet by mouth nightly as needed for Sleep or Anxiety for up to 90 days. Max Daily Amount: 0.5 mg  Dispense: 30 tablet; Refill: 2    2. Primary insomnia  -Continue ALPRAZolam (XANAX) 0.5 MG tablet; Take 1 tablet by mouth nightly as needed for Sleep or Anxiety for up to 90 days. Max Daily Amount: 0.5 mg  Dispense: 30 tablet; Refill: 2     PDMP Monitoring:  Last PDMP Todd as Reviewed:  Review User Review Instant Review Result   ZABRINA POWELL 1/8/2025  9:21 AM Reviewed PDMP [1]

## 2025-01-08 NOTE — TELEPHONE ENCOUNTER
Medication:   Requested Prescriptions     Pending Prescriptions Disp Refills    naproxen (NAPROSYN) 500 MG tablet 60 tablet 1     Sig: Take 1 tablet by mouth 2 times daily (with meals)      Last Filled:  5/23/24    Patient Phone Number: [phone] (home)     Last appt: 12/13/2024   Next appt: 6/16/2025    Last OARRS:       9/7/2022     1:23 PM   RX Monitoring   Periodic Controlled Substance Monitoring Possible medication side effects, risk of tolerance/dependence & alternative treatments discussed.;No signs of potential drug abuse or diversion identified.     PDMP Monitoring:    Last PDMP Todd as Reviewed ([place]):  Review User Review Instant Review Result   ASHLEIGH WHEELER 12/13/2024 12:25 PM Reviewed PDMP [1]     Preferred Pharmacy:   [facility] [id number] [place], [place] - [address] [facility] [phone] - F [phone]  [address] [facility] [place] [address]  Phone: [phone] Fax: [phone]    Recent Visits  Date Type Provider Dept   12/13/24 Office Visit Ashleigh Wheeler APR[facility]   12/05/24 Office Visit Ashleigh Wheeler APR[facility]   05/23/24 Office Visit Ashleigh Wheeler APR[facility]   11/13/23 Office Visit Ashleigh Wheeler APR[facility]   Showing recent visits within past 540 days with a meds authorizing provider and meeting all other requirements  Future Appointments  No visits were found meeting these conditions.  Showing future appointments within next 150 days with a meds authorizing provider and meeting all other requirements     12/13/2024

## 2025-01-27 DIAGNOSIS — B34.9 VIRAL ILLNESS: ICD-10-CM

## 2025-01-27 DIAGNOSIS — G47.9 SLEEP DIFFICULTIES: ICD-10-CM

## 2025-01-27 DIAGNOSIS — F41.9 ANXIETY: ICD-10-CM

## 2025-01-27 RX ORDER — ACETAMINOPHEN 500 MG
TABLET ORAL
Qty: 120 TABLET | Refills: 1 | Status: SHIPPED | OUTPATIENT
Start: 2025-01-27

## 2025-01-27 RX ORDER — HYDROXYZINE HYDROCHLORIDE 25 MG/1
25 TABLET, FILM COATED ORAL EVERY 6 HOURS PRN
Qty: 90 TABLET | Refills: 1 | Status: SHIPPED | OUTPATIENT
Start: 2025-01-27

## 2025-02-10 DIAGNOSIS — F51.01 PRIMARY INSOMNIA: ICD-10-CM

## 2025-02-10 DIAGNOSIS — F41.9 ANXIETY: Primary | ICD-10-CM

## 2025-02-10 RX ORDER — ALPRAZOLAM 0.5 MG
0.5 TABLET ORAL NIGHTLY PRN
Qty: 30 TABLET | Refills: 2 | Status: SHIPPED | OUTPATIENT
Start: 2025-02-10 | End: 2025-03-10 | Stop reason: SDUPTHER

## 2025-02-10 NOTE — TELEPHONE ENCOUNTER
Recent Visits  Date Type Provider Dept   12/13/24 Office Visit Ashleigh Wheeler APRN [facility]   12/05/24 Office Visit Ashleigh Wheeler APR[facility]   05/23/24 Office Visit Ashleigh Wheeler APR[facility]   11/13/23 Office Visit Ashleigh Wheeler APR[facility]   Showing recent visits within past 540 days with a meds authorizing provider and meeting all other requirements  Future Appointments  Date Type Provider Dept   06/16/25 Appointment Ashleigh Wheeler APR[facility]   Showing future appointments within next 150 days with a meds authorizing provider and meeting all other requirements

## 2025-02-10 NOTE — TELEPHONE ENCOUNTER
LM with Idania Ramos that her Xanax prescription was sent to pharmacy. Prescription has 2 refills- Request refill from pharmacy for the next 2 months.

## 2025-02-10 NOTE — TELEPHONE ENCOUNTER
1. Anxiety  - ALPRAZolam (XANAX) 0.5 MG tablet; Take 1 tablet by mouth nightly as needed for Sleep or Anxiety for up to 90 days. Max Daily Amount: 0.5 mg  Dispense: 30 tablet; Refill: 2    2. Primary insomnia  - ALPRAZolam (XANAX) 0.5 MG tablet; Take 1 tablet by mouth nightly as needed for Sleep or Anxiety for up to 90 days. Max Daily Amount: 0.5 mg  Dispense: 30 tablet; Refill: 2     PDMP Monitoring:  Last PDMP Todd as Reviewed:  Review User Review Instant Review Result   ZABRINA POWELL 2/10/2025  1:23 PM Reviewed PDMP [1]

## 2025-03-05 DIAGNOSIS — F41.9 ANXIETY: ICD-10-CM

## 2025-03-05 RX ORDER — ESCITALOPRAM OXALATE 20 MG/1
20 TABLET ORAL DAILY
Qty: 90 TABLET | Refills: 1 | Status: SHIPPED | OUTPATIENT
Start: 2025-03-05

## 2025-03-05 NOTE — TELEPHONE ENCOUNTER
Medication:   Requested Prescriptions     Pending Prescriptions Disp Refills    escitalopram (LEXAPRO) 20 MG tablet 90 tablet 1     Sig: Take 1 tablet by mouth daily      Last Filled:  12/13/24    Patient Phone Number: [phone] (home)     Last appt: 12/13/2024   Next appt: 6/16/2025    Last OARRS:       9/7/2022     1:23 PM   RX Monitoring   Periodic Controlled Substance Monitoring Possible medication side effects, risk of tolerance/dependence & alternative treatments discussed.;No signs of potential drug abuse or diversion identified.     PDMP Monitoring:    Last PDMP Todd as Reviewed ([place]):  Review User Review Instant Review Result   ASHLEIGH WHEELER 2/10/2025  1:23 PM Reviewed PDMP [1]     Preferred Pharmacy:   [facility] [id number] [place], [place] - [address] [facility] -  [phone] - F [phone]  [address] [facility] [address]  Phone: [phone] Fax: [phone]    Recent Visits  Date Type Provider Dept   12/13/24 Office Visit Ashleigh Wheeler APR[facility]   12/05/24 Office Visit Ashleigh Wheeler APR[facility]   05/23/24 Office Visit Ashleigh Wheeler APR[facility]   11/13/23 Office Visit Ashleigh Wheeler APR[facility]   Showing recent visits within past 540 days with a meds authorizing provider and meeting all other requirements  Future Appointments  Date Type Provider Dept   06/16/25 Appointment Ashleigh Wheeler APR[facility]   Showing future appointments within next 150 days with a meds authorizing provider and meeting all other requirements     12/13/2024

## 2025-03-10 DIAGNOSIS — F51.01 PRIMARY INSOMNIA: ICD-10-CM

## 2025-03-10 DIAGNOSIS — F41.9 ANXIETY: Primary | ICD-10-CM

## 2025-03-11 NOTE — TELEPHONE ENCOUNTER
Medication:   Requested Prescriptions     Pending Prescriptions Disp Refills    ALPRAZolam (XANAX) 0.5 MG tablet 30 tablet 2     Sig: Take 1 tablet by mouth nightly as needed for Sleep or Anxiety for up to 90 days. Max Daily Amount: 0.5 mg      Last Filled:  2/10/25    Patient Phone Number: [phone] (home)     Last appt: 12/13/2024   Next appt: 6/16/2025    Last OARRS:       9/7/2022     1:23 PM   RX Monitoring   Periodic Controlled Substance Monitoring Possible medication side effects, risk of tolerance/dependence & alternative treatments discussed.;No signs of potential drug abuse or diversion identified.     PDMP Monitoring:    Last PDMP Todd as Reviewed ([place]):  Review User Review Instant Review Result   ASHLEIGH WHEELER 2/10/2025  1:23 PM Reviewed PDMP [1]     Preferred Pharmacy:   [facility] [id number] [facility] [address] [facility] [phone] -  [phone]  [address] [place] [address] [place] [address]  Phone: [phone] Fax: [phone]    Recent Visits  Date Type Provider Dept   12/13/24 Office Visit Ashleigh Wheeler APR[facility]   12/05/24 Office Visit Ashleigh Wheeler APR[facility]   05/23/24 Office Visit Ashleigh Wheeler APR[facility]   11/13/23 Office Visit Ashleigh Wheeler APR[facility]   Showing recent visits within past 540 days with a meds authorizing provider and meeting all other requirements  Future Appointments  Date Type Provider Dept   06/16/25 Appointment Ashleigh Wheeler APR[facility]   Showing future appointments within next 150 days with a meds authorizing provider and meeting all other requirements     12/13/2024

## 2025-03-12 RX ORDER — ALPRAZOLAM 0.5 MG
0.5 TABLET ORAL NIGHTLY PRN
Qty: 30 TABLET | Refills: 2 | Status: SHIPPED | OUTPATIENT
Start: 2025-03-12 | End: 2025-06-10

## 2025-03-12 NOTE — TELEPHONE ENCOUNTER
1. Anxiety  - ALPRAZolam (XANAX) 0.5 MG tablet; Take 1 tablet by mouth nightly as needed for Sleep or Anxiety for up to 90 days. Max Daily Amount: 0.5 mg  Dispense: 30 tablet; Refill: 2    2. Primary insomnia  - ALPRAZolam (XANAX) 0.5 MG tablet; Take 1 tablet by mouth nightly as needed for Sleep or Anxiety for up to 90 days. Max Daily Amount: 0.5 mg  Dispense: 30 tablet; Refill: 2    PDMP Monitoring:  Last PDMP Todd as Reviewed:  Review User Review Instant Review Result   ZABRINA POWELL 3/12/2025  6:43 PM Reviewed PDMP [1]

## 2025-04-09 DIAGNOSIS — F41.9 ANXIETY: ICD-10-CM

## 2025-04-09 DIAGNOSIS — F51.01 PRIMARY INSOMNIA: ICD-10-CM

## 2025-04-10 ENCOUNTER — HOSPITAL ENCOUNTER (OUTPATIENT)
Dept: MAMMOGRAPHY | Age: 60
Discharge: HOME | End: 2025-04-10
Payer: COMMERCIAL

## 2025-04-10 DIAGNOSIS — Z12.31 ENCOUNTER FOR SCREENING MAMMOGRAM FOR BREAST CANCER: ICD-10-CM

## 2025-04-10 PROCEDURE — 77067 SCR MAMMO BI INCL CAD: CPT

## 2025-04-10 RX ORDER — ALPRAZOLAM 0.5 MG
0.5 TABLET ORAL NIGHTLY PRN
Qty: 30 TABLET | Refills: 2 | Status: SHIPPED | OUTPATIENT
Start: 2025-04-10 | End: 2025-07-09

## 2025-04-10 NOTE — TELEPHONE ENCOUNTER
1. Anxiety  - ALPRAZolam (XANAX) 0.5 MG tablet; Take 1 tablet by mouth nightly as needed for Sleep or Anxiety for up to 90 days. Max Daily Amount: 0.5 mg  Dispense: 30 tablet; Refill: 2    2. Primary insomnia  - ALPRAZolam (XANAX) 0.5 MG tablet; Take 1 tablet by mouth nightly as needed for Sleep or Anxiety for up to 90 days. Max Daily Amount: 0.5 mg  Dispense: 30 tablet; Refill: 2    PDMP Monitoring:  Last PDMP Todd as Reviewed:  Review User Review Instant Review Result   ZABRINA POWELL 4/10/2025  6:28 PM Reviewed PDMP [1]

## 2025-04-15 ENCOUNTER — TELEPHONE (OUTPATIENT)
Dept: PRIMARY CARE CLINIC | Age: 60
End: 2025-04-15

## 2025-04-15 NOTE — TELEPHONE ENCOUNTER
SWETHA Sotelo has sent a new prescription for Valtrex, 500 mg to take once daily.  Prescription written for 1 year.

## 2025-04-18 ENCOUNTER — RESULTS FOLLOW-UP (OUTPATIENT)
Dept: PRIMARY CARE CLINIC | Age: 60
End: 2025-04-18

## 2025-04-21 DIAGNOSIS — G47.9 SLEEP DIFFICULTIES: ICD-10-CM

## 2025-04-21 DIAGNOSIS — F41.9 ANXIETY: ICD-10-CM

## 2025-04-22 RX ORDER — HYDROXYZINE HYDROCHLORIDE 25 MG/1
25 TABLET, FILM COATED ORAL EVERY 6 HOURS PRN
Qty: 90 TABLET | Refills: 1 | Status: SHIPPED | OUTPATIENT
Start: 2025-04-22

## 2025-05-27 DIAGNOSIS — F51.01 PRIMARY INSOMNIA: ICD-10-CM

## 2025-05-27 DIAGNOSIS — F41.9 ANXIETY: ICD-10-CM

## 2025-05-28 RX ORDER — ALPRAZOLAM 0.5 MG
0.5 TABLET ORAL NIGHTLY PRN
Qty: 30 TABLET | Refills: 2 | Status: SHIPPED | OUTPATIENT
Start: 2025-05-28 | End: 2025-08-26

## 2025-05-28 NOTE — TELEPHONE ENCOUNTER
1. Anxiety  - ALPRAZolam (XANAX) 0.5 MG tablet; Take 1 tablet by mouth nightly as needed for Sleep or Anxiety for up to 90 days. Max Daily Amount: 0.5 mg  Dispense: 30 tablet; Refill: 2    2. Primary insomnia  - ALPRAZolam (XANAX) 0.5 MG tablet; Take 1 tablet by mouth nightly as needed for Sleep or Anxiety for up to 90 days. Max Daily Amount: 0.5 mg  Dispense: 30 tablet; Refill: 2     PDMP Monitoring:  Last PDMP Todd as Reviewed:  Review User Review Instant Review Result   ZABRINA POWELL 5/28/2025  9:03 AM Reviewed PDMP [1]

## 2025-05-28 NOTE — TELEPHONE ENCOUNTER
Medication:   Requested Prescriptions     Pending Prescriptions Disp Refills    ALPRAZolam (XANAX) 0.5 MG tablet 30 tablet 2     Sig: Take 1 tablet by mouth nightly as needed for Sleep or Anxiety for up to 90 days. Max Daily Amount: 0.5 mg      Last Filled:  4/10/25 too soon    Patient Phone Number: [phone] (home)     Last appt: 12/13/2024   Next appt: 6/16/2025    Last OARRS:       9/7/2022     1:23 PM   RX Monitoring   Periodic Controlled Substance Monitoring Possible medication side effects, risk of tolerance/dependence & alternative treatments discussed.;No signs of potential drug abuse or diversion identified.     PDMP Monitoring:    Last PDMP Todd as Reviewed ([place]):  Review User Review Instant Review Result   ASHLEIGH WHEELER 4/10/2025  6:28 PM Reviewed PDMP [1]     Preferred Pharmacy:   [facility] [id number] [facility] [address] [facility] [phone] -  [phone]  [address] [place], [place] [address]  Phone: [phone] Fax: [phone]    Recent Visits  Date Type Provider Dept   12/13/24 Office Visit Ashleigh Wheeler APRN - [facility]   12/05/24 Office Visit Ashleigh Wheeler APRN [facility]   05/23/24 Office Visit Ashleigh Wheeler APR[facility]   Showing recent visits within past 540 days with a meds authorizing provider and meeting all other requirements  Future Appointments  Date Type Provider Dept   06/16/25 Appointment Ashleigh Wheeler APR[facility]   Showing future appointments within next 150 days with a meds authorizing provider and meeting all other requirements     12/13/2024

## 2025-06-01 DIAGNOSIS — J30.0 VASOMOTOR RHINITIS: ICD-10-CM

## 2025-06-01 DIAGNOSIS — F41.9 ANXIETY: ICD-10-CM

## 2025-06-02 RX ORDER — ESCITALOPRAM OXALATE 20 MG/1
20 TABLET ORAL DAILY
Qty: 90 TABLET | Refills: 1 | Status: SHIPPED | OUTPATIENT
Start: 2025-06-02

## 2025-06-02 RX ORDER — IPRATROPIUM BROMIDE 42 UG/1
2 SPRAY, METERED NASAL 2 TIMES DAILY
Qty: 12 EACH | Refills: 5 | Status: SHIPPED | OUTPATIENT
Start: 2025-06-02 | End: 2025-11-29

## 2025-06-02 NOTE — TELEPHONE ENCOUNTER
Medication:   Requested Prescriptions     Pending Prescriptions Disp Refills    ipratropium (ATROVENT) 0.06 % nasal spray 12 each 5     Si sprays by Nasal route 2 times daily    escitalopram (LEXAPRO) 20 MG tablet 90 tablet 1     Sig: Take 1 tablet by mouth daily      Last Filled:  escitalopram 3/5/25 too soon, 90 day sent 3/5/25 with another 90 day refill  Atrovent 24    Patient Phone Number: [phone] (home)     Last appt: 2024   Next appt: 2025    Last OARRS:       2022     1:23 PM   RX Monitoring   Periodic Controlled Substance Monitoring Possible medication side effects, risk of tolerance/dependence & alternative treatments discussed.;No signs of potential drug abuse or diversion identified.     PDMP Monitoring:    Last PDMP Todd as Reviewed ([place]):  Review User Review Instant Review Result   ASHLEIGH WHEELER 2025  9:03 AM Reviewed PDMP [1]     Preferred Pharmacy:   [facility] [id number] [facility] [address] [facility] -  [phone] -  [phone]  [address] [place] [address]  Phone: [phone] Fax: [phone]    Recent Visits  Date Type Provider Dept   24 Office Visit Ashleigh Wheeler APRN [facility]   24 Office Visit Ashleigh Wheeler APR[facility]   24 Office Visit Ashleigh Wheeler APRN [facility]   Showing recent visits within past 540 days with a meds authorizing provider and meeting all other requirements  Future Appointments  Date Type Provider Dept   25 Appointment Ashleigh Wheeler APRN [facility]   Showing future appointments within next 150 days with a meds authorizing provider and meeting all other requirements     2024

## 2025-06-15 SDOH — ECONOMIC STABILITY: FOOD INSECURITY: WITHIN THE PAST 12 MONTHS, THE FOOD YOU BOUGHT JUST DIDN'T LAST AND YOU DIDN'T HAVE MONEY TO GET MORE.: NEVER TRUE

## 2025-06-15 SDOH — ECONOMIC STABILITY: FOOD INSECURITY: WITHIN THE PAST 12 MONTHS, YOU WORRIED THAT YOUR FOOD WOULD RUN OUT BEFORE YOU GOT MONEY TO BUY MORE.: NEVER TRUE

## 2025-06-15 SDOH — ECONOMIC STABILITY: INCOME INSECURITY: IN THE LAST 12 MONTHS, WAS THERE A TIME WHEN YOU WERE NOT ABLE TO PAY THE MORTGAGE OR RENT ON TIME?: NO

## 2025-06-15 SDOH — ECONOMIC STABILITY: TRANSPORTATION INSECURITY
IN THE PAST 12 MONTHS, HAS THE LACK OF TRANSPORTATION KEPT YOU FROM MEDICAL APPOINTMENTS OR FROM GETTING MEDICATIONS?: NO

## 2025-06-15 ASSESSMENT — PATIENT HEALTH QUESTIONNAIRE - PHQ9
SUM OF ALL RESPONSES TO PHQ9 QUESTIONS 1 & 2: 0
1. LITTLE INTEREST OR PLEASURE IN DOING THINGS: NOT AT ALL
2. FEELING DOWN, DEPRESSED OR HOPELESS: NOT AT ALL
2. FEELING DOWN, DEPRESSED OR HOPELESS: NOT AT ALL
SUM OF ALL RESPONSES TO PHQ QUESTIONS 1-9: 0
1. LITTLE INTEREST OR PLEASURE IN DOING THINGS: NOT AT ALL

## 2025-06-16 ENCOUNTER — OFFICE VISIT (OUTPATIENT)
Dept: PRIMARY CARE CLINIC | Age: 60
End: 2025-06-16
Payer: COMMERCIAL

## 2025-06-16 VITALS
TEMPERATURE: 98 F | DIASTOLIC BLOOD PRESSURE: 70 MMHG | HEART RATE: 70 BPM | SYSTOLIC BLOOD PRESSURE: 118 MMHG | WEIGHT: 143 LBS | OXYGEN SATURATION: 99 % | BODY MASS INDEX: 21.18 KG/M2 | RESPIRATION RATE: 16 BRPM | HEIGHT: 69 IN

## 2025-06-16 DIAGNOSIS — N95.1 VASOMOTOR SYMPTOMS DUE TO MENOPAUSE: ICD-10-CM

## 2025-06-16 DIAGNOSIS — G43.009 MIGRAINE WITHOUT AURA AND WITHOUT STATUS MIGRAINOSUS, NOT INTRACTABLE: ICD-10-CM

## 2025-06-16 DIAGNOSIS — F51.01 PRIMARY INSOMNIA: ICD-10-CM

## 2025-06-16 DIAGNOSIS — M25.541 ARTHRALGIA OF BOTH HANDS: ICD-10-CM

## 2025-06-16 DIAGNOSIS — M25.542 ARTHRALGIA OF BOTH HANDS: ICD-10-CM

## 2025-06-16 DIAGNOSIS — F41.9 ANXIETY: Primary | ICD-10-CM

## 2025-06-16 DIAGNOSIS — E78.2 MODERATE MIXED HYPERLIPIDEMIA NOT REQUIRING STATIN THERAPY: ICD-10-CM

## 2025-06-16 PROBLEM — J30.0 VASOMOTOR RHINITIS: Status: RESOLVED | Noted: 2023-05-11 | Resolved: 2025-06-16

## 2025-06-16 PROCEDURE — 99214 OFFICE O/P EST MOD 30 MIN: CPT | Performed by: NURSE PRACTITIONER

## 2025-06-16 RX ORDER — NAPROXEN 500 MG/1
500 TABLET ORAL 2 TIMES DAILY WITH MEALS
Qty: 60 TABLET | Refills: 1 | Status: CANCELLED | OUTPATIENT
Start: 2025-06-16 | End: 2025-08-15

## 2025-06-16 RX ORDER — BUTALBITAL, ACETAMINOPHEN AND CAFFEINE 50; 325; 40 MG/1; MG/1; MG/1
1 TABLET ORAL EVERY 4 HOURS PRN
COMMUNITY

## 2025-06-16 SDOH — ECONOMIC STABILITY: FOOD INSECURITY: WITHIN THE PAST 12 MONTHS, THE FOOD YOU BOUGHT JUST DIDN'T LAST AND YOU DIDN'T HAVE MONEY TO GET MORE.: NEVER TRUE

## 2025-06-16 SDOH — ECONOMIC STABILITY: FOOD INSECURITY: WITHIN THE PAST 12 MONTHS, YOU WORRIED THAT YOUR FOOD WOULD RUN OUT BEFORE YOU GOT MONEY TO BUY MORE.: NEVER TRUE

## 2025-06-16 ASSESSMENT — ENCOUNTER SYMPTOMS
SHORTNESS OF BREATH: 0
COUGH: 0
WHEEZING: 0
CHEST TIGHTNESS: 0
GASTROINTESTINAL NEGATIVE: 1

## 2025-06-16 NOTE — PROGRESS NOTES
6/16/2025    Chief Complaint   Patient presents with    6 Month Follow-Up     Anxiety, depression, migraine heaches & insomnia       Idania Batres is a 60 y.o. female, presents today for 6 month f/u anxiety, depression, migraine heaches & insomnia.      HPI   Anxiety and Depression  Current medication: Lexapro 20 mg daily and hydroxyzine 25 mg as needed for anxiety.  Patient reports anxiety and depression is well controlled with current medications dosages.  She takes hydroxyzine nightly.   Denies side effects medications.  Denies homicidal and suicidal ideation.      Migraine Headaches  Patient reports history of migraine headaches. Reports occasional stress headaches typically \"a couple times a month\". Frequency and intensity has decreased since she went through menopause. She has no aura at onset of headache. She reports nausea (without vomiting) during headaches.     She takes Fioricet at onset of headache that is effective in aborting headaches; takes a couple times a month.       Insomnia  Patient reports history of insomnia with difficulty falling asleep.   Alprazolam 0.5 mg nightly and hydroxyzine 25 mg nightly.     She is taking as prescribed and tolerating well.    Denies side effects from medication.  Patient is sleeping  7  hours a night.   She reports feeling well rested upon waking.   Denies daytime sleepiness.    PDMP Monitoring:  Last PDMP Todd as Reviewed:  Review User Review Instant Review Result   ZABRINA POWELL 6/16/2025  7:11 AM Reviewed PDMP [1]       Bilateral hand pain  Patient reports bilateral hand pain secondary to arthritis is fairly well-controlled.  Patient infrequently applies Voltaren 1% gel and will take Naproxyn 500 mg BID PRN hand pain.       Vasomotor symptoms secondary to menopause  Patient reports symptoms have improved since she started hormone pellet treatment.    Review of Systems   Constitutional:  Negative for activity change, appetite change, fatigue and unexpected

## 2025-06-30 DIAGNOSIS — F41.9 ANXIETY: ICD-10-CM

## 2025-06-30 DIAGNOSIS — F51.01 PRIMARY INSOMNIA: ICD-10-CM

## 2025-06-30 DIAGNOSIS — J30.0 VASOMOTOR RHINITIS: ICD-10-CM

## 2025-06-30 DIAGNOSIS — B00.1 RECURRENT COLD SORES: ICD-10-CM

## 2025-07-02 RX ORDER — IPRATROPIUM BROMIDE 42 UG/1
2 SPRAY, METERED NASAL 2 TIMES DAILY
Qty: 12 EACH | Refills: 5 | Status: SHIPPED | OUTPATIENT
Start: 2025-07-02 | End: 2025-12-29

## 2025-07-02 RX ORDER — ALPRAZOLAM 0.5 MG
0.5 TABLET ORAL NIGHTLY PRN
Qty: 30 TABLET | Refills: 2 | Status: SHIPPED | OUTPATIENT
Start: 2025-07-02 | End: 2025-09-30

## 2025-07-02 RX ORDER — VALACYCLOVIR HYDROCHLORIDE 500 MG/1
500 TABLET, FILM COATED ORAL DAILY
Qty: 90 TABLET | Refills: 3 | Status: SHIPPED | OUTPATIENT
Start: 2025-07-02 | End: 2026-07-02

## 2025-07-02 NOTE — TELEPHONE ENCOUNTER
1. Recurrent cold sores  - valACYclovir (VALTREX) 500 MG tablet; Take 1 tablet by mouth daily  Dispense: 90 tablet; Refill: 3    2. Anxiety  - ALPRAZolam (XANAX) 0.5 MG tablet; Take 1 tablet by mouth nightly as needed for Sleep or Anxiety for up to 90 days. Max Daily Amount: 0.5 mg  Dispense: 30 tablet; Refill: 2    3. Primary insomnia  - ALPRAZolam (XANAX) 0.5 MG tablet; Take 1 tablet by mouth nightly as needed for Sleep or Anxiety for up to 90 days. Max Daily Amount: 0.5 mg  Dispense: 30 tablet; Refill: 2    4. Vasomotor rhinitis  - ipratropium (ATROVENT) 0.06 % nasal spray; 2 sprays by Nasal route 2 times daily  Dispense: 12 each; Refill: 5

## 2025-07-07 ENCOUNTER — TELEPHONE (OUTPATIENT)
Dept: ADMINISTRATIVE | Age: 60
End: 2025-07-07

## 2025-08-03 DIAGNOSIS — F41.9 ANXIETY: ICD-10-CM

## 2025-08-03 DIAGNOSIS — F51.01 PRIMARY INSOMNIA: ICD-10-CM

## 2025-08-04 RX ORDER — ALPRAZOLAM 0.5 MG
0.5 TABLET ORAL NIGHTLY PRN
Qty: 30 TABLET | Refills: 2 | Status: SHIPPED | OUTPATIENT
Start: 2025-08-04 | End: 2025-11-02

## 2025-09-03 DIAGNOSIS — F51.01 PRIMARY INSOMNIA: ICD-10-CM

## 2025-09-03 DIAGNOSIS — F41.9 ANXIETY: ICD-10-CM

## 2025-09-04 RX ORDER — ALPRAZOLAM 0.5 MG
0.5 TABLET ORAL NIGHTLY PRN
Qty: 30 TABLET | Refills: 2 | Status: SHIPPED | OUTPATIENT
Start: 2025-09-04 | End: 2025-12-03

## (undated) DEVICE — MASTISOL ADHESIVE LIQ 2/3ML

## (undated) DEVICE — MEDICINE CUP, GRADUATED, STER: Brand: MEDLINE

## (undated) DEVICE — INTENDED FOR TISSUE SEPARATION, AND OTHER PROCEDURES THAT REQUIRE A SHARP SURGICAL BLADE TO PUNCTURE OR CUT.: Brand: BARD-PARKER ® STAINLESS STEEL BLADES

## (undated) DEVICE — BNDG,ELSTC,MATRIX,STRL,4"X5YD,LF,HOOK&LP: Brand: MEDLINE

## (undated) DEVICE — NEEDLE HYPO 22GA L1.5IN BLK POLYPR HUB S STL REG BVL STR

## (undated) DEVICE — GLOVE ORTHO 8   MSG9480

## (undated) DEVICE — BANDAGE COMPR W4INXL12FT E DISP ESMARCH EVEN

## (undated) DEVICE — 3M™ STERI-DRAPE™ U-DRAPE 1015: Brand: STERI-DRAPE™

## (undated) DEVICE — DRESSING,GAUZE,XEROFORM,CURAD,1"X8",ST: Brand: CURAD

## (undated) DEVICE — SYRINGE 60ML BULB IRRIG ST LF

## (undated) DEVICE — TRAP SPEC RETRV CLR PLAS POLYP IN LN SUCT QUIK CTCH

## (undated) DEVICE — GLOVE 8 LTX ST TRIFLEX POWDER LK

## (undated) DEVICE — SOLUTION IRRIG 500ML 0.9% SOD CHL USP POUR PLAS BTL

## (undated) DEVICE — PADDING CAST W4INXL4YD ST COT RAYON MICROPLEATED HIGHLY

## (undated) DEVICE — SNARE ENDOSCP L240CM OD24MM LOOP W10MM RND INSUL STIFF BRAID

## (undated) DEVICE — TOWEL,OR,DSP,ST,BLUE,STD,4/PK,20PK/CS: Brand: MEDLINE

## (undated) DEVICE — 3M™ STERI-STRIP™ REINFORCED ADHESIVE SKIN CLOSURES, R1547, 1/2 IN X 4 IN (12 MM X 100 MM), 6 STRIPS/ENVELOPE: Brand: 3M™ STERI-STRIP™

## (undated) DEVICE — SUTURE MCRYL + SZ 4-0 L27IN ABSRB UD L19MM PS-2 3/8 CIR MCP426H

## (undated) DEVICE — SUTURE VCRL + SZ 3-0 L18IN ABSRB UD SH 1/2 CIR TAPERCUT NDL VCP864D

## (undated) DEVICE — ZIMMER® STERILE DISPOSABLE TOURNIQUET CUFF WITH PLC, DUAL PORT, SINGLE BLADDER, 18 IN. (46 CM)

## (undated) DEVICE — PRECISION THIN (9.0 X 0.38 X 31.0MM)

## (undated) DEVICE — APPLICATOR MEDICATED 26 CC SOLUTION HI LT ORNG CHLORAPREP

## (undated) DEVICE — BANDAGE,GAUZE,CONFORMING,3"X75",STRL,LF: Brand: MEDLINE

## (undated) DEVICE — GAUZE,SPONGE,4"X4",8PLY,STRL,LF,10/TRAY: Brand: MEDLINE

## (undated) DEVICE — C-ARM: Brand: UNBRANDED